# Patient Record
Sex: FEMALE | Race: WHITE | Employment: FULL TIME | URBAN - NONMETROPOLITAN AREA
[De-identification: names, ages, dates, MRNs, and addresses within clinical notes are randomized per-mention and may not be internally consistent; named-entity substitution may affect disease eponyms.]

---

## 2017-02-06 ENCOUNTER — NURSE ONLY (OUTPATIENT)
Dept: SURGERY | Age: 37
End: 2017-02-06

## 2017-02-06 ENCOUNTER — OFFICE VISIT (OUTPATIENT)
Dept: NEPHROLOGY | Age: 37
End: 2017-02-06

## 2017-02-06 VITALS
HEART RATE: 84 BPM | BODY MASS INDEX: 26.22 KG/M2 | DIASTOLIC BLOOD PRESSURE: 88 MMHG | WEIGHT: 148 LBS | HEIGHT: 63 IN | SYSTOLIC BLOOD PRESSURE: 140 MMHG

## 2017-02-06 DIAGNOSIS — Z84.1 FAMILY HISTORY OF KIDNEY STONES: ICD-10-CM

## 2017-02-06 DIAGNOSIS — J32.9 CHRONIC SINUSITIS, UNSPECIFIED LOCATION: ICD-10-CM

## 2017-02-06 DIAGNOSIS — K21.9 GASTROESOPHAGEAL REFLUX DISEASE, ESOPHAGITIS PRESENCE NOT SPECIFIED: Primary | ICD-10-CM

## 2017-02-06 DIAGNOSIS — N20.0 CALCIUM NEPHROLITHIASIS: Primary | ICD-10-CM

## 2017-02-06 DIAGNOSIS — R12 HEARTBURN: ICD-10-CM

## 2017-02-06 PROCEDURE — 99203 OFFICE O/P NEW LOW 30 MIN: CPT | Performed by: INTERNAL MEDICINE

## 2017-02-06 RX ORDER — NORETHINDRONE ACETATE AND ETHINYL ESTRADIOL 1MG-20(21)
1 KIT ORAL DAILY
COMMUNITY
End: 2017-04-21 | Stop reason: ALTCHOICE

## 2017-02-10 DIAGNOSIS — F41.9 ANXIETY: ICD-10-CM

## 2017-02-10 RX ORDER — LORAZEPAM 0.5 MG/1
0.5 TABLET ORAL EVERY 8 HOURS PRN
Qty: 20 TABLET | Refills: 0 | Status: SHIPPED | OUTPATIENT
Start: 2017-02-10 | End: 2017-10-20 | Stop reason: SDUPTHER

## 2017-02-17 ENCOUNTER — HOSPITAL ENCOUNTER (OUTPATIENT)
Age: 37
Setting detail: SPECIMEN
Discharge: HOME | End: 2017-02-17
Payer: COMMERCIAL

## 2017-02-17 LAB
CALCIUM IONIZED: 1.24 MMOL/L (ref 1.13–1.33)
PTH INTACT: 36.91 PG/ML (ref 15–65)
VITAMIN D 25-HYDROXY: 50.7 NG/ML (ref 30–100)

## 2017-02-18 ENCOUNTER — HOSPITAL ENCOUNTER (OUTPATIENT)
Age: 37
Setting detail: SPECIMEN
Discharge: HOME | End: 2017-02-18
Payer: COMMERCIAL

## 2017-02-20 LAB
CALCIUM TIMED UR: NORMAL MG/X H
CALCIUM URINE: 26.4 MG/DL
CALCIUM, URINE: 216 MG/24 H (ref 20–275)
HOURS COLLECTED: 24 H
PHOS,INORG TIMED UR: NORMAL MG/DL
PHOSPHORUS  URINE: 92.4 MG/DL
PHOSPHORUS, 24H UR: 758 MG/24 H (ref 400–1300)
URIC ACID 24 HOUR URINE: 443 MG/24 H (ref 250–750)
URIC ACID TIMED UR: NORMAL MG/X H
URIC ACID, UR: 54 MG/DL
VOLUME: 820 ML

## 2017-02-24 LAB
CITRIC ACID, U, 24HR: 588 MG/D (ref 320–1240)
CITRIC ACID, URINE: 717 MG/L
CITRIC ACID/CREATININE RATIO URINE: 569 MG/G
CREATININE URINE /24 HR: 1033 MG/D (ref 700–1600)
CREATININE URINE /24 HR: 1099 MG/D (ref 700–1600)
CREATININE URINE /VOLUME: 126 MG/DL
CREATININE URINE /VOLUME: 134 MG/DL
HOURS COLLECTED: 24
HOURS COLLECTED: 24
OXALATE 24 HOUR URINE: 19 MG/D (ref 4–31)
OXALATE URINE: 23 MG/L
URINE VOLUME: 820
URINE VOLUME: 820

## 2017-03-06 ENCOUNTER — OFFICE VISIT (OUTPATIENT)
Dept: NEPHROLOGY | Age: 37
End: 2017-03-06

## 2017-03-06 VITALS
HEART RATE: 76 BPM | RESPIRATION RATE: 16 BRPM | HEIGHT: 63 IN | SYSTOLIC BLOOD PRESSURE: 136 MMHG | BODY MASS INDEX: 26.22 KG/M2 | WEIGHT: 148 LBS | DIASTOLIC BLOOD PRESSURE: 92 MMHG

## 2017-03-06 DIAGNOSIS — Z84.1 FAMILY HISTORY OF KIDNEY STONES: ICD-10-CM

## 2017-03-06 DIAGNOSIS — N20.0 NEPHROLITHIASIS: Primary | ICD-10-CM

## 2017-03-06 DIAGNOSIS — N20.0 CALCIUM NEPHROLITHIASIS: ICD-10-CM

## 2017-03-06 PROCEDURE — 99213 OFFICE O/P EST LOW 20 MIN: CPT | Performed by: INTERNAL MEDICINE

## 2017-03-13 ENCOUNTER — HOSPITAL ENCOUNTER (OUTPATIENT)
Age: 37
Setting detail: OUTPATIENT SURGERY
Discharge: HOME | End: 2017-03-13
Attending: SURGERY | Admitting: SURGERY
Payer: COMMERCIAL

## 2017-03-13 ENCOUNTER — ANESTHESIA EVENT (OUTPATIENT)
Dept: OPERATING ROOM | Age: 37
End: 2017-03-13
Payer: COMMERCIAL

## 2017-03-13 ENCOUNTER — ANESTHESIA (OUTPATIENT)
Dept: OPERATING ROOM | Age: 37
End: 2017-03-13
Payer: COMMERCIAL

## 2017-03-13 ENCOUNTER — SURGERY (OUTPATIENT)
Age: 37
End: 2017-03-13

## 2017-03-13 VITALS
RESPIRATION RATE: 11 BRPM | DIASTOLIC BLOOD PRESSURE: 81 MMHG | SYSTOLIC BLOOD PRESSURE: 121 MMHG | OXYGEN SATURATION: 99 %

## 2017-03-13 VITALS
SYSTOLIC BLOOD PRESSURE: 135 MMHG | WEIGHT: 146 LBS | BODY MASS INDEX: 25.87 KG/M2 | HEART RATE: 68 BPM | RESPIRATION RATE: 16 BRPM | TEMPERATURE: 98.4 F | DIASTOLIC BLOOD PRESSURE: 91 MMHG | HEIGHT: 63 IN | OXYGEN SATURATION: 100 %

## 2017-03-13 LAB — HCG(URINE) PREGNANCY TEST: NEGATIVE

## 2017-03-13 PROCEDURE — 43235 EGD DIAGNOSTIC BRUSH WASH: CPT | Performed by: SURGERY

## 2017-03-13 PROCEDURE — 88342 IMHCHEM/IMCYTCHM 1ST ANTB: CPT

## 2017-03-13 PROCEDURE — 3609012400 HC EGD TRANSORAL BIOPSY SINGLE/MULTIPLE: Performed by: SURGERY

## 2017-03-13 PROCEDURE — 6360000002 HC RX W HCPCS: Performed by: NURSE ANESTHETIST, CERTIFIED REGISTERED

## 2017-03-13 PROCEDURE — 7100000010 HC PHASE II RECOVERY - FIRST 15 MIN: Performed by: SURGERY

## 2017-03-13 PROCEDURE — 99152 MOD SED SAME PHYS/QHP 5/>YRS: CPT

## 2017-03-13 PROCEDURE — 7100000011 HC PHASE II RECOVERY - ADDTL 15 MIN: Performed by: SURGERY

## 2017-03-13 PROCEDURE — 84703 CHORIONIC GONADOTROPIN ASSAY: CPT

## 2017-03-13 PROCEDURE — 88305 TISSUE EXAM BY PATHOLOGIST: CPT

## 2017-03-13 PROCEDURE — 2580000003 HC RX 258: Performed by: SURGERY

## 2017-03-13 RX ORDER — SODIUM CHLORIDE, SODIUM LACTATE, POTASSIUM CHLORIDE, CALCIUM CHLORIDE 600; 310; 30; 20 MG/100ML; MG/100ML; MG/100ML; MG/100ML
INJECTION, SOLUTION INTRAVENOUS CONTINUOUS
Status: DISCONTINUED | OUTPATIENT
Start: 2017-03-13 | End: 2017-03-13 | Stop reason: HOSPADM

## 2017-03-13 RX ORDER — MIDAZOLAM HYDROCHLORIDE 1 MG/ML
INJECTION INTRAMUSCULAR; INTRAVENOUS PRN
Status: DISCONTINUED | OUTPATIENT
Start: 2017-03-13 | End: 2017-03-13 | Stop reason: SDUPTHER

## 2017-03-13 RX ORDER — FENTANYL CITRATE 50 UG/ML
INJECTION, SOLUTION INTRAMUSCULAR; INTRAVENOUS PRN
Status: DISCONTINUED | OUTPATIENT
Start: 2017-03-13 | End: 2017-03-13 | Stop reason: SDUPTHER

## 2017-03-13 RX ORDER — PROPOFOL 10 MG/ML
INJECTION, EMULSION INTRAVENOUS PRN
Status: DISCONTINUED | OUTPATIENT
Start: 2017-03-13 | End: 2017-03-13 | Stop reason: SDUPTHER

## 2017-03-13 RX ADMIN — PROPOFOL 60 MG: 10 INJECTION, EMULSION INTRAVENOUS at 09:48

## 2017-03-13 RX ADMIN — SODIUM CHLORIDE, POTASSIUM CHLORIDE, SODIUM LACTATE AND CALCIUM CHLORIDE: 600; 310; 30; 20 INJECTION, SOLUTION INTRAVENOUS at 09:18

## 2017-03-13 RX ADMIN — PROPOFOL 40 MG: 10 INJECTION, EMULSION INTRAVENOUS at 09:47

## 2017-03-13 RX ADMIN — PROPOFOL 30 MG: 10 INJECTION, EMULSION INTRAVENOUS at 09:51

## 2017-03-13 RX ADMIN — FENTANYL CITRATE 100 MCG: 50 INJECTION INTRAMUSCULAR; INTRAVENOUS at 09:47

## 2017-03-13 RX ADMIN — MIDAZOLAM 2 MG: 1 INJECTION INTRAMUSCULAR; INTRAVENOUS at 09:47

## 2017-03-13 ASSESSMENT — PAIN SCALES - GENERAL
PAINLEVEL_OUTOF10: 0

## 2017-03-13 ASSESSMENT — PAIN - FUNCTIONAL ASSESSMENT: PAIN_FUNCTIONAL_ASSESSMENT: 0-10

## 2017-03-15 LAB — SURGICAL PATHOLOGY REPORT: NORMAL

## 2017-03-27 ENCOUNTER — TELEPHONE (OUTPATIENT)
Dept: SURGERY | Age: 37
End: 2017-03-27

## 2017-04-07 ENCOUNTER — TELEPHONE (OUTPATIENT)
Dept: PRIMARY CARE CLINIC | Age: 37
End: 2017-04-07

## 2017-04-07 ENCOUNTER — TELEPHONE (OUTPATIENT)
Dept: SURGERY | Age: 37
End: 2017-04-07

## 2017-04-07 ENCOUNTER — OFFICE VISIT (OUTPATIENT)
Dept: PRIMARY CARE CLINIC | Age: 37
End: 2017-04-07
Payer: COMMERCIAL

## 2017-04-07 VITALS
RESPIRATION RATE: 16 BRPM | SYSTOLIC BLOOD PRESSURE: 142 MMHG | WEIGHT: 147 LBS | OXYGEN SATURATION: 98 % | DIASTOLIC BLOOD PRESSURE: 92 MMHG | TEMPERATURE: 100 F | HEART RATE: 96 BPM | BODY MASS INDEX: 26.05 KG/M2 | HEIGHT: 63 IN

## 2017-04-07 DIAGNOSIS — J06.9 ACUTE URI: ICD-10-CM

## 2017-04-07 DIAGNOSIS — J01.00 ACUTE NON-RECURRENT MAXILLARY SINUSITIS: Primary | ICD-10-CM

## 2017-04-07 PROCEDURE — 99213 OFFICE O/P EST LOW 20 MIN: CPT | Performed by: FAMILY MEDICINE

## 2017-04-07 RX ORDER — AZITHROMYCIN 250 MG/1
TABLET, FILM COATED ORAL
Qty: 11 TABLET | Refills: 0 | Status: SHIPPED | OUTPATIENT
Start: 2017-04-07 | End: 2017-04-21 | Stop reason: ALTCHOICE

## 2017-04-07 RX ORDER — DOXYCYCLINE HYCLATE 100 MG/1
100 CAPSULE ORAL 2 TIMES DAILY
Qty: 20 CAPSULE | Refills: 0 | Status: SHIPPED | OUTPATIENT
Start: 2017-04-07 | End: 2017-04-07

## 2017-04-07 ASSESSMENT — ENCOUNTER SYMPTOMS
GASTROINTESTINAL NEGATIVE: 1
EYES NEGATIVE: 1
COUGH: 1
SINUS PRESSURE: 1
RHINORRHEA: 1
ALLERGIC/IMMUNOLOGIC NEGATIVE: 1
SORE THROAT: 1

## 2017-04-12 ENCOUNTER — TELEPHONE (OUTPATIENT)
Dept: FAMILY MEDICINE CLINIC | Age: 37
End: 2017-04-12

## 2017-04-12 DIAGNOSIS — K21.9 LPRD (LARYNGOPHARYNGEAL REFLUX DISEASE): ICD-10-CM

## 2017-04-12 DIAGNOSIS — J30.1 SEASONAL ALLERGIC RHINITIS DUE TO POLLEN: ICD-10-CM

## 2017-04-12 DIAGNOSIS — K21.9 GASTROESOPHAGEAL REFLUX DISEASE, ESOPHAGITIS PRESENCE NOT SPECIFIED: ICD-10-CM

## 2017-04-12 RX ORDER — RANITIDINE 300 MG/1
TABLET ORAL
Qty: 90 TABLET | Refills: 3 | Status: SHIPPED | OUTPATIENT
Start: 2017-04-12 | End: 2018-03-27 | Stop reason: SDUPTHER

## 2017-04-12 RX ORDER — FLUTICASONE PROPIONATE 50 MCG
SPRAY, SUSPENSION (ML) NASAL
Qty: 1 BOTTLE | Refills: 11 | Status: SHIPPED | OUTPATIENT
Start: 2017-04-12 | End: 2018-03-27 | Stop reason: SDUPTHER

## 2017-04-21 ENCOUNTER — OFFICE VISIT (OUTPATIENT)
Dept: FAMILY MEDICINE CLINIC | Age: 37
End: 2017-04-21
Payer: COMMERCIAL

## 2017-04-21 VITALS
HEIGHT: 63 IN | OXYGEN SATURATION: 96 % | HEART RATE: 87 BPM | SYSTOLIC BLOOD PRESSURE: 118 MMHG | DIASTOLIC BLOOD PRESSURE: 64 MMHG | WEIGHT: 145 LBS | BODY MASS INDEX: 25.69 KG/M2

## 2017-04-21 DIAGNOSIS — R79.89 LOW T4: Primary | ICD-10-CM

## 2017-04-21 DIAGNOSIS — R53.82 CHRONIC FATIGUE: ICD-10-CM

## 2017-04-21 DIAGNOSIS — E16.2 HYPOGLYCEMIA: ICD-10-CM

## 2017-04-21 DIAGNOSIS — J01.41 ACUTE RECURRENT PANSINUSITIS: ICD-10-CM

## 2017-04-21 DIAGNOSIS — L65.9 HAIR LOSS: ICD-10-CM

## 2017-04-21 PROCEDURE — 99214 OFFICE O/P EST MOD 30 MIN: CPT | Performed by: FAMILY MEDICINE

## 2017-04-21 RX ORDER — ZINC OXIDE 13 %
1 CREAM (GRAM) TOPICAL DAILY
Qty: 30 CAPSULE | Refills: 3 | Status: SHIPPED | OUTPATIENT
Start: 2017-04-21 | End: 2017-05-26

## 2017-04-21 RX ORDER — CEFUROXIME AXETIL 250 MG/1
250 TABLET ORAL 2 TIMES DAILY
Qty: 20 TABLET | Refills: 0 | Status: SHIPPED | OUTPATIENT
Start: 2017-04-21 | End: 2017-05-01

## 2017-04-24 ENCOUNTER — TELEPHONE (OUTPATIENT)
Dept: FAMILY MEDICINE CLINIC | Age: 37
End: 2017-04-24

## 2017-04-24 DIAGNOSIS — J01.41 ACUTE RECURRENT PANSINUSITIS: Primary | ICD-10-CM

## 2017-04-24 RX ORDER — LEVOFLOXACIN 500 MG/1
500 TABLET, FILM COATED ORAL DAILY
Qty: 7 TABLET | Refills: 0 | Status: SHIPPED | OUTPATIENT
Start: 2017-04-24 | End: 2017-05-01

## 2017-05-04 ASSESSMENT — ENCOUNTER SYMPTOMS
COUGH: 1
VOICE CHANGE: 1
SINUS PRESSURE: 1
CONSTIPATION: 1

## 2017-05-05 ENCOUNTER — HOSPITAL ENCOUNTER (OUTPATIENT)
Age: 37
Setting detail: SPECIMEN
Discharge: HOME | End: 2017-05-05
Payer: COMMERCIAL

## 2017-05-05 ENCOUNTER — TELEPHONE (OUTPATIENT)
Dept: FAMILY MEDICINE CLINIC | Age: 37
End: 2017-05-05

## 2017-05-05 LAB
INSULIN COMMENT: NORMAL
INSULIN REFERENCE RANGE:: NORMAL
INSULIN: 25 MU/L
T3 TOTAL: 170 NG/DL (ref 80–200)
VITAMIN D 25-HYDROXY: 53.9 NG/ML (ref 30–100)

## 2017-05-06 ENCOUNTER — OFFICE VISIT (OUTPATIENT)
Dept: PRIMARY CARE CLINIC | Age: 37
End: 2017-05-06
Payer: COMMERCIAL

## 2017-05-06 VITALS
HEIGHT: 63 IN | WEIGHT: 145.8 LBS | TEMPERATURE: 99.3 F | BODY MASS INDEX: 25.83 KG/M2 | SYSTOLIC BLOOD PRESSURE: 116 MMHG | RESPIRATION RATE: 16 BRPM | HEART RATE: 93 BPM | OXYGEN SATURATION: 98 % | DIASTOLIC BLOOD PRESSURE: 86 MMHG

## 2017-05-06 DIAGNOSIS — H69.83 EUSTACHIAN TUBE DYSFUNCTION, BILATERAL: ICD-10-CM

## 2017-05-06 DIAGNOSIS — J32.9 CHRONIC RECURRENT SINUSITIS: Primary | ICD-10-CM

## 2017-05-06 PROCEDURE — 99213 OFFICE O/P EST LOW 20 MIN: CPT | Performed by: FAMILY MEDICINE

## 2017-05-06 RX ORDER — PREDNISONE 20 MG/1
TABLET ORAL
Qty: 13 TABLET | Refills: 0 | Status: SHIPPED | OUTPATIENT
Start: 2017-05-06 | End: 2017-05-18 | Stop reason: ALTCHOICE

## 2017-05-06 RX ORDER — FEXOFENADINE HCL 180 MG/1
180 TABLET ORAL DAILY
Qty: 30 TABLET | Refills: 11 | Status: SHIPPED | OUTPATIENT
Start: 2017-05-06 | End: 2018-02-09 | Stop reason: ALTCHOICE

## 2017-05-06 ASSESSMENT — ENCOUNTER SYMPTOMS
NAUSEA: 0
VOMITING: 0
RHINORRHEA: 0
SHORTNESS OF BREATH: 0
SINUS PRESSURE: 1
WHEEZING: 0
COUGH: 1
VOICE CHANGE: 1
SINUS COMPLAINT: 1

## 2017-05-10 ENCOUNTER — OFFICE VISIT (OUTPATIENT)
Dept: OTOLARYNGOLOGY | Age: 37
End: 2017-05-10
Payer: COMMERCIAL

## 2017-05-10 VITALS
WEIGHT: 143 LBS | SYSTOLIC BLOOD PRESSURE: 124 MMHG | HEIGHT: 63 IN | TEMPERATURE: 98.2 F | DIASTOLIC BLOOD PRESSURE: 72 MMHG | HEART RATE: 72 BPM | BODY MASS INDEX: 25.34 KG/M2

## 2017-05-10 DIAGNOSIS — J32.9 SINUSITIS, UNSPECIFIED CHRONICITY, UNSPECIFIED LOCATION: Primary | ICD-10-CM

## 2017-05-10 DIAGNOSIS — H69.82 ETD (EUSTACHIAN TUBE DYSFUNCTION), LEFT: ICD-10-CM

## 2017-05-10 PROCEDURE — 31231 NASAL ENDOSCOPY DX: CPT | Performed by: OTOLARYNGOLOGY

## 2017-05-17 ENCOUNTER — TELEPHONE (OUTPATIENT)
Dept: FAMILY MEDICINE CLINIC | Age: 37
End: 2017-05-17

## 2017-05-17 DIAGNOSIS — R73.01 ELEVATED FASTING BLOOD SUGAR: Primary | ICD-10-CM

## 2017-05-18 ENCOUNTER — OFFICE VISIT (OUTPATIENT)
Dept: PRIMARY CARE CLINIC | Age: 37
End: 2017-05-18
Payer: COMMERCIAL

## 2017-05-18 VITALS
TEMPERATURE: 99.9 F | OXYGEN SATURATION: 98 % | HEIGHT: 63 IN | DIASTOLIC BLOOD PRESSURE: 74 MMHG | BODY MASS INDEX: 25.98 KG/M2 | WEIGHT: 146.6 LBS | HEART RATE: 74 BPM | SYSTOLIC BLOOD PRESSURE: 126 MMHG

## 2017-05-18 DIAGNOSIS — J01.41 ACUTE RECURRENT PANSINUSITIS: ICD-10-CM

## 2017-05-18 DIAGNOSIS — J06.9 ACUTE URI: Primary | ICD-10-CM

## 2017-05-18 PROCEDURE — 99213 OFFICE O/P EST LOW 20 MIN: CPT | Performed by: FAMILY MEDICINE

## 2017-05-18 RX ORDER — LEVOFLOXACIN 500 MG/1
500 TABLET, FILM COATED ORAL DAILY
Qty: 10 TABLET | Refills: 0 | Status: SHIPPED | OUTPATIENT
Start: 2017-05-18 | End: 2017-05-28

## 2017-05-18 RX ORDER — DOXYCYCLINE HYCLATE 100 MG/1
100 CAPSULE ORAL 2 TIMES DAILY
Qty: 20 CAPSULE | Refills: 0 | Status: SHIPPED | OUTPATIENT
Start: 2017-05-18 | End: 2017-05-18

## 2017-05-18 ASSESSMENT — ENCOUNTER SYMPTOMS
RHINORRHEA: 1
COUGH: 1
EYES NEGATIVE: 1
SORE THROAT: 1
GASTROINTESTINAL NEGATIVE: 1
SHORTNESS OF BREATH: 1
SINUS PRESSURE: 1
ALLERGIC/IMMUNOLOGIC NEGATIVE: 1

## 2017-05-19 ENCOUNTER — TELEPHONE (OUTPATIENT)
Dept: ALLERGY | Age: 37
End: 2017-05-19

## 2017-05-19 ENCOUNTER — TELEPHONE (OUTPATIENT)
Dept: FAMILY MEDICINE CLINIC | Age: 37
End: 2017-05-19

## 2017-05-19 RX ORDER — BENZONATATE 100 MG/1
100 CAPSULE ORAL 3 TIMES DAILY PRN
Qty: 30 CAPSULE | Refills: 0 | Status: SHIPPED | OUTPATIENT
Start: 2017-05-19 | End: 2017-06-30 | Stop reason: ALTCHOICE

## 2017-05-26 ENCOUNTER — TELEPHONE (OUTPATIENT)
Dept: OTOLARYNGOLOGY | Age: 37
End: 2017-05-26

## 2017-05-26 ENCOUNTER — OFFICE VISIT (OUTPATIENT)
Dept: INTERNAL MEDICINE | Age: 37
End: 2017-05-26

## 2017-05-26 VITALS — BODY MASS INDEX: 25.87 KG/M2 | HEIGHT: 63 IN | WEIGHT: 146 LBS

## 2017-05-26 DIAGNOSIS — R79.89 ABNORMAL THYROID BLOOD TEST: Primary | ICD-10-CM

## 2017-05-26 DIAGNOSIS — K21.9 GASTROESOPHAGEAL REFLUX DISEASE, ESOPHAGITIS PRESENCE NOT SPECIFIED: ICD-10-CM

## 2017-05-26 DIAGNOSIS — E78.5 HYPERLIPIDEMIA, UNSPECIFIED HYPERLIPIDEMIA TYPE: ICD-10-CM

## 2017-05-26 DIAGNOSIS — Z72.0 TOBACCO ABUSE: ICD-10-CM

## 2017-05-26 PROCEDURE — 99204 OFFICE O/P NEW MOD 45 MIN: CPT | Performed by: INTERNAL MEDICINE

## 2017-05-26 RX ORDER — VITAMIN A 3000 MCG
CAPSULE ORAL DAILY
Refills: 0 | COMMUNITY
Start: 2017-05-19 | End: 2017-05-26

## 2017-05-26 ASSESSMENT — PATIENT HEALTH QUESTIONNAIRE - PHQ9
1. LITTLE INTEREST OR PLEASURE IN DOING THINGS: 0
SUM OF ALL RESPONSES TO PHQ QUESTIONS 1-9: 0
2. FEELING DOWN, DEPRESSED OR HOPELESS: 0
SUM OF ALL RESPONSES TO PHQ9 QUESTIONS 1 & 2: 0

## 2017-06-01 ENCOUNTER — TELEPHONE (OUTPATIENT)
Dept: ALLERGY | Age: 37
End: 2017-06-01

## 2017-06-02 ENCOUNTER — TELEPHONE (OUTPATIENT)
Dept: FAMILY MEDICINE CLINIC | Age: 37
End: 2017-06-02

## 2017-06-02 DIAGNOSIS — J32.9 CHRONIC SINUSITIS, UNSPECIFIED LOCATION: Primary | ICD-10-CM

## 2017-06-02 RX ORDER — PREDNISONE 20 MG/1
TABLET ORAL
Qty: 13 TABLET | Refills: 0 | Status: SHIPPED | OUTPATIENT
Start: 2017-06-02 | End: 2017-06-30 | Stop reason: ALTCHOICE

## 2017-06-08 ENCOUNTER — OFFICE VISIT (OUTPATIENT)
Dept: OTOLARYNGOLOGY | Age: 37
End: 2017-06-08
Payer: COMMERCIAL

## 2017-06-08 VITALS
BODY MASS INDEX: 25.69 KG/M2 | TEMPERATURE: 98.9 F | HEIGHT: 63 IN | DIASTOLIC BLOOD PRESSURE: 86 MMHG | SYSTOLIC BLOOD PRESSURE: 130 MMHG | HEART RATE: 72 BPM | WEIGHT: 145 LBS

## 2017-06-08 DIAGNOSIS — J30.9 ALLERGIC RHINITIS, UNSPECIFIED ALLERGIC RHINITIS TRIGGER, UNSPECIFIED RHINITIS SEASONALITY: ICD-10-CM

## 2017-06-08 DIAGNOSIS — J32.9 SINUSITIS, UNSPECIFIED CHRONICITY, UNSPECIFIED LOCATION: Primary | ICD-10-CM

## 2017-06-08 DIAGNOSIS — H69.82 ETD (EUSTACHIAN TUBE DYSFUNCTION), LEFT: ICD-10-CM

## 2017-06-08 PROCEDURE — 31231 NASAL ENDOSCOPY DX: CPT | Performed by: OTOLARYNGOLOGY

## 2017-06-08 RX ORDER — AZELASTINE 1 MG/ML
2 SPRAY, METERED NASAL 2 TIMES DAILY
Qty: 1 BOTTLE | Refills: 5 | Status: SHIPPED | OUTPATIENT
Start: 2017-06-08 | End: 2018-02-09

## 2017-06-16 ENCOUNTER — HOSPITAL ENCOUNTER (OUTPATIENT)
Age: 37
Setting detail: SPECIMEN
Discharge: HOME | End: 2017-06-16
Payer: COMMERCIAL

## 2017-06-16 LAB — T3 TOTAL: 164 NG/DL (ref 80–200)

## 2017-06-17 ENCOUNTER — PATIENT MESSAGE (OUTPATIENT)
Dept: PRIMARY CARE CLINIC | Age: 37
End: 2017-06-17

## 2017-06-19 LAB
THYROGLOBULIN AB: <20 IU/ML (ref 0–40)
THYROGLOBULIN: 20.2 NG/ML (ref 0–63.4)
THYROID PEROXIDASE (TPO) AB: <10 IU/ML (ref 0–35)

## 2017-06-30 ENCOUNTER — OFFICE VISIT (OUTPATIENT)
Dept: FAMILY MEDICINE CLINIC | Age: 37
End: 2017-06-30
Payer: COMMERCIAL

## 2017-06-30 VITALS
SYSTOLIC BLOOD PRESSURE: 130 MMHG | HEIGHT: 63 IN | WEIGHT: 145 LBS | HEART RATE: 76 BPM | DIASTOLIC BLOOD PRESSURE: 88 MMHG | BODY MASS INDEX: 25.69 KG/M2

## 2017-06-30 DIAGNOSIS — K21.9 GASTROESOPHAGEAL REFLUX DISEASE, ESOPHAGITIS PRESENCE NOT SPECIFIED: ICD-10-CM

## 2017-06-30 DIAGNOSIS — J30.1 SEASONAL ALLERGIC RHINITIS DUE TO POLLEN: ICD-10-CM

## 2017-06-30 DIAGNOSIS — K21.9 LPRD (LARYNGOPHARYNGEAL REFLUX DISEASE): Primary | ICD-10-CM

## 2017-06-30 DIAGNOSIS — R09.89 SINUS COMPLAINT: ICD-10-CM

## 2017-06-30 PROCEDURE — 99213 OFFICE O/P EST LOW 20 MIN: CPT | Performed by: NURSE PRACTITIONER

## 2017-06-30 RX ORDER — VITAMIN A 3000 MCG
CAPSULE ORAL
Refills: 0 | COMMUNITY
Start: 2017-06-14 | End: 2017-08-23 | Stop reason: SDUPTHER

## 2017-07-28 ENCOUNTER — OFFICE VISIT (OUTPATIENT)
Dept: OTOLARYNGOLOGY | Age: 37
End: 2017-07-28
Payer: COMMERCIAL

## 2017-07-28 VITALS
SYSTOLIC BLOOD PRESSURE: 126 MMHG | HEART RATE: 74 BPM | HEIGHT: 63 IN | DIASTOLIC BLOOD PRESSURE: 84 MMHG | TEMPERATURE: 99.1 F | RESPIRATION RATE: 12 BRPM | BODY MASS INDEX: 25.69 KG/M2 | WEIGHT: 145 LBS

## 2017-07-28 DIAGNOSIS — J30.9 ALLERGIC RHINITIS, UNSPECIFIED ALLERGIC RHINITIS TRIGGER, UNSPECIFIED RHINITIS SEASONALITY: ICD-10-CM

## 2017-07-28 DIAGNOSIS — J32.9 SINUSITIS, UNSPECIFIED CHRONICITY, UNSPECIFIED LOCATION: Primary | ICD-10-CM

## 2017-07-28 PROCEDURE — 31231 NASAL ENDOSCOPY DX: CPT | Performed by: OTOLARYNGOLOGY

## 2017-08-19 DIAGNOSIS — R14.0 ABDOMINAL BLOATING: Primary | ICD-10-CM

## 2017-08-19 DIAGNOSIS — K21.9 GASTROESOPHAGEAL REFLUX DISEASE, ESOPHAGITIS PRESENCE NOT SPECIFIED: ICD-10-CM

## 2017-08-19 DIAGNOSIS — R10.9 LEFT SIDED ABDOMINAL PAIN: ICD-10-CM

## 2017-08-23 RX ORDER — VITAMIN A 3000 MCG
CAPSULE ORAL
Qty: 30 CAPSULE | Refills: 11 | Status: SHIPPED | OUTPATIENT
Start: 2017-08-23 | End: 2018-09-29

## 2017-09-08 ENCOUNTER — OFFICE VISIT (OUTPATIENT)
Dept: INTERNAL MEDICINE CLINIC | Age: 37
End: 2017-09-08
Payer: COMMERCIAL

## 2017-09-08 VITALS
WEIGHT: 147 LBS | SYSTOLIC BLOOD PRESSURE: 150 MMHG | BODY MASS INDEX: 26.05 KG/M2 | DIASTOLIC BLOOD PRESSURE: 100 MMHG | HEIGHT: 63 IN | HEART RATE: 68 BPM

## 2017-09-08 DIAGNOSIS — R59.9 LYMPH NODES ENLARGED: ICD-10-CM

## 2017-09-08 DIAGNOSIS — R03.0 ELEVATED BLOOD PRESSURE READING: ICD-10-CM

## 2017-09-08 DIAGNOSIS — R79.89 ABNORMAL TSH: Primary | ICD-10-CM

## 2017-09-08 DIAGNOSIS — Z12.31 SCREENING MAMMOGRAM, ENCOUNTER FOR: ICD-10-CM

## 2017-09-08 DIAGNOSIS — Z72.0 TOBACCO ABUSE: ICD-10-CM

## 2017-09-08 DIAGNOSIS — E78.5 HYPERLIPIDEMIA, UNSPECIFIED HYPERLIPIDEMIA TYPE: ICD-10-CM

## 2017-09-08 PROCEDURE — 99213 OFFICE O/P EST LOW 20 MIN: CPT | Performed by: INTERNAL MEDICINE

## 2017-09-15 ENCOUNTER — HOSPITAL ENCOUNTER (OUTPATIENT)
Dept: LAB | Age: 37
Discharge: HOME | End: 2017-09-15
Payer: COMMERCIAL

## 2017-09-15 DIAGNOSIS — N20.0 NEPHROLITHIASIS: ICD-10-CM

## 2017-09-15 DIAGNOSIS — R79.89 ABNORMAL TSH: ICD-10-CM

## 2017-09-15 LAB
-: ABNORMAL
ABSOLUTE EOS #: 0.2 K/UL (ref 0–0.4)
ABSOLUTE LYMPH #: 2.8 K/UL (ref 1–4.8)
ABSOLUTE MONO #: 0.5 K/UL (ref 0.1–1.2)
AMORPHOUS: ABNORMAL
ANION GAP SERPL CALCULATED.3IONS-SCNC: 15 MMOL/L (ref 9–17)
BACTERIA: ABNORMAL
BASOPHILS # BLD: 1 % (ref 0–1)
BASOPHILS ABSOLUTE: 0.1 K/UL (ref 0–0.2)
BILIRUBIN URINE: NEGATIVE
BUN BLDV-MCNC: 13 MG/DL (ref 6–20)
BUN/CREAT BLD: 25 (ref 9–20)
CALCIUM SERPL-MCNC: 9.5 MG/DL (ref 8.6–10.4)
CASTS UA: ABNORMAL /LPF (ref 0–2)
CHLORIDE BLD-SCNC: 101 MMOL/L (ref 98–107)
CO2: 25 MMOL/L (ref 20–31)
COLOR: ABNORMAL
COMMENT UA: ABNORMAL
CREAT SERPL-MCNC: 0.51 MG/DL (ref 0.5–0.9)
CREATININE URINE: 165.4 MG/DL (ref 28–217)
CRYSTALS, UA: ABNORMAL /HPF
DIFFERENTIAL TYPE: NORMAL
EOSINOPHILS RELATIVE PERCENT: 2 % (ref 1–7)
EPITHELIAL CELLS UA: ABNORMAL /HPF (ref 0–5)
GFR AFRICAN AMERICAN: >60 ML/MIN
GFR NON-AFRICAN AMERICAN: >60 ML/MIN
GFR SERPL CREATININE-BSD FRML MDRD: ABNORMAL ML/MIN/{1.73_M2}
GFR SERPL CREATININE-BSD FRML MDRD: ABNORMAL ML/MIN/{1.73_M2}
GLUCOSE BLD-MCNC: 104 MG/DL (ref 70–99)
GLUCOSE URINE: NEGATIVE
HCT VFR BLD CALC: 40.6 % (ref 36–46)
HEMOGLOBIN: 13.7 G/DL (ref 12–16)
KETONES, URINE: NEGATIVE
LEUKOCYTE ESTERASE, URINE: ABNORMAL
LYMPHOCYTES # BLD: 26 % (ref 16–46)
MCH RBC QN AUTO: 31.7 PG (ref 26–34)
MCHC RBC AUTO-ENTMCNC: 33.6 G/DL (ref 31–37)
MCV RBC AUTO: 94.4 FL (ref 80–100)
MONOCYTES # BLD: 5 % (ref 4–11)
MUCUS: ABNORMAL
NITRITE, URINE: NEGATIVE
OTHER OBSERVATIONS UA: ABNORMAL
PDW BLD-RTO: 12.2 % (ref 11–14.5)
PH UA: 6.5 (ref 5–6)
PLATELET # BLD: 213 K/UL (ref 140–450)
PLATELET ESTIMATE: NORMAL
PMV BLD AUTO: 9.8 FL (ref 6–12)
POTASSIUM SERPL-SCNC: 4.2 MMOL/L (ref 3.7–5.3)
PROTEIN UA: NEGATIVE
RBC # BLD: 4.31 M/UL (ref 4–5.2)
RBC # BLD: NORMAL 10*6/UL
RBC UA: ABNORMAL /HPF (ref 0–4)
RENAL EPITHELIAL, UA: ABNORMAL /HPF
SEG NEUTROPHILS: 66 % (ref 43–77)
SEGMENTED NEUTROPHILS ABSOLUTE COUNT: 7 K/UL (ref 1.8–7.7)
SODIUM BLD-SCNC: 141 MMOL/L (ref 135–144)
SPECIFIC GRAVITY UA: 1.02 (ref 1.01–1.02)
THYROXINE, FREE: 0.89 NG/DL (ref 0.93–1.7)
TOTAL PROTEIN, URINE: 18 MG/DL
TRICHOMONAS: ABNORMAL
TSH SERPL DL<=0.05 MIU/L-ACNC: 0.96 MIU/L (ref 0.3–5)
TURBIDITY: ABNORMAL
URINE HGB: NEGATIVE
UROBILINOGEN, URINE: NORMAL
WBC # BLD: 10.6 K/UL (ref 3.5–11)
WBC # BLD: NORMAL 10*3/UL
WBC UA: ABNORMAL /HPF (ref 0–4)
YEAST: ABNORMAL

## 2017-09-15 PROCEDURE — 36415 COLL VENOUS BLD VENIPUNCTURE: CPT

## 2017-09-15 PROCEDURE — 84439 ASSAY OF FREE THYROXINE: CPT

## 2017-09-15 PROCEDURE — 82570 ASSAY OF URINE CREATININE: CPT

## 2017-09-15 PROCEDURE — 84443 ASSAY THYROID STIM HORMONE: CPT

## 2017-09-15 PROCEDURE — 84156 ASSAY OF PROTEIN URINE: CPT

## 2017-09-15 PROCEDURE — 81001 URINALYSIS AUTO W/SCOPE: CPT

## 2017-09-15 PROCEDURE — 85025 COMPLETE CBC W/AUTO DIFF WBC: CPT

## 2017-09-15 PROCEDURE — 80048 BASIC METABOLIC PNL TOTAL CA: CPT

## 2017-09-16 ENCOUNTER — HOSPITAL ENCOUNTER (OUTPATIENT)
Age: 37
Setting detail: SPECIMEN
Discharge: HOME | End: 2017-09-16
Payer: COMMERCIAL

## 2017-09-16 PROCEDURE — 83945 ASSAY OF OXALATE: CPT

## 2017-09-16 PROCEDURE — 82507 ASSAY OF CITRATE: CPT

## 2017-09-16 PROCEDURE — 84300 ASSAY OF URINE SODIUM: CPT

## 2017-09-16 PROCEDURE — 84560 ASSAY OF URINE/URIC ACID: CPT

## 2017-09-16 PROCEDURE — 82340 ASSAY OF CALCIUM IN URINE: CPT

## 2017-09-16 PROCEDURE — 84105 ASSAY OF URINE PHOSPHORUS: CPT

## 2017-09-18 DIAGNOSIS — N20.0 NEPHROLITHIASIS: ICD-10-CM

## 2017-09-18 LAB
CALCIUM TIMED UR: ABNORMAL MG/X H
CALCIUM URINE: 30.2 MG/DL
CALCIUM, URINE: 362 MG/24 H (ref 20–275)
HOURS COLLECTED: 24 H
PHOS,INORG TIMED UR: NORMAL MG/DL
PHOSPHORUS  URINE: 70.5 MG/DL
PHOSPHORUS, 24H UR: 846 MG/24 H (ref 400–1300)
SODIUM 24 HOUR URINE: 205 MMOL/24 H (ref 40–220)
SODIUM TIMED UR: NORMAL MMOL/X H
SODIUM URINE: 171 MMOL/L
URIC ACID 24 HOUR URINE: 736 MG/24 H (ref 250–750)
URIC ACID TIMED UR: NORMAL MG/X H
URIC ACID, UR: 61.3 MG/DL
VOLUME: 1200 ML

## 2017-09-20 LAB
CREATININE URINE /24 HR: 1308 MG/D (ref 700–1600)
CREATININE URINE /VOLUME: 109 MG/DL
HOURS COLLECTED: 24
OXALATE 24 HOUR URINE: 23 MG/D (ref 13–40)
OXALATE URINE: 19 MG/L
URINE VOLUME: 1200

## 2017-09-26 LAB
CITRIC ACID, U, 24HR: 824 MG/D (ref 320–1240)
CITRIC ACID, URINE: 687 MG/L
CITRIC ACID/CREATININE RATIO URINE: 667 MG/G
CREATININE URINE /24 HR: 1236 MG/D (ref 700–1600)
CREATININE URINE /VOLUME: 103 MG/DL
HOURS COLLECTED: 24
URINE VOLUME: 1200

## 2017-09-29 ENCOUNTER — HOSPITAL ENCOUNTER (OUTPATIENT)
Dept: PHYSICAL THERAPY | Age: 37
Setting detail: THERAPIES SERIES
Discharge: HOME | End: 2017-09-29
Payer: COMMERCIAL

## 2017-09-29 ENCOUNTER — OFFICE VISIT (OUTPATIENT)
Dept: PRIMARY CARE CLINIC | Age: 37
End: 2017-09-29
Payer: COMMERCIAL

## 2017-09-29 VITALS
BODY MASS INDEX: 26.4 KG/M2 | HEIGHT: 63 IN | WEIGHT: 149 LBS | OXYGEN SATURATION: 98 % | DIASTOLIC BLOOD PRESSURE: 100 MMHG | TEMPERATURE: 98 F | SYSTOLIC BLOOD PRESSURE: 140 MMHG | HEART RATE: 72 BPM

## 2017-09-29 DIAGNOSIS — R42 VERTIGO: Primary | ICD-10-CM

## 2017-09-29 DIAGNOSIS — J30.9 ALLERGIC RHINITIS, UNSPECIFIED ALLERGIC RHINITIS TRIGGER, UNSPECIFIED RHINITIS SEASONALITY: ICD-10-CM

## 2017-09-29 PROCEDURE — 97112 NEUROMUSCULAR REEDUCATION: CPT | Performed by: PHYSICAL THERAPIST

## 2017-09-29 PROCEDURE — 99213 OFFICE O/P EST LOW 20 MIN: CPT | Performed by: PHYSICIAN ASSISTANT

## 2017-09-29 PROCEDURE — G8981 BODY POS CURRENT STATUS: HCPCS | Performed by: PHYSICAL THERAPIST

## 2017-09-29 PROCEDURE — 97161 PT EVAL LOW COMPLEX 20 MIN: CPT | Performed by: PHYSICAL THERAPIST

## 2017-09-29 PROCEDURE — G8982 BODY POS GOAL STATUS: HCPCS | Performed by: PHYSICAL THERAPIST

## 2017-09-29 RX ORDER — MECLIZINE HYDROCHLORIDE 25 MG/1
25 TABLET ORAL 3 TIMES DAILY PRN
Qty: 15 TABLET | Refills: 0 | Status: SHIPPED | OUTPATIENT
Start: 2017-09-29 | End: 2017-10-09

## 2017-09-29 RX ORDER — PREDNISONE 20 MG/1
20 TABLET ORAL 2 TIMES DAILY
Qty: 10 TABLET | Refills: 0 | Status: SHIPPED | OUTPATIENT
Start: 2017-09-29 | End: 2017-10-04

## 2017-09-29 ASSESSMENT — ENCOUNTER SYMPTOMS
SWOLLEN GLANDS: 0
COUGH: 0
ABDOMINAL PAIN: 0
SORE THROAT: 0

## 2017-10-02 ENCOUNTER — OFFICE VISIT (OUTPATIENT)
Dept: NEPHROLOGY | Age: 37
End: 2017-10-02
Payer: COMMERCIAL

## 2017-10-02 ENCOUNTER — HOSPITAL ENCOUNTER (OUTPATIENT)
Dept: PHYSICAL THERAPY | Age: 37
Setting detail: THERAPIES SERIES
Discharge: HOME | End: 2017-10-02
Payer: COMMERCIAL

## 2017-10-02 VITALS
HEART RATE: 72 BPM | HEIGHT: 63 IN | WEIGHT: 150 LBS | BODY MASS INDEX: 26.58 KG/M2 | DIASTOLIC BLOOD PRESSURE: 90 MMHG | SYSTOLIC BLOOD PRESSURE: 128 MMHG

## 2017-10-02 DIAGNOSIS — R82.994 HYPERCALCIURIA: ICD-10-CM

## 2017-10-02 DIAGNOSIS — N20.0 CALCIUM NEPHROLITHIASIS: ICD-10-CM

## 2017-10-02 DIAGNOSIS — Z84.1 FAMILY HISTORY OF KIDNEY STONES: ICD-10-CM

## 2017-10-02 DIAGNOSIS — N20.0 NEPHROLITHIASIS: Primary | ICD-10-CM

## 2017-10-02 PROCEDURE — G8983 BODY POS D/C STATUS: HCPCS | Performed by: PHYSICAL THERAPIST

## 2017-10-02 PROCEDURE — G8982 BODY POS GOAL STATUS: HCPCS | Performed by: PHYSICAL THERAPIST

## 2017-10-02 PROCEDURE — 99213 OFFICE O/P EST LOW 20 MIN: CPT | Performed by: INTERNAL MEDICINE

## 2017-10-02 PROCEDURE — 97112 NEUROMUSCULAR REEDUCATION: CPT | Performed by: PHYSICAL THERAPIST

## 2017-10-02 RX ORDER — HYDROCHLOROTHIAZIDE 25 MG/1
25 TABLET ORAL DAILY
Qty: 30 TABLET | Refills: 3 | Status: SHIPPED | OUTPATIENT
Start: 2017-10-02 | End: 2018-02-13 | Stop reason: SDUPTHER

## 2017-10-02 NOTE — FLOWSHEET NOTE
Physical Therapy Daily Treatment Note    Date:  10/2/2017    Patient Name:  Maryuri Greenberg    :  1980  MRN: [id number]  Restrictions/Precautions:     Medical/Treatment Diagnosis Information:   · Diagnosis: Vertigo   · Treatment Diagnosis: R BPPV  Insurance/Certification information:  PT Insurance Information: [employer]- Medicaid  Physician Information:  Referring Practitioner: Ishaan SHAW  Plan of care signed (Y/N):  y  Visit# / total visits:   Pain level: 0/10   Vertigo Intensity 1-2/5    G-Code (if applicable):      Date G-Code Applied:  17  PT G-Codes  Functional Assessment Tool Used: Motion Sensitivity Quotient  Score: MSQ 39.6 = severe levels  Functional Limitation: Changing and maintaining body position  Changing and Maintaining Body Position Current Status (): At least 80 percent but less than 100 percent impaired, limited or restricted  Changing and Maintaining Body Position Goal Status (): 0 percent impaired, limited or restricted    Time In:12:35   Time Out:1:03    Progress Note: [x]  Yes  []  No  Next due by: Visit #8, or 10/28/17     Subjective:  Overall is better, back to work. Still some problems with looking up/parveen. Objective:   Observation:   Test measurements:    +vertigo with head/neck extension, or laying supine.     Exercises: neuro re-ed for vertigo correction  Exercise/Equipment Resistance/Repetitions Other comments   Canolith Reposition R x2         Occulomotor accommodative ex 5'         Ball toss 2'         Palms to table 5x         Dyson-Daroff habituation ex 8'                              [] Provided verbal/tactile cueing for activities related to strengthening, flexibility, endurance, ROM. (58153)  [x] Provided verbal/tactile cueing for activities related to improving balance, coordination, kinesthetic sense, posture, motor skill, proprioception. (49253)    Therapeutic Activities:     [] Therapeutic activities, direct (one-on-one) patient contact (use of dynamic activities to improve functional performance). (72934)    Gait:   [] Provided training and instruction to the patient for ambulation re-education. (76256)    Self-Care/ADL's  [] Self-care/home management training and compensatory training, meal preparation, safety procedures, and instructions in use of assistive technology devices/adaptive equipment, direct one-on-one contact. (17135)    Home Exercise Program:     [] Reviewed/Progressed HEP activities related to strengthening, flexibility, endurance, ROM. (24624)  [] Reviewed/Progressed HEP activities related to improving balance, coordination, kinesthetic sense, posture, motor skill, proprioception.  (15372)    Manual Treatments:    [] Provided manual therapy to mobilize soft tissue/joints for the purpose of modulating pain, promoting relaxation,  increasing ROM, reducing/eliminating soft tissue swelling/inflammation/restriction, improving soft tissue extensibility.  (88318)    Service Based Modalities:      Timed Code Treatment Minutes:   25' neuro re-ed    Total Treatment Minutes:   25'    Treatment/Activity Tolerance:  [x] Patient tolerated treatment well [] Patient limited by fatique  [] Patient limited by pain  [] Patient limited by other medical complications  [] Other:     Prognosis: [x] Good [] Fair  [] Poor    Patient Requires Follow-up: [x] Yes  [] No      Goals:  Short term goals  Time Frame for Short term goals: 1 week  Short term goal 1: Perform Canolith reposition maneuver  Short term goal 2: Start HEP    Long term goals  Time Frame for Long term goals : 4 weeks  Long term goal 1: Vertigo Intensity 0-1/5  Long term goal 2: Able to complete position changes with min vertigo  Long term goal 3: RTW regular duty          Plan:   [] Continue per plan of care [] Alter current plan (see comments)  [x] Plan of care initiated [] Hold pending MD visit [] Discharge  Plan for Next Session:  [facility] HEP    Electronically signed by:  Josué Wheeler

## 2017-10-02 NOTE — MR AVS SNAPSHOT
[facility] [address] [facility] [phone]      You Were Seen for:         Comments    Nephrolithiasis   [199102]         Vital Signs     Blood Pressure Pulse Height Weight Last Menstrual Period Body Mass Index    128/90 72 5' 3\" (1.6 m) 150 lb (68 kg) 09/08/2017 26.57 kg/m2    Smoking Status                   Former Smoker           Additional Information about your Body Mass Index (BMI)           Your BMI as listed above is considered overweight (25.0-29.9). BMI is an estimate of body fat, calculated from your height and weight. The higher your BMI, the greater your risk of heart disease, high blood pressure, type 2 diabetes, stroke, gallstones, arthritis, sleep apnea, and certain cancers. BMI is not perfect. It may overestimate body fat in athletes and people who are more muscular. If your body fat is high you can improve your BMI by decreasing your calorie intake and becoming more physically active. Learn more at: ReadWave.uk             Today's Medication Changes          These changes are accurate as of: 10/2/17  1:42 PM.  If you have any questions, ask your nurse or doctor. START taking these medications           hydrochlorothiazide 25 MG tablet   Commonly known as:  HYDRODIURIL   Instructions:   Take 1 tablet by mouth daily   Quantity:  30 tablet   Refills:  3   Started by:  Davian Solano MD            Where to Get Your Medications      These medications were sent to [facility] [address] [phone]  [address] [place] [address]     Phone:  [phone]     hydrochlorothiazide 25 MG tablet               Your Current Medications Are              hydrochlorothiazide (HYDRODIURIL) 25 MG tablet Take 1 tablet by mouth daily    predniSONE (DELTASONE) 20 MG tablet Take 1 tablet by mouth 2 times daily for 5 days meclizine (ANTIVERT) 25 MG tablet Take 1 tablet by mouth 3 times daily as needed for Dizziness    Lactobacillus (RA ACIDOPHILUS) 300 MG CAPS take 1 capsule by mouth once daily    azelastine (ASTELIN) 0.1 % nasal spray 2 sprays by Nasal route 2 times daily 2 sprays each side twice/day    fexofenadine (ALLEGRA ALLERGY) 180 MG tablet Take 1 tablet by mouth daily    Norethin-Eth Estrad-Fe Biphas (LO LOESTRIN FE PO) Take 1 tablet by mouth daily    ranitidine (ZANTAC) 300 MG tablet One tablet once daily at bedtime    fluticasone (FLONASE) 50 MCG/ACT nasal spray 2 sprays to each nostril once daily. LORazepam (ATIVAN) 0.5 MG tablet Take 1 tablet by mouth every 8 hours as needed for Anxiety    esomeprazole (NEXIUM) 40 MG delayed release capsule One capsule once daily 30-60 minutes prior to a meal      Allergies              Latex Dermatitis    Amoxil [Amoxicillin] Diarrhea    Bactrim [Sulfamethoxazole-trimethoprim] Itching    Clindamycin/lincomycin     Nausea,vomiting    Flagyl [Metronidazole] Other (See Comments)    Sees bugs crawling    Biaxin [Clarithromycin] Nausea And Vomiting    Ceftin [Cefuroxime Axetil] Diarrhea, Nausea And Vomiting    Keflex [Cephalexin] Nausea And Vomiting         Additional Information        Basic Information     Date Of Birth Sex Race Ethnicity Preferred Language    1980 Female White Non-/Non  English      Problem List as of 10/2/2017  Date Reviewed: 9/29/2017                Vertigo, benign paroxysmal    Nausea & vomiting    Tobacco abuse    GERD (gastroesophageal reflux disease)    Elevated blood pressure reading    Hyperlipidemia      Your Goals as of 10/2/2017 at 1:42 PM                 Lifestyle    Stop Cigarette/Tobacco use (pt-stated)     Notes    Patient Self-Management Goal for Health Maintenance  Goal: I will chose a goal related to tobacco cessation:  I will set a quit date.   Barriers: none   Plan for overcoming my barriers: N/A  Confidence: 5/10 Anticipated Goal Completion Date: 11/2017          Preventive Care        Date Due    Tetanus Combination Vaccine (1 - Tdap) 2/6/1998    Pap Smear 6/12/2016    Yearly Flu Vaccine (1) 9/1/2017            MyChart Signup           Our records indicate that you have an active [employer] [facility] account. You can view your After Visit Summary by going to [email or web address] and logging in with your [employer] username and password. If you don't have a [employer] username and password but a parent or guardian has access to your record, the parent or guardian should login with their own [employer] username and password and access your record to view the After Visit Summary. Additional Information  If you have questions, please contact the physician practice where you receive care. Remember, [employer] is NOT to be used for urgent needs. For medical emergencies, dial 911. For questions regarding your [employer] account call [phone]. If you have a clinical question, please call your doctor's office.

## 2017-10-02 NOTE — PROGRESS NOTES
Nephrology Progress Note    [address] [facility], [address] [place] [address]      Chief Complaint   Patient presents with    Follow-up     6mo austyn reoccuring kidney stones with labs completed   10/2/2017  Patient is here for follow-up of nephrolithiasis. A few weeks ago she did have pain in the right flank area which was severe and she may have passed a kidney stone. 24-hour urine collection was done in urine volume is still significantly low at 1.2 L. Urine oxalate was 23, urine citrate was 824, urine calcium went up to 362 and urine sodium and 171, urine uric acid was 736. She tells me that recently she has been trying to multiple different house and has been eating out quite a bit and has been not watching her salt intake. She tries to increase her fluid intake and this was again emphasized to patient. Her blood pressures in the office today is 128/90, she has no prior history of hypertension although diastolic is a little bit elevated today. 3/7/2017  Patient is here for followup. She is trying to implement the things we talked about her last visit about increasing her hydration adding lemon juice/citrate to her diet, low sodium diet. She did a 24-hour urine after implementing these things  Urine volume was still low at 820 mL's over 24 hours. Urine sodium was 131  Urine oxalate was 23  Urine calcium was 216  Urine citrate was 588  Urine phosphorus was 92  Urine pH was 6.5  PTH level was 36, vitamin D was 50, calcium was 9, creatinine was 0.6. She is trying to drink liquids more than before but still not enough. She occasionally has right flank pain. No dysuria. Pt denies any hx of heavy or prolonged NSAID use and there is no history of OTC herbal medications . No history of dysuria or frequency. Pt denies any hx of recent UTI , incontinence or nocturia or recurrent nephrolithiasis. No unusual skin rashes . No tea coloured urine . No recent procedures involving IV contrast.    Patient Active Problem List   Diagnosis    GERD (gastroesophageal reflux disease)    Elevated blood pressure reading    Hyperlipidemia    Tobacco abuse    Vertigo, benign paroxysmal    Nausea & vomiting       CURRENT MEDICATIONS      Current Outpatient Prescriptions   Medication Sig Dispense Refill    hydrochlorothiazide (HYDRODIURIL) 25 MG tablet Take 1 tablet by mouth daily 30 tablet 3    predniSONE (DELTASONE) 20 MG tablet Take 1 tablet by mouth 2 times daily for 5 days 10 tablet 0    meclizine (ANTIVERT) 25 MG tablet Take 1 tablet by mouth 3 times daily as needed for Dizziness 15 tablet 0    Lactobacillus (RA ACIDOPHILUS) 300 MG CAPS take 1 capsule by mouth once daily 30 capsule 11    azelastine (ASTELIN) 0.1 % nasal spray 2 sprays by Nasal route 2 times daily 2 sprays each side twice/day 1 Bottle 5    fexofenadine (ALLEGRA ALLERGY) 180 MG tablet Take 1 tablet by mouth daily 30 tablet 11    Norethin-Eth Estrad-Fe Biphas (LO LOESTRIN FE PO) Take 1 tablet by mouth daily      ranitidine (ZANTAC) 300 MG tablet One tablet once daily at bedtime 90 tablet 3    fluticasone (FLONASE) 50 MCG/ACT nasal spray 2 sprays to each nostril once daily. 1 Bottle 11    LORazepam (ATIVAN) 0.5 MG tablet Take 1 tablet by mouth every 8 hours as needed for Anxiety 20 tablet 0    esomeprazole (NEXIUM) 40 MG delayed release capsule One capsule once daily 30-60 minutes prior to a meal 30 capsule 11     No current facility-administered medications for this visit. REVIEW OF SYSTEMS     Constitutional: No fever, no chills, no lethargy, no weakness. HEENT:  No headache, otalgia, itchy eyes, nasal discharge or sore throat. Cardiac:  No chest pain, dyspnea, orthopnea or PND. Chest:  No cough, phlegm or wheezing. Abdomen:  No abdominal pain, nausea or vomiting. Neuro:  No focal weakness, abnormal movements or seizure like activity. Skin:   No rashes, no itching.   :   No hematuria, no pyuria, no dysuria, no flank pain. Extremities:  No swelling or joint pains. ROS was otherwise negative except as mentioned in the [address] OBJECTIVE      Vitals:    10/02/17 1312   BP: (!) 128/90   Pulse: 72     Wt Readings from Last 2 Encounters:   10/02/17 150 lb (68 kg)   09/29/17 149 lb (67.6 kg)     Body mass index is 26.57 kg/(m^2). PHYSICAL EXAM      GENERAL APPEARANCE:Awake, alert, in no acute distress  SKIN: warm and dry, no rash or erythema  EYES: conjunctivae normal and sclera anicteric  ENT: no thrush no pharyngeal congestion   NECK:  JVD none. No carotid bruit . No thyromegaly   PULMONARY: clear to auscultation and no wheezing or rhonchi heard. No pleural rub. CADRDIOVASCULAR:  Normal S1 & S2, No S3 or S4 . No pericardial rubs.  Regular rhythm   ABDOMEN: soft nontender, bowel sounds, present, no organomegaly,  no ascites   EXTREMITIES: no cyanosis, clubbing or edema  LABS    CBC:   Lab Results   Component Value Date    WBC 10.6 09/15/2017    RBC 4.31 09/15/2017    HGB 13.7 09/15/2017    HCT 40.6 09/15/2017    MCV 94.4 09/15/2017    MCH 31.7 09/15/2017    MCHC 33.6 09/15/2017    RDW 12.2 09/15/2017     09/15/2017    MPV 9.8 09/15/2017      BMP:   Lab Results   Component Value Date     09/15/2017    K 4.2 09/15/2017     09/15/2017    CO2 25 09/15/2017    BUN 13 09/15/2017    CREATININE 0.51 09/15/2017    GLUCOSE 104 09/15/2017    CALCIUM 9.5 09/15/2017      PHOSPHORUS:    Lab Results   Component Value Date    PHOS 2.6 02/17/2017     MAGNESIUM:   Lab Results   Component Value Date    MG 1.9 01/11/2015     ALBUMIN: No results found for: LABALBU  IRON:  No results found for: IRON  IRON SATURATION:  No results found for: LABIRON  TIBC:  No results found for: TIBC  FERRITIN:  No results found for: FERRITIN  FELIPE: No results found for: FELIPE    SPEP: No results found for: PROT, ALBCAL, ALBCAL, ALBPCT, LABALPH, A1PCT, LABALPH, A2PCT, LABBETA, BETAPCT, GAMGLOB, GGPCT, PATH  UPEP: No results found for: TPU C3: No results found for: C3  C4: No results found for: C4  MPO ANCA: No results found for: MPO . PR3 ANCA:  No results found for: PR3  PTH: No results found for: PTH  VIT D3,25 HYDROXY: No results found for: VITD3                           URINALYSIS/URINE CHEMISTRIES    URINE SODIUM:  No results found for: DALE   URINE CREATININE:    Lab Results   Component Value Date    LABCREA 165.4 09/15/2017     URINE PROTEIN:  No results found for: TPU  URINALYSIS:  U/A:   Lab Results   Component Value Date    NITRU NEGATIVE 09/15/2017    COLORU NOT REPORTED 09/15/2017    PHUR 6.5 09/15/2017    WBCUA 10 TO 25 09/15/2017    RBCUA None 09/15/2017    MUCUS 1+ 09/15/2017    TRICHOMONAS NOT REPORTED 09/15/2017    YEAST NOT REPORTED 09/15/2017    BACTERIA 2+ 09/15/2017    SPECGRAV 1.025 09/15/2017    LEUKOCYTESUR 1+ 09/15/2017    UROBILINOGEN Normal 09/15/2017    BILIRUBINUR NEGATIVE 09/15/2017    GLUCOSEU NEGATIVE 09/15/2017    KETUA NEGATIVE 09/15/2017    AMORPHOUS 1+ 09/15/2017             RADIOLOGY      No results found for this or any previous visit. ASSESSMENT     1. Calcium nephrolithiasis-bilateral nonobstructive  Stone analysis from 2014 shows 90% calcium oxalate and 10%calcium phosphate stone  24-hour urine collection shows low urine volume ,high calcium and high sodium  2. Last stone procedure with lithotripsy in 2016  3. Normal renal function and normal calcium levels  4. GERD    PLAN     1. Review of labs shows her urine volume is significantly low(only 1.2 liters) and she needs to increase her hydration for her urine volume of more than 2 L in order to prevent further stones. This was emphasized to the patient  2. low-sodium diet, continue with high citrate in the diet as discussed. 3.  Add hydrochlorothiazide 25 mg by mouth daily May for hypercalciuria and her blood pressures may tolerated. If she feels dizzy or blood pressures are running low and then may decrease it to 12.5 daily.   4. We will repeat her 24-hour urine collection for urine volume, urine calcium, urine sodium, urine citrate, urine phosphorus, uric acid, oxalate in 6 months  Repeat CBC, BMP in 6 months  5. Follow up with me in 6 months    Patient was asked to avoid NSAIDS. Please do not hesitate to call with questions. This note is created with the assistance of a speech-recognition program. While intending to generate a document that actually reflects the content of the visit, no guarantees can be provided that every mistake has been identified and corrected by editing.     Electronically signed by Rafi Montenegro MD on 10/2/2017 at 2:27 PM

## 2017-10-06 ENCOUNTER — HOSPITAL ENCOUNTER (OUTPATIENT)
Dept: PHYSICAL THERAPY | Age: 37
Setting detail: THERAPIES SERIES
Discharge: HOME | End: 2017-10-06
Payer: COMMERCIAL

## 2017-10-20 ENCOUNTER — OFFICE VISIT (OUTPATIENT)
Dept: FAMILY MEDICINE CLINIC | Age: 37
End: 2017-10-20
Payer: COMMERCIAL

## 2017-10-20 ENCOUNTER — HOSPITAL ENCOUNTER (OUTPATIENT)
Dept: LAB | Age: 37
Setting detail: SPECIMEN
Discharge: HOME | End: 2017-10-20
Payer: COMMERCIAL

## 2017-10-20 VITALS
HEART RATE: 108 BPM | OXYGEN SATURATION: 98 % | DIASTOLIC BLOOD PRESSURE: 88 MMHG | HEIGHT: 63 IN | BODY MASS INDEX: 26.58 KG/M2 | TEMPERATURE: 99.1 F | SYSTOLIC BLOOD PRESSURE: 130 MMHG | WEIGHT: 150 LBS

## 2017-10-20 DIAGNOSIS — R53.83 FATIGUE, UNSPECIFIED TYPE: ICD-10-CM

## 2017-10-20 DIAGNOSIS — F41.9 ANXIETY: ICD-10-CM

## 2017-10-20 DIAGNOSIS — N20.0 NEPHROLITHIASIS: ICD-10-CM

## 2017-10-20 DIAGNOSIS — R10.12 LUQ PAIN: Primary | ICD-10-CM

## 2017-10-20 DIAGNOSIS — R10.12 LUQ PAIN: ICD-10-CM

## 2017-10-20 LAB
ABSOLUTE EOS #: 0.2 K/UL (ref 0–0.4)
ABSOLUTE IMMATURE GRANULOCYTE: NORMAL K/UL (ref 0–0.3)
ABSOLUTE LYMPH #: 2.4 K/UL (ref 1–4.8)
ABSOLUTE MONO #: 0.4 K/UL (ref 0.1–1.2)
AMYLASE: 47 U/L (ref 28–100)
ANION GAP SERPL CALCULATED.3IONS-SCNC: 15 MMOL/L (ref 9–17)
BASOPHILS # BLD: 1 % (ref 0–1)
BASOPHILS ABSOLUTE: 0 K/UL (ref 0–0.2)
BUN BLDV-MCNC: 12 MG/DL (ref 6–20)
BUN/CREAT BLD: 18 (ref 9–20)
CALCIUM SERPL-MCNC: 9.4 MG/DL (ref 8.6–10.4)
CHLORIDE BLD-SCNC: 103 MMOL/L (ref 98–107)
CO2: 25 MMOL/L (ref 20–31)
CREAT SERPL-MCNC: 0.67 MG/DL (ref 0.5–0.9)
DIFFERENTIAL TYPE: NORMAL
EOSINOPHILS RELATIVE PERCENT: 2 % (ref 1–7)
GFR AFRICAN AMERICAN: >60 ML/MIN
GFR NON-AFRICAN AMERICAN: >60 ML/MIN
GFR SERPL CREATININE-BSD FRML MDRD: ABNORMAL ML/MIN/{1.73_M2}
GFR SERPL CREATININE-BSD FRML MDRD: ABNORMAL ML/MIN/{1.73_M2}
GLUCOSE BLD-MCNC: 221 MG/DL (ref 70–99)
HCT VFR BLD CALC: 39 % (ref 36–46)
HEMOGLOBIN: 13.5 G/DL (ref 12–16)
IMMATURE GRANULOCYTES: NORMAL %
LIPASE: 32 U/L (ref 13–60)
LYMPHOCYTES # BLD: 26 % (ref 16–46)
MCH RBC QN AUTO: 32.6 PG (ref 26–34)
MCHC RBC AUTO-ENTMCNC: 34.6 G/DL (ref 31–37)
MCV RBC AUTO: 94.2 FL (ref 80–100)
MONOCYTES # BLD: 4 % (ref 4–11)
MONONUCLEOSIS SCREEN: NEGATIVE
PDW BLD-RTO: 12.4 % (ref 11–14.5)
PLATELET # BLD: 200 K/UL (ref 140–450)
PLATELET ESTIMATE: NORMAL
PMV BLD AUTO: 9.5 FL (ref 6–12)
POTASSIUM SERPL-SCNC: 3.8 MMOL/L (ref 3.7–5.3)
RBC # BLD: 4.14 M/UL (ref 4–5.2)
RBC # BLD: NORMAL 10*6/UL
SEG NEUTROPHILS: 67 % (ref 43–77)
SEGMENTED NEUTROPHILS ABSOLUTE COUNT: 6.3 K/UL (ref 1.8–7.7)
SODIUM BLD-SCNC: 143 MMOL/L (ref 135–144)
WBC # BLD: 9.2 K/UL (ref 3.5–11)
WBC # BLD: NORMAL 10*3/UL

## 2017-10-20 PROCEDURE — 82150 ASSAY OF AMYLASE: CPT

## 2017-10-20 PROCEDURE — 99214 OFFICE O/P EST MOD 30 MIN: CPT | Performed by: FAMILY MEDICINE

## 2017-10-20 PROCEDURE — 83690 ASSAY OF LIPASE: CPT

## 2017-10-20 PROCEDURE — 80048 BASIC METABOLIC PNL TOTAL CA: CPT

## 2017-10-20 PROCEDURE — G8417 CALC BMI ABV UP PARAM F/U: HCPCS | Performed by: FAMILY MEDICINE

## 2017-10-20 PROCEDURE — G8427 DOCREV CUR MEDS BY ELIG CLIN: HCPCS | Performed by: FAMILY MEDICINE

## 2017-10-20 PROCEDURE — 1036F TOBACCO NON-USER: CPT | Performed by: FAMILY MEDICINE

## 2017-10-20 PROCEDURE — 85025 COMPLETE CBC W/AUTO DIFF WBC: CPT

## 2017-10-20 PROCEDURE — 86308 HETEROPHILE ANTIBODY SCREEN: CPT

## 2017-10-20 PROCEDURE — G8484 FLU IMMUNIZE NO ADMIN: HCPCS | Performed by: FAMILY MEDICINE

## 2017-10-20 PROCEDURE — 36415 COLL VENOUS BLD VENIPUNCTURE: CPT

## 2017-10-20 RX ORDER — LORAZEPAM 0.5 MG/1
0.5 TABLET ORAL EVERY 8 HOURS PRN
Qty: 20 TABLET | Refills: 0 | Status: SHIPPED | OUTPATIENT
Start: 2017-10-20 | End: 2019-02-26 | Stop reason: SDUPTHER

## 2017-10-20 ASSESSMENT — PATIENT HEALTH QUESTIONNAIRE - PHQ9
2. FEELING DOWN, DEPRESSED OR HOPELESS: 0
1. LITTLE INTEREST OR PLEASURE IN DOING THINGS: 0
SUM OF ALL RESPONSES TO PHQ9 QUESTIONS 1 & 2: 0
SUM OF ALL RESPONSES TO PHQ QUESTIONS 1-9: 0

## 2017-10-20 ASSESSMENT — ENCOUNTER SYMPTOMS
NAUSEA: 0
SINUS PRESSURE: 1
CONSTIPATION: 0
VOMITING: 0
SHORTNESS OF BREATH: 0
ABDOMINAL PAIN: 1
BLOOD IN STOOL: 0
WHEEZING: 0
COUGH: 0
DIARRHEA: 0

## 2017-10-20 NOTE — PROGRESS NOTES
ureteroscopic lithotripsy    FL EGD TRANSORAL BIOPSY SINGLE/MULTIPLE N/A 3/13/2017    EGD BIOPSY performed by Madi Duron MD at [address] Bilateral     approximately 2013    TUBAL LIGATION      UPPER GASTROINTESTINAL ENDOSCOPY  2010    hiatal hernia     UPPER GASTROINTESTINAL ENDOSCOPY  03/13/2017    mild gastritis, Dr. Tonya Noonan     Family History   Problem Relation Age of Onset    High Blood Pressure Mother     Stroke Mother     Thyroid Disease Mother     Diabetes Father     High Blood Pressure Father     Cancer Father      Bladder     Social History   Substance Use Topics    Smoking status: Former Smoker     Packs/day: 0.50     Years: 4.00     Types: Cigarettes     Quit date: 2/1/2017    Smokeless tobacco: Never Used    Alcohol use Yes      Comment: social      Current Outpatient Prescriptions   Medication Sig Dispense Refill    LORazepam (ATIVAN) 0.5 MG tablet Take 1 tablet by mouth every 8 hours as needed for Anxiety 20 tablet 0    hydrochlorothiazide (HYDRODIURIL) 25 MG tablet Take 1 tablet by mouth daily 30 tablet 3    Lactobacillus (RA ACIDOPHILUS) 300 MG CAPS take 1 capsule by mouth once daily 30 capsule 11    azelastine (ASTELIN) 0.1 % nasal spray 2 sprays by Nasal route 2 times daily 2 sprays each side twice/day 1 Bottle 5    fexofenadine (ALLEGRA ALLERGY) 180 MG tablet Take 1 tablet by mouth daily 30 tablet 11    Norethin-Eth Estrad-Fe Biphas (LO LOESTRIN FE PO) Take 1 tablet by mouth daily      ranitidine (ZANTAC) 300 MG tablet One tablet once daily at bedtime 90 tablet 3    fluticasone (FLONASE) 50 MCG/ACT nasal spray 2 sprays to each nostril once daily. 1 Bottle 11    esomeprazole (NEXIUM) 40 MG delayed release capsule One capsule once daily 30-60 minutes prior to a meal 30 capsule 11     No current facility-administered medications for this visit.       Allergies   Allergen Reactions    Latex Dermatitis    Amoxil [Amoxicillin] Diarrhea    Bactrim [Sulfamethoxazole-Trimethoprim] Itching    Clindamycin/Lincomycin      Nausea,vomiting    Flagyl [Metronidazole] Other (See Comments)     Sees bugs crawling    Biaxin [Clarithromycin] Nausea And Vomiting    Ceftin [Cefuroxime Axetil] Diarrhea and Nausea And Vomiting    Keflex [Cephalexin] Nausea And Vomiting       Health Maintenance   Topic Date Due    DTaP/Tdap/Td vaccine (1 - Tdap) 02/06/1998    Cervical cancer screen  06/12/2016    Flu vaccine (1) 09/01/2017    HIV screen  Completed       Subjective:      Review of Systems   Constitutional: Positive for diaphoresis (started this week after she started HCTZ for Nephrology) and fatigue (feels like she could just fall asleep every night when she gets home for work). Negative for chills and fever. HENT: Positive for congestion (mild) and sinus pressure (recurrent, intermittent). Negative for ear pain. Respiratory: Negative for cough, shortness of breath and wheezing. Cardiovascular: Negative for chest pain and palpitations. Gastrointestinal: Positive for abdominal pain (LUQ). Negative for blood in stool, constipation, diarrhea, nausea and vomiting. Genitourinary: Negative for dysuria, frequency (with the addition of HCTZ), genital sores, hematuria and vaginal discharge. Skin: Negative for rash (Had rash on her upper body approx a week ago - went away on its own after 5-7 days) and wound. Neurological: Positive for dizziness (Had vertigo a couple weeks ago, but not completely resolved yet - improving). Hematological: Positive for adenopathy (b/l sides of her neck). Objective:     Vitals:    10/20/17 1527   BP: 130/88   Pulse: 108   Temp: 99.1 °F (37.3 °C)   TempSrc: Tympanic   SpO2: 98%   Weight: 150 lb (68 kg)   Height: 5' 3\" (1.6 m)     Physical Exam   Constitutional: She is oriented to person, place, and time. She appears well-developed and well-nourished. No distress. HENT:   Head: Normocephalic and atraumatic.    Eyes:

## 2017-10-20 NOTE — PROGRESS NOTES
Chronic Disease Visit Information    BP Readings from Last 3 Encounters:   10/20/17 130/88   10/02/17 (!) 128/90   09/29/17 (!) 140/100          Hemoglobin A1C (%)   Date Value   05/05/2017 6.0 (H)   01/10/2015 5.7     BUN (mg/dL)   Date Value   09/15/2017 13     CREATININE (mg/dL)   Date Value   09/15/2017 0.51     Glucose (mg/dL)   Date Value   09/15/2017 104 (H)            Have you changed or started any medications since your last visit including any over-the-counter medicines, vitamins, or herbal medicines? no   Are you having any side effects from any of your medications? -  no  Have you stopped taking any of your medications? Is so, why? -  no    Have you seen any other physician or provider since your last visit? Yes - Records Obtained  Have you had any other diagnostic tests since your last visit? Yes - Records Obtained  Have you been seen in the emergency room and/or had an admission to a hospital since we last saw you? No  Have you had your annual diabetic retinal (eye) exam? No  Have you had your routine dental cleaning in the past 6 months? no    Have you activated your [employer] account? If not, what are your barriers?  Yes     Patient Care Team:  Arpit Richey DO as PCP - General         Medical History Review  Past Medical, Family, and Social History reviewed and does contribute to the patient presenting condition    Health Maintenance   Topic Date Due    DTaP/Tdap/Td vaccine (1 - Tdap) 02/06/1998    Cervical cancer screen  06/12/2016    Flu vaccine (1) 09/01/2017    HIV screen  Completed

## 2017-10-20 NOTE — PROGRESS NOTES
Emily received counseling on the following healthy behaviors: nutrition  Reviewed prior labs and health maintenance  Continue current medications, diet and exercise. Discussed use, benefit, and side effects of prescribed medications. Barriers to medication compliance addressed. Patient given educational materials - see patient instructions  Was a self-tracking handout given in paper form or via [employer]? No    Requested Prescriptions     Pending Prescriptions Disp Refills    LORazepam (ATIVAN) 0.5 MG tablet 20 tablet 0     Sig: Take 1 tablet by mouth every 8 hours as needed for Anxiety       All patient questions answered. Patient voiced understanding. Quality Measures    Body mass index is 26.57 kg/m². Elevated. Weight control planned discussed Healthy diet and regular exercise. BP: 130/88 Blood pressure is normal. Treatment plan consists of No treatment change needed.     No results found for: LDLCALC, LDLCHOLESTEROL, LDLDIRECT (goal LDL reduction with dx if diabetes is 50% LDL reduction)      PHQ Scores 10/20/2017 5/26/2017   PHQ2 Score 0 0   PHQ9 Score 0 0     Interpretation of Total Score Depression Severity: 1-4 = Minimal depression, 5-9 = Mild depression, 10-14 = Moderate depression, 15-19 = Moderately severe depression, 20-27 = Severe depression

## 2017-11-03 ENCOUNTER — HOSPITAL ENCOUNTER (OUTPATIENT)
Dept: ULTRASOUND IMAGING | Age: 37
Discharge: HOME | End: 2017-11-03
Payer: COMMERCIAL

## 2017-11-03 ENCOUNTER — HOSPITAL ENCOUNTER (OUTPATIENT)
Dept: CT IMAGING | Age: 37
Discharge: HOME | End: 2017-11-03
Payer: COMMERCIAL

## 2017-11-03 ENCOUNTER — HOSPITAL ENCOUNTER (OUTPATIENT)
Dept: MAMMOGRAPHY | Age: 37
Discharge: HOME | End: 2017-11-03
Payer: COMMERCIAL

## 2017-11-03 DIAGNOSIS — R10.12 LUQ PAIN: ICD-10-CM

## 2017-11-03 DIAGNOSIS — R59.9 LYMPH NODES ENLARGED: ICD-10-CM

## 2017-11-03 DIAGNOSIS — N63.0 BREAST LUMP IN FEMALE: ICD-10-CM

## 2017-11-03 DIAGNOSIS — Z12.31 SCREENING MAMMOGRAM, ENCOUNTER FOR: ICD-10-CM

## 2017-11-03 DIAGNOSIS — R73.01 IFG (IMPAIRED FASTING GLUCOSE): Primary | ICD-10-CM

## 2017-11-03 PROCEDURE — 76705 ECHO EXAM OF ABDOMEN: CPT

## 2017-11-03 PROCEDURE — G0204 DX MAMMO INCL CAD BI: HCPCS

## 2017-11-03 PROCEDURE — 76642 ULTRASOUND BREAST LIMITED: CPT

## 2017-11-03 PROCEDURE — 6360000004 HC RX CONTRAST MEDICATION: Performed by: INTERNAL MEDICINE

## 2017-11-03 PROCEDURE — 70491 CT SOFT TISSUE NECK W/DYE: CPT

## 2017-11-03 RX ADMIN — IOHEXOL 100 ML: 350 INJECTION, SOLUTION INTRAVENOUS at 13:54

## 2017-11-07 ENCOUNTER — TELEPHONE (OUTPATIENT)
Dept: INTERNAL MEDICINE CLINIC | Age: 37
End: 2017-11-07

## 2017-11-07 NOTE — TELEPHONE ENCOUNTER
Patient called requesting results of her testing you ordered at the 9/8/17 appointment . Patient has been unable to keep her 4 week follow-up as she needs to schedule on Friday. Informed patient that you are not in the office on Thursdays or Fridays and patient stated that they are short handed at work currently but she can schedule a January appointment . Patient is scheduled for Wednesday 01/10/18 . Patient's T4, Free was 0.89 and TSH was 0.96 on 9/15/17 and CT neck and mammogram done 11/3/17 along with bilateral breast US and Spleen US ordered by the family doctor. Patient wants to know if there is anything to be concerned about on any of the results . Please advise. Sirisha Palma

## 2017-11-08 NOTE — TELEPHONE ENCOUNTER
Patient canceled her appointments as she can currently only come on Fridays due to her work schedule. Patient scheduled for 01/10/2018 as she can schedule a day off from work . Patient just asking if there is any concern with waiting until January .

## 2017-11-08 NOTE — TELEPHONE ENCOUNTER
Left message per HIPAA form that Dr. Benedicto Richardson reviewed her test results and can wait for January appointment.

## 2017-11-10 ENCOUNTER — TELEPHONE (OUTPATIENT)
Dept: FAMILY MEDICINE CLINIC | Age: 37
End: 2017-11-10

## 2017-11-17 ENCOUNTER — TELEPHONE (OUTPATIENT)
Dept: PRIMARY CARE CLINIC | Age: 37
End: 2017-11-17

## 2017-11-17 ENCOUNTER — TELEPHONE (OUTPATIENT)
Dept: FAMILY MEDICINE CLINIC | Age: 37
End: 2017-11-17

## 2017-12-15 ENCOUNTER — TELEPHONE (OUTPATIENT)
Dept: FAMILY MEDICINE CLINIC | Age: 37
End: 2017-12-15

## 2017-12-15 NOTE — TELEPHONE ENCOUNTER
[phone]  Please call to reschedule tues appt req a Friday. ..  If available tues appt for joseph cancelled

## 2018-01-06 ENCOUNTER — OFFICE VISIT (OUTPATIENT)
Dept: PRIMARY CARE CLINIC | Age: 38
End: 2018-01-06
Payer: COMMERCIAL

## 2018-01-06 VITALS
BODY MASS INDEX: 28.03 KG/M2 | WEIGHT: 158.2 LBS | DIASTOLIC BLOOD PRESSURE: 88 MMHG | HEIGHT: 63 IN | OXYGEN SATURATION: 98 % | TEMPERATURE: 99.4 F | SYSTOLIC BLOOD PRESSURE: 136 MMHG | HEART RATE: 102 BPM

## 2018-01-06 DIAGNOSIS — J01.40 ACUTE NON-RECURRENT PANSINUSITIS: Primary | ICD-10-CM

## 2018-01-06 PROCEDURE — 99213 OFFICE O/P EST LOW 20 MIN: CPT | Performed by: NURSE PRACTITIONER

## 2018-01-06 PROCEDURE — G8427 DOCREV CUR MEDS BY ELIG CLIN: HCPCS | Performed by: NURSE PRACTITIONER

## 2018-01-06 PROCEDURE — G8417 CALC BMI ABV UP PARAM F/U: HCPCS | Performed by: NURSE PRACTITIONER

## 2018-01-06 PROCEDURE — 1036F TOBACCO NON-USER: CPT | Performed by: NURSE PRACTITIONER

## 2018-01-06 PROCEDURE — G8484 FLU IMMUNIZE NO ADMIN: HCPCS | Performed by: NURSE PRACTITIONER

## 2018-01-06 RX ORDER — LEVOFLOXACIN 500 MG/1
500 TABLET, FILM COATED ORAL DAILY
Qty: 7 TABLET | Refills: 0 | Status: SHIPPED | OUTPATIENT
Start: 2018-01-06 | End: 2019-10-18 | Stop reason: SDUPTHER

## 2018-01-06 ASSESSMENT — ENCOUNTER SYMPTOMS
ABDOMINAL PAIN: 0
DIARRHEA: 0
PHOTOPHOBIA: 1
COUGH: 0
VOMITING: 0
RHINORRHEA: 1
NAUSEA: 0
SORE THROAT: 0
SINUS PRESSURE: 1
RESPIRATORY NEGATIVE: 1
BLURRED VISION: 0

## 2018-01-06 NOTE — PROGRESS NOTES
Nausea & vomiting        Current medications are:  Current Outpatient Prescriptions   Medication Sig Dispense Refill    levofloxacin (LEVAQUIN) 500 MG tablet Take 1 tablet by mouth daily for 7 days 7 tablet 0    LORazepam (ATIVAN) 0.5 MG tablet Take 1 tablet by mouth every 8 hours as needed for Anxiety 20 tablet 0    hydrochlorothiazide (HYDRODIURIL) 25 MG tablet Take 1 tablet by mouth daily 30 tablet 3    Lactobacillus (RA ACIDOPHILUS) 300 MG CAPS take 1 capsule by mouth once daily 30 capsule 11    azelastine (ASTELIN) 0.1 % nasal spray 2 sprays by Nasal route 2 times daily 2 sprays each side twice/day 1 Bottle 5    fexofenadine (ALLEGRA ALLERGY) 180 MG tablet Take 1 tablet by mouth daily 30 tablet 11    Norethin-Eth Estrad-Fe Biphas (LO LOESTRIN FE PO) Take 1 tablet by mouth daily      ranitidine (ZANTAC) 300 MG tablet One tablet once daily at bedtime 90 tablet 3    fluticasone (FLONASE) 50 MCG/ACT nasal spray 2 sprays to each nostril once daily. 1 Bottle 11    esomeprazole (NEXIUM) 40 MG delayed release capsule One capsule once daily 30-60 minutes prior to a meal 30 capsule 11     No current facility-administered medications for this visit. She is allergic to latex; amoxil [amoxicillin]; bactrim [sulfamethoxazole-trimethoprim]; clindamycin/lincomycin; flagyl [metronidazole]; biaxin [clarithromycin]; ceftin [cefuroxime axetil]; and keflex [cephalexin]. .    She  reports that she quit smoking about 11 months ago. Her smoking use included Cigarettes. She has a 2.00 pack-year smoking history. She has never used smokeless tobacco.      Objective:    Vitals:    01/06/18 1706   BP: 136/88   Pulse: 102   Temp: 99.4 °F (37.4 °C)   SpO2: 98%     Body mass index is 28.02 kg/m². Review of Systems   Constitutional: Negative for appetite change, chills and fever. HENT: Positive for ear pain, rhinorrhea and sinus pressure. Negative for ear discharge, hearing loss and sore throat.     Eyes: Positive for Rusty Govea NP on 1/6/18 at 5:26 PM

## 2018-02-09 ENCOUNTER — OFFICE VISIT (OUTPATIENT)
Dept: FAMILY MEDICINE CLINIC | Age: 38
End: 2018-02-09
Payer: COMMERCIAL

## 2018-02-09 VITALS
BODY MASS INDEX: 27.64 KG/M2 | WEIGHT: 156 LBS | HEIGHT: 63 IN | DIASTOLIC BLOOD PRESSURE: 90 MMHG | SYSTOLIC BLOOD PRESSURE: 126 MMHG | HEART RATE: 84 BPM

## 2018-02-09 DIAGNOSIS — J34.9 SINUS PROBLEM: ICD-10-CM

## 2018-02-09 DIAGNOSIS — Z87.09 HISTORY OF ALLERGIC RHINITIS: ICD-10-CM

## 2018-02-09 DIAGNOSIS — K21.9 GASTROESOPHAGEAL REFLUX DISEASE, ESOPHAGITIS PRESENCE NOT SPECIFIED: ICD-10-CM

## 2018-02-09 DIAGNOSIS — K21.9 LPRD (LARYNGOPHARYNGEAL REFLUX DISEASE): Primary | ICD-10-CM

## 2018-02-09 PROCEDURE — 1036F TOBACCO NON-USER: CPT | Performed by: NURSE PRACTITIONER

## 2018-02-09 PROCEDURE — 99213 OFFICE O/P EST LOW 20 MIN: CPT | Performed by: NURSE PRACTITIONER

## 2018-02-09 PROCEDURE — G8417 CALC BMI ABV UP PARAM F/U: HCPCS | Performed by: NURSE PRACTITIONER

## 2018-02-09 PROCEDURE — G8427 DOCREV CUR MEDS BY ELIG CLIN: HCPCS | Performed by: NURSE PRACTITIONER

## 2018-02-09 PROCEDURE — G8484 FLU IMMUNIZE NO ADMIN: HCPCS | Performed by: NURSE PRACTITIONER

## 2018-02-09 RX ORDER — LORATADINE 10 MG/1
10 TABLET ORAL DAILY
COMMUNITY
End: 2018-11-29 | Stop reason: ALTCHOICE

## 2018-02-09 NOTE — PROGRESS NOTES
Subjective:      Patient ID: Issa Brock is a 43 y.o. female. HPI Presents for follow up. She is concerned about ongoing swollen lymph nodes which were previously evaluated as benign. She also continues to have problems with intermittent sinus congestion and pressure. She is taking her reflux medications daily. She uses flonase most days. She has not done sinus rinses for months. Review of Systems   See symptom questionnaires and history as above. All other systems reviewed and are negative as pertaining to this appointment. Grade in school/occupation: chiropractic assistant  Patient lives with: davey and zofia, davey's children visit    Objective:   Physical Exam     Constitutional  Appears stated age. Head normocephalic and atraumatic. Psych  Alert and oriented. Pleasant and cooperative. Chest  Rises and falls symmetrically with no evidence of respiratory distress. Lungs  0/40 - no wheeze or other adventitious breath sounds    Nose  Pallor: 2-Healthy/Pink    Bogginess: 2-turbinate occupies less than or equal to 50% of the diameter of naris    Wetness: 2-scant secretions    Redness: 2-healthy pink    Mouth  Pharynx clear, uvula midline, dentition WDL. Ears  Ear canals WDL, TM's nisreen grey with visible light reflex. Eyes  Pupils equal and reactive. EOM's WDL. Heart  Heart rate regular. Rhythm without murmur, click, rub or gallop. Skin  Warm, dry and intact. Neck  Supple. ROM WDL for age. No lymphadenopathy or thyromegaly. Musculoskeletal  ROM WDL for stated age. Extremities without clubbing, cyanosis or edema.        Assessment:      LPR  GERD  History of allergic rhinitis  Sinus problems      Plan:      Continue present medications  Resume sinus rinses  RV 6 months

## 2018-02-09 NOTE — PROGRESS NOTES
Symptoms experienced by patient  Runny nose  No Circles under eyes Yes Post nasal drip No Difficulty breathing Yes   Stuffy nose Yes Grumpiness Yes Hoarseness No Short of breath No   Sniffling  No Fatigue Yes Face pain/pressure Yes Tight/heavy chest Yes   Sneezing No Nosebleeds No Sore throat No cough No   Blowing nose No Nasal/sinus surgery Yes Headache  Yes Cough up mucus No   Itchy/teary eyes No Nasal polyps No Upper teeth hurt No Cough of blood No   Itchy ears No Hearing loss No Night cough No Chest pain No   Itchy nose No Ear problems Yes Throat clearing No Asthma No   Itchy throat No Tubes in ears No Bad breath No Wheezing No   Itchy roof of mouth No Snoring Yes Bad taste in mouth No Pneumonia No   Nose rubbing No Mouth breathing No  Ankle swelling No    Overbite No  Difficulty breathing on exertion No    Drooling (toddler) No            Does patient experience? Heartburn and indigestion Yes  Vomiting easily No  Use of antacids (Rolaids, Tums, Maalox) Yes  Regurgitation of stomach contents No  Chronic cough worse after meals No  Chronic cough cough worse after laying down No  Chronic hoarseness or voice change No  Chest pain No  Stomach pain No  Neck pain Yes  Sore throat-frequent No  Feeling of throat closing or something stuck in throat No  Frequent burps or hiccups No  Adult onset asthma No  Asthma not relieved with usual treatment No  Anemia No  Asthma worse after meals, alcohol, lying down, bending to tie shoes, or after heartburn No  Chronic sinus disease No    Within the last month, how did the following problems affect the patient?   0=No Problem; 5=Severe Problem    Hoarseness or a problem with your voice 3  Clearing your throat 3  Excess throat mucus or postnasal drip 2  Difficulty swallowing food, liquids, pills 0  Coughing after you ate or after lying down 0  Breathing difficulties or choking episodes 0  Troublesome or annoying cough 0  Sensations of something sticking in your throat or a lump in your throat 0  Heartburn, chest pain, indigestion, or stomach acid coming up 5    Total: 13

## 2018-02-14 RX ORDER — HYDROCHLOROTHIAZIDE 25 MG/1
TABLET ORAL
Qty: 30 TABLET | Refills: 3 | Status: SHIPPED | OUTPATIENT
Start: 2018-02-14 | End: 2018-09-29

## 2018-03-27 DIAGNOSIS — K21.9 LPRD (LARYNGOPHARYNGEAL REFLUX DISEASE): ICD-10-CM

## 2018-03-27 DIAGNOSIS — J30.1 CHRONIC SEASONAL ALLERGIC RHINITIS DUE TO POLLEN: ICD-10-CM

## 2018-03-27 DIAGNOSIS — K21.9 GASTROESOPHAGEAL REFLUX DISEASE, ESOPHAGITIS PRESENCE NOT SPECIFIED: ICD-10-CM

## 2018-03-27 RX ORDER — FLUTICASONE PROPIONATE 50 MCG
SPRAY, SUSPENSION (ML) NASAL
Qty: 3 BOTTLE | Refills: 3 | Status: SHIPPED | OUTPATIENT
Start: 2018-03-27 | End: 2019-12-11 | Stop reason: SDUPTHER

## 2018-03-27 RX ORDER — RANITIDINE 300 MG/1
TABLET ORAL
Qty: 90 TABLET | Refills: 3 | Status: SHIPPED | OUTPATIENT
Start: 2018-03-27 | End: 2019-05-06 | Stop reason: SDUPTHER

## 2018-04-02 ENCOUNTER — APPOINTMENT (OUTPATIENT)
Dept: GENERAL RADIOLOGY | Age: 38
End: 2018-04-02
Payer: COMMERCIAL

## 2018-04-02 ENCOUNTER — HOSPITAL ENCOUNTER (EMERGENCY)
Age: 38
Discharge: HOME | End: 2018-04-02
Attending: EMERGENCY MEDICINE
Payer: COMMERCIAL

## 2018-04-02 VITALS
DIASTOLIC BLOOD PRESSURE: 99 MMHG | OXYGEN SATURATION: 95 % | RESPIRATION RATE: 20 BRPM | TEMPERATURE: 99.5 F | HEART RATE: 95 BPM | WEIGHT: 140 LBS | BODY MASS INDEX: 24.8 KG/M2 | SYSTOLIC BLOOD PRESSURE: 151 MMHG | HEIGHT: 63 IN

## 2018-04-02 DIAGNOSIS — J40 BRONCHITIS: Primary | ICD-10-CM

## 2018-04-02 PROCEDURE — 94640 AIRWAY INHALATION TREATMENT: CPT

## 2018-04-02 PROCEDURE — 6360000002 HC RX W HCPCS: Performed by: EMERGENCY MEDICINE

## 2018-04-02 PROCEDURE — 71046 X-RAY EXAM CHEST 2 VIEWS: CPT

## 2018-04-02 PROCEDURE — 99283 EMERGENCY DEPT VISIT LOW MDM: CPT

## 2018-04-02 RX ORDER — GUAIFENESIN AND CODEINE PHOSPHATE 100; 10 MG/5ML; MG/5ML
5 SOLUTION ORAL 3 TIMES DAILY PRN
Qty: 118 ML | Refills: 0 | Status: SHIPPED | OUTPATIENT
Start: 2018-04-02 | End: 2018-04-05

## 2018-04-02 RX ORDER — ALBUTEROL SULFATE 2.5 MG/3ML
2.5 SOLUTION RESPIRATORY (INHALATION) ONCE
Status: COMPLETED | OUTPATIENT
Start: 2018-04-02 | End: 2018-04-02

## 2018-04-02 RX ADMIN — ALBUTEROL SULFATE 2.5 MG: 2.5 SOLUTION RESPIRATORY (INHALATION) at 11:59

## 2018-04-02 ASSESSMENT — PAIN DESCRIPTION - PAIN TYPE: TYPE: ACUTE PAIN

## 2018-04-02 ASSESSMENT — PAIN SCALES - GENERAL
PAINLEVEL_OUTOF10: 8
PAINLEVEL_OUTOF10: 8

## 2018-04-02 ASSESSMENT — PAIN DESCRIPTION - DESCRIPTORS: DESCRIPTORS: PRESSURE

## 2018-04-02 ASSESSMENT — PAIN DESCRIPTION - FREQUENCY: FREQUENCY: CONTINUOUS

## 2018-04-02 ASSESSMENT — PAIN DESCRIPTION - LOCATION: LOCATION: CHEST

## 2018-04-13 ENCOUNTER — TELEPHONE (OUTPATIENT)
Dept: PRIMARY CARE CLINIC | Age: 38
End: 2018-04-13

## 2018-05-23 ENCOUNTER — TELEPHONE (OUTPATIENT)
Dept: PRIMARY CARE CLINIC | Age: 38
End: 2018-05-23

## 2018-06-08 ENCOUNTER — HOSPITAL ENCOUNTER (OUTPATIENT)
Dept: LAB | Age: 38
Setting detail: SPECIMEN
Discharge: HOME | End: 2018-06-08
Payer: COMMERCIAL

## 2018-06-08 DIAGNOSIS — R73.01 IFG (IMPAIRED FASTING GLUCOSE): ICD-10-CM

## 2018-06-08 LAB
ESTIMATED AVERAGE GLUCOSE: 214 MG/DL
GLUCOSE FASTING: 191 MG/DL (ref 70–99)
HBA1C MFR BLD: 9.1 % (ref 4.8–5.9)

## 2018-06-08 PROCEDURE — 82947 ASSAY GLUCOSE BLOOD QUANT: CPT

## 2018-06-08 PROCEDURE — 83036 HEMOGLOBIN GLYCOSYLATED A1C: CPT

## 2018-06-08 PROCEDURE — 36415 COLL VENOUS BLD VENIPUNCTURE: CPT

## 2018-06-15 ENCOUNTER — APPOINTMENT (OUTPATIENT)
Dept: CT IMAGING | Age: 38
End: 2018-06-15
Payer: COMMERCIAL

## 2018-06-15 ENCOUNTER — HOSPITAL ENCOUNTER (EMERGENCY)
Age: 38
Discharge: HOME | End: 2018-06-15
Attending: EMERGENCY MEDICINE
Payer: COMMERCIAL

## 2018-06-15 VITALS
RESPIRATION RATE: 14 BRPM | HEIGHT: 63 IN | OXYGEN SATURATION: 96 % | BODY MASS INDEX: 26.58 KG/M2 | DIASTOLIC BLOOD PRESSURE: 92 MMHG | TEMPERATURE: 98.9 F | SYSTOLIC BLOOD PRESSURE: 142 MMHG | WEIGHT: 150 LBS | HEART RATE: 70 BPM

## 2018-06-15 DIAGNOSIS — N39.0 URINARY TRACT INFECTION WITHOUT HEMATURIA, SITE UNSPECIFIED: Primary | ICD-10-CM

## 2018-06-15 LAB
-: ABNORMAL
AMORPHOUS: ABNORMAL
BACTERIA: ABNORMAL
BILIRUBIN URINE: NEGATIVE
CASTS UA: ABNORMAL /LPF (ref 0–2)
COLOR: ABNORMAL
COMMENT UA: ABNORMAL
CRYSTALS, UA: ABNORMAL /HPF
EPITHELIAL CELLS UA: ABNORMAL /HPF (ref 0–5)
GLUCOSE URINE: ABNORMAL
HCG(URINE) PREGNANCY TEST: NEGATIVE
KETONES, URINE: NEGATIVE
LEUKOCYTE ESTERASE, URINE: ABNORMAL
MUCUS: ABNORMAL
NITRITE, URINE: NEGATIVE
OTHER OBSERVATIONS UA: ABNORMAL
PH UA: 6 (ref 5–6)
PROTEIN UA: NEGATIVE
RBC UA: ABNORMAL /HPF (ref 0–4)
RENAL EPITHELIAL, UA: ABNORMAL /HPF
SPECIFIC GRAVITY UA: 1.03 (ref 1.01–1.02)
TRICHOMONAS: ABNORMAL
TURBIDITY: ABNORMAL
URINE HGB: NEGATIVE
UROBILINOGEN, URINE: NORMAL
WBC UA: ABNORMAL /HPF (ref 0–4)
YEAST: ABNORMAL

## 2018-06-15 PROCEDURE — 84703 CHORIONIC GONADOTROPIN ASSAY: CPT

## 2018-06-15 PROCEDURE — 81001 URINALYSIS AUTO W/SCOPE: CPT

## 2018-06-15 PROCEDURE — 74176 CT ABD & PELVIS W/O CONTRAST: CPT

## 2018-06-15 PROCEDURE — 99284 EMERGENCY DEPT VISIT MOD MDM: CPT

## 2018-06-15 RX ORDER — CIPROFLOXACIN 500 MG/1
500 TABLET, FILM COATED ORAL 2 TIMES DAILY
Qty: 14 TABLET | Refills: 0 | Status: SHIPPED | OUTPATIENT
Start: 2018-06-15 | End: 2018-06-22

## 2018-06-15 ASSESSMENT — ENCOUNTER SYMPTOMS
VOMITING: 0
ABDOMINAL PAIN: 0
DIARRHEA: 0

## 2018-06-15 ASSESSMENT — PAIN DESCRIPTION - PAIN TYPE
TYPE: ACUTE PAIN
TYPE: ACUTE PAIN

## 2018-06-15 ASSESSMENT — PAIN DESCRIPTION - ORIENTATION
ORIENTATION: RIGHT
ORIENTATION: RIGHT

## 2018-06-15 ASSESSMENT — PAIN SCALES - GENERAL
PAINLEVEL_OUTOF10: 7
PAINLEVEL_OUTOF10: 5

## 2018-06-15 ASSESSMENT — PAIN DESCRIPTION - PROGRESSION
CLINICAL_PROGRESSION: GRADUALLY IMPROVING
CLINICAL_PROGRESSION: GRADUALLY WORSENING

## 2018-06-15 ASSESSMENT — PAIN - FUNCTIONAL ASSESSMENT: PAIN_FUNCTIONAL_ASSESSMENT: 0-10

## 2018-06-15 ASSESSMENT — PAIN DESCRIPTION - FREQUENCY
FREQUENCY: INTERMITTENT
FREQUENCY: CONTINUOUS

## 2018-06-15 ASSESSMENT — PAIN DESCRIPTION - ONSET
ONSET: ON-GOING
ONSET: ON-GOING

## 2018-06-15 ASSESSMENT — PAIN DESCRIPTION - LOCATION
LOCATION: FLANK
LOCATION: FLANK

## 2018-06-15 ASSESSMENT — PAIN DESCRIPTION - DESCRIPTORS
DESCRIPTORS: SHARP
DESCRIPTORS: SHARP

## 2018-06-22 DIAGNOSIS — E11.9 TYPE 2 DIABETES MELLITUS WITHOUT COMPLICATION, WITHOUT LONG-TERM CURRENT USE OF INSULIN (HCC): Primary | ICD-10-CM

## 2018-06-22 RX ORDER — BLOOD-GLUCOSE METER
1 KIT MISCELLANEOUS DAILY
Qty: 1 KIT | Refills: 0 | Status: SHIPPED | OUTPATIENT
Start: 2018-06-22 | End: 2019-09-12 | Stop reason: CLARIF

## 2018-06-22 RX ORDER — LANCETS 30 GAUGE
EACH MISCELLANEOUS
Qty: 100 EACH | Refills: 1 | Status: SHIPPED | OUTPATIENT
Start: 2018-06-22 | End: 2020-01-27 | Stop reason: SDUPTHER

## 2018-06-22 RX ORDER — GLUCOSAMINE HCL/CHONDROITIN SU 500-400 MG
CAPSULE ORAL
Qty: 100 STRIP | Refills: 1 | Status: SHIPPED | OUTPATIENT
Start: 2018-06-22 | End: 2019-08-08 | Stop reason: SDUPTHER

## 2018-07-02 ENCOUNTER — PATIENT MESSAGE (OUTPATIENT)
Dept: FAMILY MEDICINE CLINIC | Age: 38
End: 2018-07-02

## 2018-07-02 DIAGNOSIS — E11.9 TYPE 2 DIABETES MELLITUS WITHOUT COMPLICATION, WITHOUT LONG-TERM CURRENT USE OF INSULIN (HCC): Primary | ICD-10-CM

## 2018-07-02 NOTE — TELEPHONE ENCOUNTER
From: Bhupinder Miller  To: Jb Beauchamp DO  Sent: 7/2/2018 11:22 AM EDT  Subject: Visit Follow-Up Question    Hi, I stopped taking my water pill as it was giving me really bad back pain and joint pain. I know that can mess with my sugar so I will try a small dose of whatever medication you wanted to try to lower my blood sugar and I'll still keep track for awhile.  Thx

## 2018-09-26 ENCOUNTER — OFFICE VISIT (OUTPATIENT)
Dept: PRIMARY CARE CLINIC | Age: 38
End: 2018-09-26
Payer: COMMERCIAL

## 2018-09-26 VITALS
HEIGHT: 63 IN | DIASTOLIC BLOOD PRESSURE: 70 MMHG | HEART RATE: 78 BPM | SYSTOLIC BLOOD PRESSURE: 132 MMHG | WEIGHT: 150 LBS | TEMPERATURE: 99 F | OXYGEN SATURATION: 98 % | BODY MASS INDEX: 26.58 KG/M2

## 2018-09-26 DIAGNOSIS — R05.9 COUGH: ICD-10-CM

## 2018-09-26 DIAGNOSIS — J20.9 ACUTE BRONCHITIS, UNSPECIFIED ORGANISM: ICD-10-CM

## 2018-09-26 DIAGNOSIS — J01.41 ACUTE RECURRENT PANSINUSITIS: Primary | ICD-10-CM

## 2018-09-26 PROCEDURE — G8417 CALC BMI ABV UP PARAM F/U: HCPCS | Performed by: PHYSICIAN ASSISTANT

## 2018-09-26 PROCEDURE — 99213 OFFICE O/P EST LOW 20 MIN: CPT | Performed by: PHYSICIAN ASSISTANT

## 2018-09-26 PROCEDURE — G8427 DOCREV CUR MEDS BY ELIG CLIN: HCPCS | Performed by: PHYSICIAN ASSISTANT

## 2018-09-26 PROCEDURE — 4004F PT TOBACCO SCREEN RCVD TLK: CPT | Performed by: PHYSICIAN ASSISTANT

## 2018-09-26 RX ORDER — BENZONATATE 200 MG/1
200 CAPSULE ORAL 3 TIMES DAILY PRN
Qty: 30 CAPSULE | Refills: 1 | Status: SHIPPED | OUTPATIENT
Start: 2018-09-26 | End: 2018-10-03

## 2018-09-26 RX ORDER — AZITHROMYCIN 250 MG/1
TABLET, FILM COATED ORAL
Qty: 1 PACKET | Refills: 1 | Status: SHIPPED | OUTPATIENT
Start: 2018-09-26 | End: 2018-09-30

## 2018-09-26 ASSESSMENT — ENCOUNTER SYMPTOMS
SINUS PRESSURE: 1
VOMITING: 0
RHINORRHEA: 1
WHEEZING: 1
DIARRHEA: 0
SHORTNESS OF BREATH: 1
NAUSEA: 1
SORE THROAT: 0
SINUS PAIN: 1
CHEST TIGHTNESS: 1
TROUBLE SWALLOWING: 0
COUGH: 1

## 2018-09-26 NOTE — PROGRESS NOTES
Subjective:      Patient ID: Lev Fermin is a 43 y.o. female. Patient is seen due to cold symptoms for the past 8 days. She was on her honeymoon in [place] and has worsened. Still has a lot of congestion and postnasal drip. Is very tired. Has a cough the cough is productive and green yellow. Feels like she has been running a fever. They did fly too. This hurt her ears. Her  is ill now. Review of Systems   Constitutional: Positive for appetite change, chills, fatigue and fever. HENT: Positive for postnasal drip, rhinorrhea, sinus pain and sinus pressure. Negative for sneezing, sore throat and trouble swallowing. Respiratory: Positive for cough, chest tightness, shortness of breath and wheezing. Has coughing spells. Cough keeps her awake. Had some tessalon and a few prednisone left over this helped but ran out of the medications. Cardiovascular: Positive for chest pain. Gastrointestinal: Positive for nausea. Negative for diarrhea and vomiting. Musculoskeletal: Positive for myalgias. Skin: Negative for rash. Neurological: Positive for dizziness, light-headedness and headaches. Psychiatric/Behavioral: Positive for sleep disturbance. Objective:   Physical Exam   Constitutional: She is oriented to person, place, and time. She appears well-developed and well-nourished. No distress. HENT:   Head: Normocephalic and atraumatic. Mouth/Throat: No oropharyngeal exudate. She had pain with palpation over all sinuses bilaterally. Postnasal drip noted. Nasal turbinates are boggy. Eyes: Conjunctivae are normal. No scleral icterus. Neck: Normal range of motion. Neck supple. Cardiovascular: Normal rate, regular rhythm and normal heart sounds. No murmur heard. Pulmonary/Chest: Effort normal and breath sounds normal. She has no wheezes. She has no rales. Chest is clear. Abdominal: Soft. She exhibits no distension and no mass. There is no tenderness.  There is

## 2018-09-28 ENCOUNTER — TELEPHONE (OUTPATIENT)
Dept: PRIMARY CARE CLINIC | Age: 38
End: 2018-09-28

## 2018-09-28 DIAGNOSIS — N76.0 ACUTE VAGINITIS: Primary | ICD-10-CM

## 2018-09-28 RX ORDER — FLUCONAZOLE 150 MG/1
TABLET ORAL
Qty: 3 TABLET | Refills: 0 | Status: SHIPPED | OUTPATIENT
Start: 2018-09-28 | End: 2018-10-24 | Stop reason: ALTCHOICE

## 2018-09-29 ENCOUNTER — OFFICE VISIT (OUTPATIENT)
Dept: PRIMARY CARE CLINIC | Age: 38
End: 2018-09-29
Payer: COMMERCIAL

## 2018-09-29 VITALS
BODY MASS INDEX: 26.9 KG/M2 | DIASTOLIC BLOOD PRESSURE: 94 MMHG | WEIGHT: 151.8 LBS | RESPIRATION RATE: 16 BRPM | TEMPERATURE: 99.5 F | OXYGEN SATURATION: 98 % | SYSTOLIC BLOOD PRESSURE: 136 MMHG | HEART RATE: 118 BPM | HEIGHT: 63 IN

## 2018-09-29 DIAGNOSIS — H53.8 BLURRED VISION: ICD-10-CM

## 2018-09-29 DIAGNOSIS — H53.2 DIPLOPIA: Primary | ICD-10-CM

## 2018-09-29 PROCEDURE — 99213 OFFICE O/P EST LOW 20 MIN: CPT | Performed by: FAMILY MEDICINE

## 2018-09-29 PROCEDURE — G8417 CALC BMI ABV UP PARAM F/U: HCPCS | Performed by: FAMILY MEDICINE

## 2018-09-29 PROCEDURE — 4004F PT TOBACCO SCREEN RCVD TLK: CPT | Performed by: FAMILY MEDICINE

## 2018-09-29 PROCEDURE — G8427 DOCREV CUR MEDS BY ELIG CLIN: HCPCS | Performed by: FAMILY MEDICINE

## 2018-09-29 ASSESSMENT — ENCOUNTER SYMPTOMS
EYE DISCHARGE: 0
SINUS PRESSURE: 1
EYE PAIN: 0
SHORTNESS OF BREATH: 1
SORE THROAT: 0
SINUS PAIN: 1

## 2018-09-29 ASSESSMENT — PATIENT HEALTH QUESTIONNAIRE - PHQ9
SUM OF ALL RESPONSES TO PHQ QUESTIONS 1-9: 0
SUM OF ALL RESPONSES TO PHQ9 QUESTIONS 1 & 2: 0
1. LITTLE INTEREST OR PLEASURE IN DOING THINGS: 0
2. FEELING DOWN, DEPRESSED OR HOPELESS: 0
SUM OF ALL RESPONSES TO PHQ QUESTIONS 1-9: 0

## 2018-09-29 NOTE — PROGRESS NOTES
Comment: social        Vitals:    09/29/18 1618 09/29/18 1620   BP: (!) 140/100 (!) 136/94   Pulse: 118    Resp: 16    Temp: 99.5 °F (37.5 °C)    SpO2: 98%    Weight: 151 lb 12.8 oz (68.9 kg)    Height: 5' 3\" (1.6 m)      Estimated body mass index is 26.89 kg/m² as calculated from the following:    Height as of this encounter: 5' 3\" (1.6 m). Weight as of this encounter: 151 lb 12.8 oz (68.9 kg). Physical Exam   Constitutional: She is oriented to person, place, and time. She appears well-developed and well-nourished. No distress. HENT:   Head: Normocephalic and atraumatic. Right Ear: External ear normal.   Left Ear: External ear normal.   Mouth/Throat: Oropharynx is clear and moist. No oropharyngeal exudate. Eyes: Conjunctivae and EOM are normal. No scleral icterus. Neck: Neck supple. No thyromegaly present. Cardiovascular: Normal rate, regular rhythm, normal heart sounds and intact distal pulses. No murmur heard. Pulmonary/Chest: Effort normal and breath sounds normal. No respiratory distress. She has no wheezes. Abdominal: Soft. Bowel sounds are normal. She exhibits no distension. There is no tenderness. There is no rebound. Musculoskeletal: Normal range of motion. She exhibits no edema or tenderness. Neurological: She is alert and oriented to person, place, and time. Skin: Skin is warm and dry. No rash noted. No erythema. Psychiatric: She has a normal mood and affect. Her behavior is normal. Judgment and thought content normal.   BP (!) 136/94   Pulse 118   Temp 99.5 °F (37.5 °C)   Resp 16   Ht 5' 3\" (1.6 m)   Wt 151 lb 12.8 oz (68.9 kg)   SpO2 98%   BMI 26.89 kg/m²       ASSESSMENT/PLAN:  Visual changes. Sense of blurry distant vision and intermittent diplopia. Source unclear. Recent sinus /bronchitis symptoms treated with zpak. Will complete the pack. Plan opthalmology consult re: acute vision changes. Work note needed.        An electronic signature was used to authenticate this note.     --Jj Alvarez MD on 9/29/2018 at 4:37 PM

## 2018-09-30 DIAGNOSIS — E11.9 TYPE 2 DIABETES MELLITUS WITHOUT COMPLICATION, WITHOUT LONG-TERM CURRENT USE OF INSULIN (HCC): ICD-10-CM

## 2018-09-30 DIAGNOSIS — E78.5 HYPERLIPIDEMIA, UNSPECIFIED HYPERLIPIDEMIA TYPE: Primary | ICD-10-CM

## 2018-10-11 ENCOUNTER — PATIENT MESSAGE (OUTPATIENT)
Dept: FAMILY MEDICINE CLINIC | Age: 38
End: 2018-10-11

## 2018-10-17 ENCOUNTER — PATIENT MESSAGE (OUTPATIENT)
Dept: FAMILY MEDICINE CLINIC | Age: 38
End: 2018-10-17

## 2018-10-17 DIAGNOSIS — R53.83 FATIGUE, UNSPECIFIED TYPE: Primary | ICD-10-CM

## 2018-10-20 ENCOUNTER — HOSPITAL ENCOUNTER (OUTPATIENT)
Dept: LAB | Age: 38
Discharge: HOME | End: 2018-10-20
Payer: COMMERCIAL

## 2018-10-20 DIAGNOSIS — E78.5 HYPERLIPIDEMIA, UNSPECIFIED HYPERLIPIDEMIA TYPE: ICD-10-CM

## 2018-10-20 DIAGNOSIS — R53.83 FATIGUE, UNSPECIFIED TYPE: ICD-10-CM

## 2018-10-20 DIAGNOSIS — E11.9 TYPE 2 DIABETES MELLITUS WITHOUT COMPLICATION, WITHOUT LONG-TERM CURRENT USE OF INSULIN (HCC): ICD-10-CM

## 2018-10-20 LAB
ABSOLUTE EOS #: 0.2 K/UL (ref 0–0.4)
ABSOLUTE IMMATURE GRANULOCYTE: NORMAL K/UL (ref 0–0.3)
ABSOLUTE LYMPH #: 2.5 K/UL (ref 1–4.8)
ABSOLUTE MONO #: 0.4 K/UL (ref 0.1–1.2)
ALBUMIN SERPL-MCNC: 4.7 G/DL (ref 3.5–5.2)
ALBUMIN/GLOBULIN RATIO: 1.5 (ref 1–2.5)
ALP BLD-CCNC: 61 U/L (ref 35–104)
ALT SERPL-CCNC: 67 U/L (ref 5–33)
ANION GAP SERPL CALCULATED.3IONS-SCNC: 12 MMOL/L (ref 9–17)
AST SERPL-CCNC: 71 U/L
BASOPHILS # BLD: 1 % (ref 0–1)
BASOPHILS ABSOLUTE: 0.1 K/UL (ref 0–0.2)
BILIRUB SERPL-MCNC: 0.4 MG/DL (ref 0.3–1.2)
BUN BLDV-MCNC: 9 MG/DL (ref 6–20)
BUN/CREAT BLD: 14 (ref 9–20)
CALCIUM SERPL-MCNC: 9.3 MG/DL (ref 8.6–10.4)
CHLORIDE BLD-SCNC: 102 MMOL/L (ref 98–107)
CHOLESTEROL/HDL RATIO: 5.6
CHOLESTEROL: 246 MG/DL
CO2: 23 MMOL/L (ref 20–31)
CREAT SERPL-MCNC: 0.63 MG/DL (ref 0.5–0.9)
DIFFERENTIAL TYPE: NORMAL
EOSINOPHILS RELATIVE PERCENT: 3 % (ref 1–7)
ESTIMATED AVERAGE GLUCOSE: 197 MG/DL
GFR AFRICAN AMERICAN: >60 ML/MIN
GFR NON-AFRICAN AMERICAN: >60 ML/MIN
GFR SERPL CREATININE-BSD FRML MDRD: ABNORMAL ML/MIN/{1.73_M2}
GFR SERPL CREATININE-BSD FRML MDRD: ABNORMAL ML/MIN/{1.73_M2}
GLUCOSE BLD-MCNC: 173 MG/DL (ref 70–99)
HBA1C MFR BLD: 8.5 % (ref 4.8–5.9)
HCT VFR BLD CALC: 44.1 % (ref 36–46)
HDLC SERPL-MCNC: 44 MG/DL
HEMOGLOBIN: 14.9 G/DL (ref 12–16)
IMMATURE GRANULOCYTES: NORMAL %
LDL CHOLESTEROL: 165 MG/DL (ref 0–130)
LYMPHOCYTES # BLD: 34 % (ref 16–46)
MCH RBC QN AUTO: 32.4 PG (ref 26–34)
MCHC RBC AUTO-ENTMCNC: 33.7 G/DL (ref 31–37)
MCV RBC AUTO: 96 FL (ref 80–100)
MONOCYTES # BLD: 6 % (ref 4–11)
NRBC AUTOMATED: NORMAL PER 100 WBC
PDW BLD-RTO: 12.4 % (ref 11–14.5)
PLATELET # BLD: 226 K/UL (ref 140–450)
PLATELET ESTIMATE: NORMAL
PMV BLD AUTO: 8.9 FL (ref 6–12)
POTASSIUM SERPL-SCNC: 4.1 MMOL/L (ref 3.7–5.3)
RBC # BLD: 4.6 M/UL (ref 4–5.2)
RBC # BLD: NORMAL 10*6/UL
SEG NEUTROPHILS: 56 % (ref 43–77)
SEGMENTED NEUTROPHILS ABSOLUTE COUNT: 4.4 K/UL (ref 1.8–7.7)
SODIUM BLD-SCNC: 137 MMOL/L (ref 135–144)
THYROXINE, FREE: 0.94 NG/DL (ref 0.93–1.7)
TOTAL PROTEIN: 7.9 G/DL (ref 6.4–8.3)
TRIGL SERPL-MCNC: 187 MG/DL
TSH SERPL DL<=0.05 MIU/L-ACNC: 1.45 MIU/L (ref 0.3–5)
VITAMIN D 25-HYDROXY: 55.9 NG/ML (ref 30–100)
VLDLC SERPL CALC-MCNC: ABNORMAL MG/DL (ref 1–30)
WBC # BLD: 7.6 K/UL (ref 3.5–11)
WBC # BLD: NORMAL 10*3/UL

## 2018-10-20 PROCEDURE — 84443 ASSAY THYROID STIM HORMONE: CPT

## 2018-10-20 PROCEDURE — 84439 ASSAY OF FREE THYROXINE: CPT

## 2018-10-20 PROCEDURE — 85025 COMPLETE CBC W/AUTO DIFF WBC: CPT

## 2018-10-20 PROCEDURE — 80061 LIPID PANEL: CPT

## 2018-10-20 PROCEDURE — 36415 COLL VENOUS BLD VENIPUNCTURE: CPT

## 2018-10-20 PROCEDURE — 80053 COMPREHEN METABOLIC PANEL: CPT

## 2018-10-20 PROCEDURE — 82306 VITAMIN D 25 HYDROXY: CPT

## 2018-10-20 PROCEDURE — 83036 HEMOGLOBIN GLYCOSYLATED A1C: CPT

## 2018-10-22 ENCOUNTER — HOSPITAL ENCOUNTER (EMERGENCY)
Age: 38
Discharge: HOME | End: 2018-10-22
Attending: EMERGENCY MEDICINE
Payer: COMMERCIAL

## 2018-10-22 VITALS
RESPIRATION RATE: 16 BRPM | OXYGEN SATURATION: 98 % | TEMPERATURE: 98.9 F | SYSTOLIC BLOOD PRESSURE: 157 MMHG | HEART RATE: 86 BPM | DIASTOLIC BLOOD PRESSURE: 100 MMHG | BODY MASS INDEX: 26.93 KG/M2 | WEIGHT: 152 LBS

## 2018-10-22 DIAGNOSIS — R51.9 ACUTE NONINTRACTABLE HEADACHE, UNSPECIFIED HEADACHE TYPE: Primary | ICD-10-CM

## 2018-10-22 PROCEDURE — 99283 EMERGENCY DEPT VISIT LOW MDM: CPT

## 2018-10-22 PROCEDURE — 96375 TX/PRO/DX INJ NEW DRUG ADDON: CPT

## 2018-10-22 PROCEDURE — 6360000002 HC RX W HCPCS: Performed by: EMERGENCY MEDICINE

## 2018-10-22 PROCEDURE — 2580000003 HC RX 258: Performed by: EMERGENCY MEDICINE

## 2018-10-22 PROCEDURE — 96374 THER/PROPH/DIAG INJ IV PUSH: CPT

## 2018-10-22 RX ORDER — KETOROLAC TROMETHAMINE 30 MG/ML
30 INJECTION, SOLUTION INTRAMUSCULAR; INTRAVENOUS ONCE
Status: COMPLETED | OUTPATIENT
Start: 2018-10-22 | End: 2018-10-22

## 2018-10-22 RX ORDER — DEXAMETHASONE SODIUM PHOSPHATE 10 MG/ML
10 INJECTION INTRAMUSCULAR; INTRAVENOUS ONCE
Status: COMPLETED | OUTPATIENT
Start: 2018-10-22 | End: 2018-10-22

## 2018-10-22 RX ORDER — 0.9 % SODIUM CHLORIDE 0.9 %
1000 INTRAVENOUS SOLUTION INTRAVENOUS ONCE
Status: COMPLETED | OUTPATIENT
Start: 2018-10-22 | End: 2018-10-22

## 2018-10-22 RX ORDER — PROMETHAZINE HYDROCHLORIDE 25 MG/ML
12.5 INJECTION, SOLUTION INTRAMUSCULAR; INTRAVENOUS ONCE
Status: COMPLETED | OUTPATIENT
Start: 2018-10-22 | End: 2018-10-22

## 2018-10-22 RX ORDER — DIPHENHYDRAMINE HYDROCHLORIDE 50 MG/ML
25 INJECTION INTRAMUSCULAR; INTRAVENOUS ONCE
Status: COMPLETED | OUTPATIENT
Start: 2018-10-22 | End: 2018-10-22

## 2018-10-22 RX ADMIN — PROMETHAZINE HYDROCHLORIDE 12.5 MG: 25 INJECTION INTRAMUSCULAR; INTRAVENOUS at 18:32

## 2018-10-22 RX ADMIN — KETOROLAC TROMETHAMINE 30 MG: 30 INJECTION, SOLUTION INTRAMUSCULAR at 18:32

## 2018-10-22 RX ADMIN — SODIUM CHLORIDE 1000 ML: 9 INJECTION, SOLUTION INTRAVENOUS at 18:31

## 2018-10-22 RX ADMIN — DIPHENHYDRAMINE HYDROCHLORIDE 25 MG: 50 INJECTION, SOLUTION INTRAMUSCULAR; INTRAVENOUS at 18:31

## 2018-10-22 RX ADMIN — DEXAMETHASONE SODIUM PHOSPHATE 10 MG: 10 INJECTION INTRAMUSCULAR; INTRAVENOUS at 18:32

## 2018-10-22 ASSESSMENT — PAIN DESCRIPTION - LOCATION: LOCATION: HEAD

## 2018-10-22 ASSESSMENT — PAIN DESCRIPTION - ORIENTATION: ORIENTATION: LOWER;POSTERIOR

## 2018-10-22 ASSESSMENT — PAIN SCALES - GENERAL
PAINLEVEL_OUTOF10: 10
PAINLEVEL_OUTOF10: 9

## 2018-10-22 NOTE — ED PROVIDER NOTES
[address] [facility] ED  [facility]  Phone: [phone]      Pt Name: Livan Barron  WOK:[id number]  Birthdate 1980  Date of evaluation: 10/22/2018      CHIEF COMPLAINT       Chief Complaint   Patient presents with    Headache       HISTORY OF PRESENT ILLNESS    39-year-old female with a self diagnosed history of migraine headache presents to the emergency department today complaining of a migraine headache. It resides primarily in her forehead as well as occipital scalp. She tells me that this feels identical to her headaches in the past however typically they go away after she falls asleep and wakes up however she tells me that this is injured throughout the entire weekend. She does report nausea without vomiting. No fevers or chills. No neck stiffness. She denies photophobia. She tells me that she has had headaches in the past like this in the past and she has some leftover steroids that seemed to help as well. She denies any other redness evaluation or management of these symptoms right arrival.     REVIEWOF SYSTEMS     Review of Systems   All other systems reviewed and are negative. PAST MEDICAL HISTORY    has a past medical history of Allergic rhinitis; GERD (gastroesophageal reflux disease); Hemorrhoids; Hyperlipidemia; Hypertension; Hypoglycemia; Kidney stones; Tobacco abuse; and UTI (urinary tract infection). SURGICAL HISTORY      has a past surgical history that includes Lithotripsy; Tubal ligation; Endometrial ablation; Carpal tunnel release (Left); Refractive surgery (Bilateral); other surgical history (Right, 09/07/2016); pr egd transoral biopsy single/multiple (N/A, 3/13/2017); Upper gastrointestinal endoscopy (2010); Upper gastrointestinal endoscopy (03/13/2017); and Nasal septum surgery.     [address]       Current Discharge Medication List      CONTINUE these medications which have NOT CHANGED    Details   metFORMIN (GLUCOPHAGE) 500 MG

## 2018-10-23 ENCOUNTER — PATIENT MESSAGE (OUTPATIENT)
Dept: FAMILY MEDICINE CLINIC | Age: 38
End: 2018-10-23

## 2018-10-24 ENCOUNTER — OFFICE VISIT (OUTPATIENT)
Dept: ALLERGY | Age: 38
End: 2018-10-24
Payer: COMMERCIAL

## 2018-10-24 ENCOUNTER — HOSPITAL ENCOUNTER (OUTPATIENT)
Age: 38
Setting detail: SPECIMEN
Discharge: HOME | End: 2018-10-24
Payer: COMMERCIAL

## 2018-10-24 VITALS
SYSTOLIC BLOOD PRESSURE: 136 MMHG | DIASTOLIC BLOOD PRESSURE: 88 MMHG | WEIGHT: 154 LBS | HEART RATE: 80 BPM | HEIGHT: 63 IN | BODY MASS INDEX: 27.29 KG/M2

## 2018-10-24 DIAGNOSIS — E11.9 TYPE 2 DIABETES MELLITUS WITHOUT COMPLICATION, WITHOUT LONG-TERM CURRENT USE OF INSULIN (HCC): ICD-10-CM

## 2018-10-24 DIAGNOSIS — K21.9 GASTROESOPHAGEAL REFLUX DISEASE, ESOPHAGITIS PRESENCE NOT SPECIFIED: ICD-10-CM

## 2018-10-24 DIAGNOSIS — K21.9 LPRD (LARYNGOPHARYNGEAL REFLUX DISEASE): ICD-10-CM

## 2018-10-24 DIAGNOSIS — J30.1 CHRONIC SEASONAL ALLERGIC RHINITIS DUE TO POLLEN: Primary | ICD-10-CM

## 2018-10-24 LAB
CREATININE URINE: 208.3 MG/DL (ref 28–217)
MICROALBUMIN/CREAT 24H UR: 31 MG/L
MICROALBUMIN/CREAT UR-RTO: 15 MCG/MG CREAT

## 2018-10-24 PROCEDURE — G8427 DOCREV CUR MEDS BY ELIG CLIN: HCPCS | Performed by: NURSE PRACTITIONER

## 2018-10-24 PROCEDURE — 99213 OFFICE O/P EST LOW 20 MIN: CPT | Performed by: NURSE PRACTITIONER

## 2018-10-24 PROCEDURE — 82570 ASSAY OF URINE CREATININE: CPT

## 2018-10-24 PROCEDURE — 82043 UR ALBUMIN QUANTITATIVE: CPT

## 2018-10-24 PROCEDURE — 4004F PT TOBACCO SCREEN RCVD TLK: CPT | Performed by: NURSE PRACTITIONER

## 2018-10-24 PROCEDURE — G8484 FLU IMMUNIZE NO ADMIN: HCPCS | Performed by: NURSE PRACTITIONER

## 2018-10-24 PROCEDURE — G8417 CALC BMI ABV UP PARAM F/U: HCPCS | Performed by: NURSE PRACTITIONER

## 2018-10-24 RX ORDER — ESOMEPRAZOLE MAGNESIUM 40 MG/1
CAPSULE, DELAYED RELEASE ORAL
Qty: 30 CAPSULE | Refills: 11 | Status: SHIPPED | OUTPATIENT
Start: 2018-10-24 | End: 2018-10-29 | Stop reason: SDUPTHER

## 2018-10-24 NOTE — PROGRESS NOTES
Subjective:      Patient ID: Lucien Levine is a 43 y.o. female. HPI Presents for follow up. She has been having headaches more frequently than usual over the past several months. She complains of pain in her forehead, under her eyes and of increased nasal congestion. She was treated for sinusitis and bronchitis with two courses of zithromax recently. She thinks she has another sinus infection. Review of medications reveals she ran out of Nexium and has been buying in over the counter for the past 4-6 months. She was previously well controlled on a 40 mg dose. She has been taking 20 mg since a couple months prior to the headaches increasing. She remains on flonase and ranitidine. She is doing sinus rinses with return of clear fluid. Review of Systems   See symptom questionnaires and history as above. All other systems reviewed and are negative as pertaining to this appointment. 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 Head normocephalic and atraumatic. Psych  Alert and oriented. Pleasant and cooperative. Chest  Rises and falls symmetrically with no evidence of respiratory distress. Lungs  0/40 - no wheeze or other adventitious breath sounds    Nose  Pallor: 2-Healthy/Pink    Bogginess: 2-turbinate occupies less than or equal to 50% of the diameter of naris    Wetness: 2-scant secretions    Redness: 2-healthy pink    Mouth  Pharynx clear, uvula midline, dentition WDL. Ears  Ear canals WDL, TM's nisreen grey with visible light reflex. Eyes  Pupils equal and reactive. EOM's WDL. Heart  Heart rate regular. Rhythm without murmur, click, rub or gallop. Skin  Warm, dry and intact. Neck  Supple. ROM WDL for age. No lymphadenopathy or thyromegaly. Musculoskeletal  ROM WDL for stated age. Extremities without clubbing, cyanosis or edema.

## 2018-10-24 NOTE — PROGRESS NOTES
Symptoms experienced by patient  Runny nose  No Circles under eyes No Post nasal drip No Difficulty breathing No   Stuffy nose Yes Grumpiness No Hoarseness Yes Short of breath No   Sniffling  No Fatigue Yes Face pain/pressure Yes Tight/heavy chest No   Sneezing No Nosebleeds Yes Sore throat No cough No   Blowing nose No Nasal/sinus surgery Yes Headache  Yes Cough up mucus Yes   Itchy/teary eyes No Nasal polyps No Upper teeth hurt No Cough of blood No   Itchy ears Yes Hearing loss No Night cough No Chest pain No   Itchy nose No Ear problems Yes Throat clearing Yes Asthma No   Itchy throat No Tubes in ears No Bad breath No Wheezing No   Itchy roof of mouth No Snoring Yes Bad taste in mouth No Pneumonia No   Nose rubbing No Mouth breathing Yes  Ankle swelling No    Overbite No  Difficulty breathing on exertion No    Drooling (toddler) No             Does patient experience? Heartburn and indigestion Yes  Vomiting easily No  Use of antacids (Rolaids, Tums, Maalox) Yes  Regurgitation of stomach contents Yes  Chronic cough worse after meals No  Chronic cough cough worse after laying down No  Chronic hoarseness or voice change Yes  Chest pain No  Stomach pain Yes  Neck pain Yes  Sore throat-frequent No  Feeling of throat closing or something stuck in throat No  Frequent burps or hiccups No  Adult onset asthma No  Asthma not relieved with usual treatment No  Anemia No  Asthma worse after meals, alcohol, lying down, bending to tie shoes, or after heartburn No  Chronic sinus disease No    Within the last month, how did the following problems affect the patient?   0=No Problem; 5=Severe Problem    Hoarseness or a problem with your voice 4  Clearing your throat 5  Excess throat mucus or postnasal drip 4  Difficulty swallowing food, liquids, pills 0  Coughing after you ate or after lying down 0  Breathing difficulties or choking episodes 0  Troublesome or annoying cough 0  Sensations of something sticking in your throat or a lump in your throat 0  Heartburn, chest pain, indigestion, or stomach acid coming up 3    Total: 16

## 2018-10-29 ENCOUNTER — TELEPHONE (OUTPATIENT)
Dept: ALLERGY | Age: 38
End: 2018-10-29

## 2018-10-29 ENCOUNTER — PATIENT MESSAGE (OUTPATIENT)
Dept: FAMILY MEDICINE CLINIC | Age: 38
End: 2018-10-29

## 2018-10-29 DIAGNOSIS — R14.0 ABDOMINAL BLOATING: Primary | ICD-10-CM

## 2018-10-29 DIAGNOSIS — K21.9 GASTROESOPHAGEAL REFLUX DISEASE, ESOPHAGITIS PRESENCE NOT SPECIFIED: ICD-10-CM

## 2018-10-29 DIAGNOSIS — K21.9 LPRD (LARYNGOPHARYNGEAL REFLUX DISEASE): ICD-10-CM

## 2018-10-29 RX ORDER — VITAMIN A 3000 MCG
CAPSULE ORAL
Qty: 30 CAPSULE | Refills: 11 | Status: SHIPPED | OUTPATIENT
Start: 2018-10-29 | End: 2019-11-04 | Stop reason: SDUPTHER

## 2018-10-29 RX ORDER — ZINC OXIDE 13 %
1 CREAM (GRAM) TOPICAL DAILY
Qty: 30 CAPSULE | Refills: 3 | Status: CANCELLED | OUTPATIENT
Start: 2018-10-29

## 2018-10-29 NOTE — TELEPHONE ENCOUNTER
Patient called stating Noe Rojas was going to prescribe Nexium. She states the nurse had a form to fax into the insurance company. She is wondering if the nurse faxed that form in.   You may leave a message at [phone]

## 2018-11-13 ENCOUNTER — OFFICE VISIT (OUTPATIENT)
Dept: FAMILY MEDICINE CLINIC | Age: 38
End: 2018-11-13
Payer: COMMERCIAL

## 2018-11-13 VITALS
SYSTOLIC BLOOD PRESSURE: 130 MMHG | HEART RATE: 90 BPM | HEIGHT: 63 IN | WEIGHT: 155 LBS | BODY MASS INDEX: 27.46 KG/M2 | OXYGEN SATURATION: 98 % | DIASTOLIC BLOOD PRESSURE: 88 MMHG

## 2018-11-13 DIAGNOSIS — E11.9 TYPE 2 DIABETES MELLITUS WITHOUT COMPLICATION, WITHOUT LONG-TERM CURRENT USE OF INSULIN (HCC): Primary | ICD-10-CM

## 2018-11-13 DIAGNOSIS — B37.31 CANDIDAL VAGINITIS: ICD-10-CM

## 2018-11-13 DIAGNOSIS — Z72.0 TOBACCO ABUSE: ICD-10-CM

## 2018-11-13 DIAGNOSIS — J32.9 CHRONIC CONGESTION OF PARANASAL SINUS: ICD-10-CM

## 2018-11-13 DIAGNOSIS — K21.9 GASTROESOPHAGEAL REFLUX DISEASE, ESOPHAGITIS PRESENCE NOT SPECIFIED: ICD-10-CM

## 2018-11-13 PROCEDURE — 4004F PT TOBACCO SCREEN RCVD TLK: CPT | Performed by: FAMILY MEDICINE

## 2018-11-13 PROCEDURE — G8427 DOCREV CUR MEDS BY ELIG CLIN: HCPCS | Performed by: FAMILY MEDICINE

## 2018-11-13 PROCEDURE — G8484 FLU IMMUNIZE NO ADMIN: HCPCS | Performed by: FAMILY MEDICINE

## 2018-11-13 PROCEDURE — 2022F DILAT RTA XM EVC RTNOPTHY: CPT | Performed by: FAMILY MEDICINE

## 2018-11-13 PROCEDURE — 3045F PR MOST RECENT HEMOGLOBIN A1C LEVEL 7.0-9.0%: CPT | Performed by: FAMILY MEDICINE

## 2018-11-13 PROCEDURE — G8417 CALC BMI ABV UP PARAM F/U: HCPCS | Performed by: FAMILY MEDICINE

## 2018-11-13 PROCEDURE — 99214 OFFICE O/P EST MOD 30 MIN: CPT | Performed by: FAMILY MEDICINE

## 2018-11-13 RX ORDER — PREDNISONE 20 MG/1
40 TABLET ORAL DAILY
Qty: 12 TABLET | Refills: 0 | Status: SHIPPED | OUTPATIENT
Start: 2018-11-13 | End: 2018-11-19

## 2018-11-13 RX ORDER — FLUCONAZOLE 150 MG/1
TABLET ORAL
Qty: 2 TABLET | Refills: 0 | Status: SHIPPED | OUTPATIENT
Start: 2018-11-13 | End: 2018-12-29 | Stop reason: SDUPTHER

## 2018-11-14 ASSESSMENT — ENCOUNTER SYMPTOMS: SINUS PRESSURE: 1

## 2018-11-24 ENCOUNTER — PATIENT MESSAGE (OUTPATIENT)
Dept: FAMILY MEDICINE CLINIC | Age: 38
End: 2018-11-24

## 2018-11-26 DIAGNOSIS — E11.9 TYPE 2 DIABETES MELLITUS WITHOUT COMPLICATION, WITHOUT LONG-TERM CURRENT USE OF INSULIN (HCC): ICD-10-CM

## 2018-11-29 ENCOUNTER — PATIENT MESSAGE (OUTPATIENT)
Dept: FAMILY MEDICINE CLINIC | Age: 38
End: 2018-11-29

## 2018-11-29 DIAGNOSIS — R42 DIZZINESS: ICD-10-CM

## 2018-11-29 DIAGNOSIS — H69.83 EUSTACHIAN TUBE DYSFUNCTION, BILATERAL: ICD-10-CM

## 2018-11-29 DIAGNOSIS — R09.89 CHRONIC SINUS COMPLAINTS: Primary | ICD-10-CM

## 2018-11-29 RX ORDER — FEXOFENADINE HCL 180 MG/1
180 TABLET ORAL DAILY
Qty: 30 TABLET | Refills: 11 | Status: SHIPPED | OUTPATIENT
Start: 2018-11-29 | End: 2019-12-11

## 2018-11-30 ENCOUNTER — TELEPHONE (OUTPATIENT)
Dept: FAMILY MEDICINE CLINIC | Age: 38
End: 2018-11-30

## 2018-11-30 DIAGNOSIS — R42 DIZZINESS: ICD-10-CM

## 2018-11-30 DIAGNOSIS — H69.83 EUSTACHIAN TUBE DYSFUNCTION, BILATERAL: ICD-10-CM

## 2018-11-30 DIAGNOSIS — R09.89 CHRONIC SINUS COMPLAINTS: Primary | ICD-10-CM

## 2018-11-30 NOTE — TELEPHONE ENCOUNTER
Left message on Emily's voicemail that Dr Lee Ann Roldan has left [facility] & we will need to refer to another ENT. Next closest one would be Melani Oquendo.

## 2018-12-27 ENCOUNTER — PATIENT MESSAGE (OUTPATIENT)
Dept: FAMILY MEDICINE CLINIC | Age: 38
End: 2018-12-27

## 2018-12-29 ENCOUNTER — PATIENT MESSAGE (OUTPATIENT)
Dept: FAMILY MEDICINE CLINIC | Age: 38
End: 2018-12-29

## 2018-12-29 DIAGNOSIS — E11.9 TYPE 2 DIABETES MELLITUS WITHOUT COMPLICATION, WITHOUT LONG-TERM CURRENT USE OF INSULIN (HCC): Primary | ICD-10-CM

## 2018-12-29 RX ORDER — FLUCONAZOLE 150 MG/1
TABLET ORAL
Qty: 2 TABLET | Refills: 1 | Status: SHIPPED | OUTPATIENT
Start: 2018-12-29 | End: 2019-02-26

## 2019-01-10 ENCOUNTER — TELEPHONE (OUTPATIENT)
Dept: FAMILY MEDICINE CLINIC | Age: 39
End: 2019-01-10

## 2019-01-10 RX ORDER — ALOGLIPTIN 25 MG/1
25 TABLET, FILM COATED ORAL DAILY
Qty: 30 TABLET | Refills: 1 | Status: SHIPPED | OUTPATIENT
Start: 2019-01-10 | End: 2019-02-26 | Stop reason: ALTCHOICE

## 2019-02-07 ENCOUNTER — OFFICE VISIT (OUTPATIENT)
Dept: OTOLARYNGOLOGY | Age: 39
End: 2019-02-07
Payer: COMMERCIAL

## 2019-02-07 ENCOUNTER — TELEPHONE (OUTPATIENT)
Dept: FAMILY MEDICINE CLINIC | Age: 39
End: 2019-02-07

## 2019-02-07 VITALS
WEIGHT: 155 LBS | SYSTOLIC BLOOD PRESSURE: 140 MMHG | HEIGHT: 63 IN | RESPIRATION RATE: 14 BRPM | DIASTOLIC BLOOD PRESSURE: 100 MMHG | HEART RATE: 82 BPM | BODY MASS INDEX: 27.46 KG/M2

## 2019-02-07 DIAGNOSIS — J32.9 SINUSITIS, UNSPECIFIED CHRONICITY, UNSPECIFIED LOCATION: ICD-10-CM

## 2019-02-07 DIAGNOSIS — H69.82 ETD (EUSTACHIAN TUBE DYSFUNCTION), LEFT: Primary | ICD-10-CM

## 2019-02-07 DIAGNOSIS — J31.0 RHINITIS, UNSPECIFIED TYPE: ICD-10-CM

## 2019-02-07 PROCEDURE — 31231 NASAL ENDOSCOPY DX: CPT | Performed by: OTOLARYNGOLOGY

## 2019-02-07 RX ORDER — AZELASTINE 1 MG/ML
1 SPRAY, METERED NASAL 2 TIMES DAILY
Qty: 1 BOTTLE | Refills: 3 | Status: SHIPPED | OUTPATIENT
Start: 2019-02-07 | End: 2019-07-29 | Stop reason: ALTCHOICE

## 2019-02-19 ENCOUNTER — OFFICE VISIT (OUTPATIENT)
Dept: FAMILY MEDICINE CLINIC | Age: 39
End: 2019-02-19
Payer: COMMERCIAL

## 2019-02-19 VITALS
WEIGHT: 155 LBS | HEIGHT: 63 IN | DIASTOLIC BLOOD PRESSURE: 84 MMHG | HEART RATE: 86 BPM | BODY MASS INDEX: 27.46 KG/M2 | SYSTOLIC BLOOD PRESSURE: 132 MMHG

## 2019-02-19 DIAGNOSIS — R09.89 CHRONIC SINUS COMPLAINTS: ICD-10-CM

## 2019-02-19 DIAGNOSIS — Z72.0 TOBACCO USE: ICD-10-CM

## 2019-02-19 DIAGNOSIS — K21.9 LPRD (LARYNGOPHARYNGEAL REFLUX DISEASE): Primary | ICD-10-CM

## 2019-02-19 DIAGNOSIS — J30.1 SEASONAL ALLERGIC RHINITIS DUE TO POLLEN: ICD-10-CM

## 2019-02-19 DIAGNOSIS — K21.9 GASTROESOPHAGEAL REFLUX DISEASE, ESOPHAGITIS PRESENCE NOT SPECIFIED: ICD-10-CM

## 2019-02-19 PROCEDURE — 99213 OFFICE O/P EST LOW 20 MIN: CPT | Performed by: NURSE PRACTITIONER

## 2019-02-19 PROCEDURE — G8417 CALC BMI ABV UP PARAM F/U: HCPCS | Performed by: NURSE PRACTITIONER

## 2019-02-19 PROCEDURE — 4004F PT TOBACCO SCREEN RCVD TLK: CPT | Performed by: NURSE PRACTITIONER

## 2019-02-19 PROCEDURE — G8427 DOCREV CUR MEDS BY ELIG CLIN: HCPCS | Performed by: NURSE PRACTITIONER

## 2019-02-19 PROCEDURE — G8484 FLU IMMUNIZE NO ADMIN: HCPCS | Performed by: NURSE PRACTITIONER

## 2019-02-19 RX ORDER — NORETHINDRONE ACETATE AND ETHINYL ESTRADIOL AND FERROUS FUMARATE 1MG-20(24)
1 KIT ORAL DAILY
COMMUNITY
End: 2019-05-29 | Stop reason: ALTCHOICE

## 2019-02-19 ASSESSMENT — PATIENT HEALTH QUESTIONNAIRE - PHQ9
SUM OF ALL RESPONSES TO PHQ QUESTIONS 1-9: 0
SUM OF ALL RESPONSES TO PHQ9 QUESTIONS 1 & 2: 0
2. FEELING DOWN, DEPRESSED OR HOPELESS: 0
1. LITTLE INTEREST OR PLEASURE IN DOING THINGS: 0
SUM OF ALL RESPONSES TO PHQ QUESTIONS 1-9: 0

## 2019-02-23 ENCOUNTER — HOSPITAL ENCOUNTER (OUTPATIENT)
Dept: LAB | Age: 39
Discharge: HOME | End: 2019-02-23
Payer: COMMERCIAL

## 2019-02-23 DIAGNOSIS — E11.9 TYPE 2 DIABETES MELLITUS WITHOUT COMPLICATION, WITHOUT LONG-TERM CURRENT USE OF INSULIN (HCC): ICD-10-CM

## 2019-02-23 LAB
ESTIMATED AVERAGE GLUCOSE: 220 MG/DL
HBA1C MFR BLD: 9.3 % (ref 4.8–5.9)

## 2019-02-23 PROCEDURE — 36415 COLL VENOUS BLD VENIPUNCTURE: CPT

## 2019-02-23 PROCEDURE — 83036 HEMOGLOBIN GLYCOSYLATED A1C: CPT

## 2019-02-26 ENCOUNTER — OFFICE VISIT (OUTPATIENT)
Dept: FAMILY MEDICINE CLINIC | Age: 39
End: 2019-02-26
Payer: COMMERCIAL

## 2019-02-26 VITALS
DIASTOLIC BLOOD PRESSURE: 88 MMHG | WEIGHT: 157.6 LBS | HEART RATE: 94 BPM | OXYGEN SATURATION: 98 % | BODY MASS INDEX: 27.92 KG/M2 | SYSTOLIC BLOOD PRESSURE: 130 MMHG

## 2019-02-26 DIAGNOSIS — E78.5 HYPERLIPIDEMIA, UNSPECIFIED HYPERLIPIDEMIA TYPE: ICD-10-CM

## 2019-02-26 DIAGNOSIS — F41.9 ANXIETY: ICD-10-CM

## 2019-02-26 DIAGNOSIS — E11.9 TYPE 2 DIABETES MELLITUS WITHOUT COMPLICATION, WITHOUT LONG-TERM CURRENT USE OF INSULIN (HCC): Primary | ICD-10-CM

## 2019-02-26 DIAGNOSIS — K21.9 GASTROESOPHAGEAL REFLUX DISEASE, ESOPHAGITIS PRESENCE NOT SPECIFIED: ICD-10-CM

## 2019-02-26 DIAGNOSIS — Z72.0 TOBACCO ABUSE: ICD-10-CM

## 2019-02-26 DIAGNOSIS — B37.31 RECURRENT CANDIDIASIS OF VAGINA: ICD-10-CM

## 2019-02-26 PROCEDURE — G8417 CALC BMI ABV UP PARAM F/U: HCPCS | Performed by: FAMILY MEDICINE

## 2019-02-26 PROCEDURE — 2022F DILAT RTA XM EVC RTNOPTHY: CPT | Performed by: FAMILY MEDICINE

## 2019-02-26 PROCEDURE — 3046F HEMOGLOBIN A1C LEVEL >9.0%: CPT | Performed by: FAMILY MEDICINE

## 2019-02-26 PROCEDURE — 99214 OFFICE O/P EST MOD 30 MIN: CPT | Performed by: FAMILY MEDICINE

## 2019-02-26 PROCEDURE — G8484 FLU IMMUNIZE NO ADMIN: HCPCS | Performed by: FAMILY MEDICINE

## 2019-02-26 PROCEDURE — G8427 DOCREV CUR MEDS BY ELIG CLIN: HCPCS | Performed by: FAMILY MEDICINE

## 2019-02-26 PROCEDURE — 4004F PT TOBACCO SCREEN RCVD TLK: CPT | Performed by: FAMILY MEDICINE

## 2019-02-26 RX ORDER — LORAZEPAM 0.5 MG/1
0.5 TABLET ORAL EVERY 8 HOURS PRN
Qty: 20 TABLET | Refills: 0 | Status: SHIPPED | OUTPATIENT
Start: 2019-02-26 | End: 2019-03-05

## 2019-02-26 RX ORDER — FLUCONAZOLE 150 MG/1
TABLET ORAL
Qty: 2 TABLET | Refills: 3 | Status: SHIPPED | OUTPATIENT
Start: 2019-02-26 | End: 2019-04-22 | Stop reason: ALTCHOICE

## 2019-02-26 RX ORDER — GLIMEPIRIDE 1 MG/1
1 TABLET ORAL EVERY MORNING
Qty: 30 TABLET | Refills: 2 | Status: SHIPPED | OUTPATIENT
Start: 2019-02-26 | End: 2019-05-24 | Stop reason: SDUPTHER

## 2019-02-26 ASSESSMENT — ENCOUNTER SYMPTOMS
BLOOD IN STOOL: 0
SHORTNESS OF BREATH: 0

## 2019-03-07 ENCOUNTER — PATIENT MESSAGE (OUTPATIENT)
Dept: FAMILY MEDICINE CLINIC | Age: 39
End: 2019-03-07

## 2019-03-07 NOTE — TELEPHONE ENCOUNTER
From: Braynt Brewer  To: Cain Hadley DO  Sent: 3/7/2019 3:21 PM EST  Subject: Prescription Question    So is it possible for my body to have gotten used to the higher sugar because the last couple days I've taken it before lunch it's been 120 and 112 and I've been crashing today especially my hands were shaking and I was sweating for over an hour and that was after I had some cheese and little turkey and ham bitsI was thinking about taking half in the morning and maybe half at lunch because when I wake up my sugar has still been about 150 to 180 what's your thoughts

## 2019-03-11 RX ORDER — ALOGLIPTIN 25 MG/1
TABLET, FILM COATED ORAL
Qty: 30 TABLET | Refills: 1 | OUTPATIENT
Start: 2019-03-11

## 2019-03-18 ENCOUNTER — OFFICE VISIT (OUTPATIENT)
Dept: PRIMARY CARE CLINIC | Age: 39
End: 2019-03-18
Payer: COMMERCIAL

## 2019-03-18 VITALS
BODY MASS INDEX: 27.81 KG/M2 | HEART RATE: 96 BPM | WEIGHT: 157 LBS | SYSTOLIC BLOOD PRESSURE: 130 MMHG | TEMPERATURE: 99.3 F | DIASTOLIC BLOOD PRESSURE: 88 MMHG | OXYGEN SATURATION: 99 %

## 2019-03-18 DIAGNOSIS — R09.82 PND (POST-NASAL DRIP): ICD-10-CM

## 2019-03-18 DIAGNOSIS — B96.89 ACUTE BACTERIAL SINUSITIS: Primary | ICD-10-CM

## 2019-03-18 DIAGNOSIS — J01.90 ACUTE BACTERIAL SINUSITIS: Primary | ICD-10-CM

## 2019-03-18 DIAGNOSIS — R06.02 SOB (SHORTNESS OF BREATH): ICD-10-CM

## 2019-03-18 PROCEDURE — G8417 CALC BMI ABV UP PARAM F/U: HCPCS | Performed by: NURSE PRACTITIONER

## 2019-03-18 PROCEDURE — 4004F PT TOBACCO SCREEN RCVD TLK: CPT | Performed by: NURSE PRACTITIONER

## 2019-03-18 PROCEDURE — G8484 FLU IMMUNIZE NO ADMIN: HCPCS | Performed by: NURSE PRACTITIONER

## 2019-03-18 PROCEDURE — G8427 DOCREV CUR MEDS BY ELIG CLIN: HCPCS | Performed by: NURSE PRACTITIONER

## 2019-03-18 PROCEDURE — 99213 OFFICE O/P EST LOW 20 MIN: CPT | Performed by: NURSE PRACTITIONER

## 2019-03-18 RX ORDER — AZITHROMYCIN 250 MG/1
TABLET, FILM COATED ORAL
Qty: 1 PACKET | Refills: 0 | Status: SHIPPED | OUTPATIENT
Start: 2019-03-18 | End: 2019-03-26 | Stop reason: ALTCHOICE

## 2019-03-18 RX ORDER — ALBUTEROL SULFATE 90 UG/1
2 AEROSOL, METERED RESPIRATORY (INHALATION) 4 TIMES DAILY PRN
Qty: 1 INHALER | Refills: 0 | Status: SHIPPED | OUTPATIENT
Start: 2019-03-18 | End: 2022-03-04

## 2019-03-18 RX ORDER — METHYLPREDNISOLONE 4 MG/1
TABLET ORAL
Qty: 1 KIT | Refills: 0 | Status: SHIPPED | OUTPATIENT
Start: 2019-03-18 | End: 2019-03-26 | Stop reason: ALTCHOICE

## 2019-03-18 ASSESSMENT — ENCOUNTER SYMPTOMS
SINUS PRESSURE: 1
COLOR CHANGE: 0
SINUS PAIN: 1
CHEST TIGHTNESS: 1
VOICE CHANGE: 1
WHEEZING: 1
VOMITING: 0
TROUBLE SWALLOWING: 0
NAUSEA: 0
COUGH: 1
SHORTNESS OF BREATH: 0
SORE THROAT: 1
SINUS COMPLAINT: 1
ABDOMINAL PAIN: 0
RHINORRHEA: 1

## 2019-03-24 ENCOUNTER — APPOINTMENT (OUTPATIENT)
Dept: CT IMAGING | Age: 39
End: 2019-03-24
Payer: COMMERCIAL

## 2019-03-24 ENCOUNTER — HOSPITAL ENCOUNTER (EMERGENCY)
Age: 39
Discharge: HOME | End: 2019-03-24
Attending: SPECIALIST
Payer: COMMERCIAL

## 2019-03-24 VITALS
WEIGHT: 145 LBS | OXYGEN SATURATION: 95 % | SYSTOLIC BLOOD PRESSURE: 166 MMHG | TEMPERATURE: 98.6 F | DIASTOLIC BLOOD PRESSURE: 106 MMHG | RESPIRATION RATE: 14 BRPM | BODY MASS INDEX: 25.69 KG/M2 | HEART RATE: 90 BPM

## 2019-03-24 DIAGNOSIS — R51.9 NONINTRACTABLE HEADACHE, UNSPECIFIED CHRONICITY PATTERN, UNSPECIFIED HEADACHE TYPE: Primary | ICD-10-CM

## 2019-03-24 PROCEDURE — 6360000002 HC RX W HCPCS: Performed by: SPECIALIST

## 2019-03-24 PROCEDURE — 2580000003 HC RX 258: Performed by: SPECIALIST

## 2019-03-24 PROCEDURE — 96374 THER/PROPH/DIAG INJ IV PUSH: CPT

## 2019-03-24 PROCEDURE — 99284 EMERGENCY DEPT VISIT MOD MDM: CPT

## 2019-03-24 PROCEDURE — 96375 TX/PRO/DX INJ NEW DRUG ADDON: CPT

## 2019-03-24 PROCEDURE — 70450 CT HEAD/BRAIN W/O DYE: CPT

## 2019-03-24 RX ORDER — DIPHENHYDRAMINE HYDROCHLORIDE 50 MG/ML
25 INJECTION INTRAMUSCULAR; INTRAVENOUS ONCE
Status: COMPLETED | OUTPATIENT
Start: 2019-03-24 | End: 2019-03-24

## 2019-03-24 RX ORDER — SODIUM CHLORIDE 9 MG/ML
INJECTION, SOLUTION INTRAVENOUS CONTINUOUS
Status: DISCONTINUED | OUTPATIENT
Start: 2019-03-24 | End: 2019-03-24 | Stop reason: HOSPADM

## 2019-03-24 RX ORDER — KETOROLAC TROMETHAMINE 30 MG/ML
30 INJECTION, SOLUTION INTRAMUSCULAR; INTRAVENOUS ONCE
Status: COMPLETED | OUTPATIENT
Start: 2019-03-24 | End: 2019-03-24

## 2019-03-24 RX ORDER — PROMETHAZINE HYDROCHLORIDE 25 MG/ML
12.5 INJECTION, SOLUTION INTRAMUSCULAR; INTRAVENOUS ONCE
Status: COMPLETED | OUTPATIENT
Start: 2019-03-24 | End: 2019-03-24

## 2019-03-24 RX ORDER — 0.9 % SODIUM CHLORIDE 0.9 %
1000 INTRAVENOUS SOLUTION INTRAVENOUS ONCE
Status: COMPLETED | OUTPATIENT
Start: 2019-03-24 | End: 2019-03-24

## 2019-03-24 RX ADMIN — DIPHENHYDRAMINE HYDROCHLORIDE 25 MG: 50 INJECTION, SOLUTION INTRAMUSCULAR; INTRAVENOUS at 15:29

## 2019-03-24 RX ADMIN — SODIUM CHLORIDE 1000 ML: 9 INJECTION, SOLUTION INTRAVENOUS at 15:29

## 2019-03-24 RX ADMIN — KETOROLAC TROMETHAMINE 30 MG: 30 INJECTION, SOLUTION INTRAMUSCULAR; INTRAVENOUS at 15:29

## 2019-03-24 RX ADMIN — PROMETHAZINE HYDROCHLORIDE 12.5 MG: 25 INJECTION INTRAMUSCULAR; INTRAVENOUS at 15:29

## 2019-03-24 ASSESSMENT — ENCOUNTER SYMPTOMS
ABDOMINAL PAIN: 0
SHORTNESS OF BREATH: 0
PHOTOPHOBIA: 1
VOMITING: 0
COUGH: 0

## 2019-03-24 ASSESSMENT — PAIN DESCRIPTION - DESCRIPTORS: DESCRIPTORS: THROBBING;SHARP

## 2019-03-24 ASSESSMENT — PAIN SCALES - GENERAL
PAINLEVEL_OUTOF10: 8
PAINLEVEL_OUTOF10: 8
PAINLEVEL_OUTOF10: 5

## 2019-03-24 ASSESSMENT — PAIN DESCRIPTION - LOCATION: LOCATION: HEAD;EAR;FACE

## 2019-03-24 ASSESSMENT — PAIN DESCRIPTION - ORIENTATION: ORIENTATION: LEFT

## 2019-03-26 ENCOUNTER — INITIAL CONSULT (OUTPATIENT)
Dept: SURGERY | Age: 39
End: 2019-03-26
Payer: COMMERCIAL

## 2019-03-26 VITALS
WEIGHT: 154 LBS | DIASTOLIC BLOOD PRESSURE: 80 MMHG | BODY MASS INDEX: 27.29 KG/M2 | HEIGHT: 63 IN | TEMPERATURE: 99.9 F | SYSTOLIC BLOOD PRESSURE: 138 MMHG | HEART RATE: 92 BPM

## 2019-03-26 DIAGNOSIS — K21.00 GASTROESOPHAGEAL REFLUX DISEASE WITH ESOPHAGITIS: Primary | ICD-10-CM

## 2019-03-26 PROCEDURE — 4004F PT TOBACCO SCREEN RCVD TLK: CPT | Performed by: SURGERY

## 2019-03-26 PROCEDURE — G8427 DOCREV CUR MEDS BY ELIG CLIN: HCPCS | Performed by: SURGERY

## 2019-03-26 PROCEDURE — G8484 FLU IMMUNIZE NO ADMIN: HCPCS | Performed by: SURGERY

## 2019-03-26 PROCEDURE — G8417 CALC BMI ABV UP PARAM F/U: HCPCS | Performed by: SURGERY

## 2019-03-26 PROCEDURE — 99213 OFFICE O/P EST LOW 20 MIN: CPT | Performed by: SURGERY

## 2019-03-26 NOTE — PATIENT INSTRUCTIONS
EGD with 48 hour BRAVO scheduled for 3/28/19. Patient states she has not taken Nexium or Ranitidine for at least 5 days. Instructed to not take Nexium or Ranitidine until after testing is done.

## 2019-03-26 NOTE — PROGRESS NOTES
General Surgery History & Physical  Awilda Stevenson MD  Pt Name: Prakash Sahu  MRN: [id number]  YOB: 1980  Date of evaluation: 3/26/2019  Primary Care Physician: Eli Zaman DO    Patient evaluated at the request of Kaushik Ashford NP  Reason for evaluation: GERD       SUBJECTIVE:  Chief Complaint:   Chief Complaint   Patient presents with    Gastroesophageal Reflux     History of Present Illness:  EGD         Nausea: yes, has dizziness  Vomiting: no  Heartburn:yes, once a week  Dysphagia:no  Hematemesis:yes, nasal and coughs up blood  Epigastric pain:yes, usually after eating  Anemia: no  Previous work up date:March132017  Current Treatment:Nexium 40 mg daily and Zantac 300 mg at hs over 3 years      Past Medical History   has a past medical history of Allergic rhinitis, GERD (gastroesophageal reflux disease), Hemorrhoids, Hyperlipidemia, Hypertension, Hypoglycemia, Kidney stones, Tobacco abuse, and UTI (urinary tract infection). Past Surgical History   has a past surgical history that includes Lithotripsy; Tubal ligation; Endometrial ablation; Carpal tunnel release (Left); Refractive surgery (Bilateral); other surgical history (Right, 09/07/2016); pr egd transoral biopsy single/multiple (N/A, 3/13/2017); Upper gastrointestinal endoscopy (2010); Upper gastrointestinal endoscopy (03/13/2017); Nasal septum surgery; and Upper gastrointestinal endoscopy (N/A, 3/28/2019). Family History  family history includes Cancer in her father; Diabetes in her father; High Blood Pressure in her father and mother; Stroke in her mother; Thyroid Disease in her mother. Social History  Tobacco use:  reports that she has been smoking cigarettes. She has a 5.00 pack-year smoking history. She has never used smokeless tobacco.  Alcohol use:  reports that she drinks alcohol. Drug use:  reports that she does not use drugs.       Medications  Current Medications:   Current Outpatient Medications   Medication Sig Dispense Clindamycin/lincomycin; Flagyl [metronidazole]; Biaxin [clarithromycin]; Ceftin [cefuroxime axetil]; and Keflex [cephalexin]      Review of Systems:  General: Denies any fever, chills. HEENT: Denies any diplopia, tinnitus or vertigo. Respiratory: Denies any shortness of breath or cough. Cardiac: Denies any chest pain, palpitations, claudication or edema. Gastrointestinal: Denies any melena, hematochezia, hematemesis or pyrosis. Genitourinary: Denies any frequency, urgency, hesitancy or incontinence. Hematologic: Denies bruising or bleeding easily. Endocrine: Denies any history of diabetes or thyroid disease. PHYSICAL EXAMINATION  Vitals:   Vitals:    03/26/19 1451   BP: 138/80   Pulse: 92   Temp: 99.9 °F (37.7 °C)     General Appearance:  awake, alert, oriented, in no acute distress, well developed, well nourished and in no acute distress  Skin:  Skin color, texture, turgor normal. No rashes or lesions. Head/face:  NCAT  Eyes:  No gross abnormalities. , PERRL, Pupils- PERRL. Ears:  canals and TMs NI and External- normal  Nose/Sinuses:  Nares normal. Septum midline. Mucosa normal. No drainage or sinus tenderness. Mouth/Throat:  Mucosa moist.  No lesions. Pharynx without erythema, edema or exudate. Lungs:  Normal expansion. Clear to auscultation. No rales, rhonchi, or wheezing., Breathing Pattern: regular, no distress, Breath sounds: normal  Heart:  Heart sounds are normal.  Regular rate and rhythm without murmur, gallop or rub. Auscultation: Normal S1 and S2.  Regular rhythm. No murmurs, gallops, or rubs. Abdomen:  Soft, non-tender, normal bowel sounds. No bruits, organomegaly or masses.   Musculoskeletal:  negative, negative findings: no erythema, induration, or nodules, ROM of all joints is normal, strength normal  Neurologic:  negative findings: proximal muscle strength normal, speech normal, mental status intact, cranial nerves 2-12 intact, muscle tone normal, muscle strength normal    LABS:  CBC   Lab Results   Component Value Date    WBC 9.2 03/31/2019    HGB 14.8 03/31/2019    HCT 43.6 03/31/2019     03/31/2019     BMP   Lab Results   Component Value Date     03/31/2019    K 3.6 03/31/2019     03/31/2019    CO2 23 03/31/2019    BUN 12 03/31/2019    CREATININE 0.84 03/31/2019    PHOS 2.6 02/17/2017    MG 1.9 01/11/2015     LFT's:   Lab Results   Component Value Date    AST 71 10/20/2018    ALT 67 10/20/2018    ALKPHOS 61 10/20/2018    BILITOT 0.40 10/20/2018    AMYLASE 47 10/20/2017    LIPASE 32 10/20/2017     COAGS: No results found for: INR, PTT  Lipids:   Lab Results   Component Value Date    CHOL 246 10/20/2018    HDL 44 10/20/2018    LDLCHOLESTEROL 165 10/20/2018    CHOLHDLRATIO 5.6 10/20/2018    TRIG 187 10/20/2018    VLDL NOT REPORTED 10/20/2018       RADIOLOGY:  All images reviewed and within normal limits unless otherwise specified: Yes    IMPRESSIONS:   Diagnosis Orders   1. Gastroesophageal reflux disease with esophagitis       Surgical Risk: low to moderate risk    PLAN:  1. EGD with 48 hour Bravo    Medical Decision Making: moderate complexity    Thank you for the interesting evaluation. Further recommendations to follow.     Kena Ulrich MD  Electronically signed 3/26/2019 at 10:00 PM

## 2019-03-28 ENCOUNTER — ANESTHESIA EVENT (OUTPATIENT)
Dept: OPERATING ROOM | Age: 39
End: 2019-03-28
Payer: COMMERCIAL

## 2019-03-28 ENCOUNTER — HOSPITAL ENCOUNTER (OUTPATIENT)
Age: 39
Setting detail: OUTPATIENT SURGERY
Discharge: HOME | End: 2019-03-28
Attending: SURGERY | Admitting: SURGERY
Payer: COMMERCIAL

## 2019-03-28 ENCOUNTER — ANESTHESIA (OUTPATIENT)
Dept: OPERATING ROOM | Age: 39
End: 2019-03-28
Payer: COMMERCIAL

## 2019-03-28 VITALS — OXYGEN SATURATION: 96 % | SYSTOLIC BLOOD PRESSURE: 148 MMHG | DIASTOLIC BLOOD PRESSURE: 68 MMHG

## 2019-03-28 VITALS
HEART RATE: 78 BPM | RESPIRATION RATE: 16 BRPM | TEMPERATURE: 97.6 F | OXYGEN SATURATION: 99 % | SYSTOLIC BLOOD PRESSURE: 139 MMHG | DIASTOLIC BLOOD PRESSURE: 99 MMHG

## 2019-03-28 LAB — GLUCOSE BLD-MCNC: 191 MG/DL (ref 65–105)

## 2019-03-28 PROCEDURE — 3700000000 HC ANESTHESIA ATTENDED CARE: Performed by: SURGERY

## 2019-03-28 PROCEDURE — 88305 TISSUE EXAM BY PATHOLOGIST: CPT

## 2019-03-28 PROCEDURE — 7100000011 HC PHASE II RECOVERY - ADDTL 15 MIN: Performed by: SURGERY

## 2019-03-28 PROCEDURE — 2709999900 HC NON-CHARGEABLE SUPPLY: Performed by: SURGERY

## 2019-03-28 PROCEDURE — 2580000003 HC RX 258: Performed by: SURGERY

## 2019-03-28 PROCEDURE — 2720000010 HC SURG SUPPLY STERILE: Performed by: SURGERY

## 2019-03-28 PROCEDURE — 3609012400 HC EGD TRANSORAL BIOPSY SINGLE/MULTIPLE: Performed by: SURGERY

## 2019-03-28 PROCEDURE — 43239 EGD BIOPSY SINGLE/MULTIPLE: CPT | Performed by: SURGERY

## 2019-03-28 PROCEDURE — 88342 IMHCHEM/IMCYTCHM 1ST ANTB: CPT

## 2019-03-28 PROCEDURE — 7100000010 HC PHASE II RECOVERY - FIRST 15 MIN: Performed by: SURGERY

## 2019-03-28 PROCEDURE — 3700000001 HC ADD 15 MINUTES (ANESTHESIA): Performed by: SURGERY

## 2019-03-28 PROCEDURE — 6360000002 HC RX W HCPCS: Performed by: NURSE ANESTHETIST, CERTIFIED REGISTERED

## 2019-03-28 PROCEDURE — 82947 ASSAY GLUCOSE BLOOD QUANT: CPT

## 2019-03-28 PROCEDURE — 00731 ANES UPR GI NDSC PX NOS: CPT | Performed by: NURSE ANESTHETIST, CERTIFIED REGISTERED

## 2019-03-28 RX ORDER — MIDAZOLAM HYDROCHLORIDE 1 MG/ML
INJECTION INTRAMUSCULAR; INTRAVENOUS PRN
Status: DISCONTINUED | OUTPATIENT
Start: 2019-03-28 | End: 2019-03-28 | Stop reason: SDUPTHER

## 2019-03-28 RX ORDER — SODIUM CHLORIDE, SODIUM LACTATE, POTASSIUM CHLORIDE, CALCIUM CHLORIDE 600; 310; 30; 20 MG/100ML; MG/100ML; MG/100ML; MG/100ML
INJECTION, SOLUTION INTRAVENOUS CONTINUOUS
Status: DISCONTINUED | OUTPATIENT
Start: 2019-03-28 | End: 2019-03-28 | Stop reason: HOSPADM

## 2019-03-28 RX ORDER — PROPOFOL 10 MG/ML
INJECTION, EMULSION INTRAVENOUS PRN
Status: DISCONTINUED | OUTPATIENT
Start: 2019-03-28 | End: 2019-03-28 | Stop reason: SDUPTHER

## 2019-03-28 RX ADMIN — MIDAZOLAM HYDROCHLORIDE 2 MG: 1 INJECTION, SOLUTION INTRAMUSCULAR; INTRAVENOUS at 12:22

## 2019-03-28 RX ADMIN — SODIUM CHLORIDE, POTASSIUM CHLORIDE, SODIUM LACTATE AND CALCIUM CHLORIDE: 600; 310; 30; 20 INJECTION, SOLUTION INTRAVENOUS at 11:30

## 2019-03-28 RX ADMIN — PROPOFOL 100 MG: 10 INJECTION, EMULSION INTRAVENOUS at 12:38

## 2019-03-28 RX ADMIN — PROPOFOL 100 MG: 10 INJECTION, EMULSION INTRAVENOUS at 12:25

## 2019-03-28 RX ADMIN — PROPOFOL 100 MG: 10 INJECTION, EMULSION INTRAVENOUS at 12:32

## 2019-03-28 ASSESSMENT — PAIN SCALES - GENERAL
PAINLEVEL_OUTOF10: 0

## 2019-03-28 NOTE — H&P
General Surgery History & Physical    Pt Name: Stiven Griffith  MRN: [id number]  YOB: 1980  Date of evaluation: 3/26/2019  Primary Care Physician: Dominique Diaz DO     Patient evaluated at the request of Aleida Wagner NP  Reason for evaluation: GERD                   SUBJECTIVE:  Chief Complaint: GERD     History of Present Illness:  EGD         Nausea: yes, has dizziness  Vomiting: no  Heartburn:yes, once a week  Dysphagia:no  Hematemesis:yes, nasal and coughs up blood  Epigastric pain:yes, usually after eating  Anemia: no  Previous work up date:March132017  Current Treatment:Nexium 40 mg daily and Zantac 300 mg at hs over 3 years        Past Medical History   has a past medical history of Allergic rhinitis, GERD (gastroesophageal reflux disease), Hemorrhoids, Hyperlipidemia, Hypertension, Hypoglycemia, Kidney stones, Tobacco abuse, and UTI (urinary tract infection). Past Surgical History   has a past surgical history that includes Lithotripsy; Tubal ligation; Endometrial ablation; Carpal tunnel release (Left); Refractive surgery (Bilateral); other surgical history (Right, 09/07/2016); pr egd transoral biopsy single/multiple (N/A, 3/13/2017); Upper gastrointestinal endoscopy (2010); Upper gastrointestinal endoscopy (03/13/2017); and Nasal septum surgery. Family History  family history includes Cancer in her father; Diabetes in her father; High Blood Pressure in her father and mother; Stroke in her mother; Thyroid Disease in her mother. Social History  Tobacco use:  reports that she has been smoking cigarettes. She has a 2.50 pack-year smoking history. She has never used smokeless tobacco.  Alcohol use:  reports that she drinks alcohol. Drug use:  reports that she does not use drugs.         Medications  Current Medications:   Current Facility-Administered Medications          Current Outpatient Medications   Medication Sig Dispense Refill    albuterol sulfate  (90 Base) MCG/ACT inhaler Inhale 2 puffs into the lungs 4 times daily as needed for Wheezing 1 Inhaler 0    glimepiride (AMARYL) 1 MG tablet Take 1 tablet by mouth every morning 30 tablet 2    Norethin Ace-Eth Estrad-FE (MINASTRIN 24 FE) 1-20 MG-MCG(24) CHEW Take 1 tablet by mouth daily        azelastine (ASTELIN) 0.1 % nasal spray 1 spray by Nasal route 2 times daily Use in each nostril as directed 1 Bottle 3    fexofenadine (ALLEGRA ALLERGY) 180 MG tablet Take 1 tablet by mouth daily 30 tablet 11    esomeprazole (NEXIUM) 20 MG delayed release capsule Take 2 capsules once daily 30-60 minutes prior to a meal 120 capsule 11    Lactobacillus (RA ACIDOPHILUS) 300 MG CAPS take 1 capsule by mouth once daily 30 capsule 11    glucose monitoring kit (FREESTYLE) monitoring kit 1 kit by Does not apply route daily 1 kit 0    Lancets MISC Use to check glucose 3x's weekly. 100 each 1    Glucose Blood (BLOOD GLUCOSE TEST STRIPS) STRP Use to check glucose 3x's weekly. 100 strip 1    ranitidine (ZANTAC) 300 MG tablet take 1 tablet by mouth at bedtime 90 tablet 3    fluticasone (FLONASE) 50 MCG/ACT nasal spray 2 sprays to each nostril once daily. 3 Bottle 3    fluconazole (DIFLUCAN) 150 MG tablet Take one daily now and repeat dose in 4 days. 2 tablet 3      No current facility-administered medications for this visit. Home Medications:   Home Medications           Prior to Admission medications    Medication Sig Start Date End Date Taking?  Authorizing Provider   albuterol sulfate  (90 Base) MCG/ACT inhaler Inhale 2 puffs into the lungs 4 times daily as needed for Wheezing 3/18/19   Yes Fany Puentes, APRN - CNP   glimepiride (AMARYL) 1 MG tablet Take 1 tablet by mouth every morning 2/26/19   Yes Jamal Eason,    Norethin Ace-Eth Estrad-FE (MINASTRIN 24 FE) 1-20 MG-MCG(24) CHEW Take 1 tablet by mouth daily     Yes Historical Provider, MD   azelastine (ASTELIN) 0.1 % nasal spray 1 spray by Nasal route 2 times daily Use in each nostril as directed 2/7/19   Yes Marcio Pace MD   fexofenadine [facility] ALLERGY) 180 MG tablet Take 1 tablet by mouth daily 11/29/18   Yes Cheryl Eason DO   esomeprazole (NEXIUM) 20 MG delayed release capsule Take 2 capsules once daily 30-60 minutes prior to a meal 10/29/18   Yes DEAN Blue CNP   Lactobacillus (RA ACIDOPHILUS) 300 MG CAPS take 1 capsule by mouth once daily 10/29/18   Yes Cheryl Eason DO   glucose monitoring kit (FREESTYLE) monitoring kit 1 kit by Does not apply route daily 6/22/18   Yes Cheryl Eason DO   Lancets MISC Use to check glucose 3x's weekly. 6/22/18   Yes Cheryl Eason DO   Glucose Blood (BLOOD GLUCOSE TEST STRIPS) STRP Use to check glucose 3x's weekly. 6/22/18   Yes Cheryl Eason DO   ranitidine (ZANTAC) 300 MG tablet take 1 tablet by mouth at bedtime 3/27/18   Yes DEAN Blue CNP   fluticasone (FLONASE) 50 MCG/ACT nasal spray 2 sprays to each nostril once daily. 3/27/18   Yes DEAN Blue CNP   fluconazole (DIFLUCAN) 150 MG tablet Take one daily now and repeat dose in 4 days. 2/26/19     Benny Davis DO            Allergies  Latex; Amoxil [amoxicillin]; Bactrim [sulfamethoxazole-trimethoprim]; Clindamycin/lincomycin; Flagyl [metronidazole]; Biaxin [clarithromycin]; Ceftin [cefuroxime axetil]; and Keflex [cephalexin]        Review of Systems:  General: Denies any fever, chills. HEENT: Denies any diplopia, tinnitus or vertigo. Respiratory: Denies any shortness of breath or cough. Cardiac: Denies any chest pain, palpitations, claudication or edema. Gastrointestinal: Denies any melena, hematochezia, hematemesis or pyrosis. Genitourinary: Denies any frequency, urgency, hesitancy or incontinence. Hematologic: Denies bruising or bleeding easily. Endocrine: Denies any history of diabetes or thyroid disease.      PHYSICAL EXAMINATION  Vitals:       Vitals:     03/26/19 1451   BP: 138/80   Pulse: 92   Temp: 99.9 °F (37.7 °C) LDLCHOLESTEROL 165 10/20/2018     CHOLHDLRATIO 5.6 10/20/2018     TRIG 187 10/20/2018     VLDL NOT REPORTED 10/20/2018         RADIOLOGY:  All images reviewed and within normal limits unless otherwise specified: Yes     IMPRESSIONS:  1. Question about extent of GERD. Could it be causing laryngeal issues  Surgical Risk: low to moderate risk     PLAN:  1.  EGD with 48 hour bravo                      Medical Decision Making: moderate

## 2019-03-28 NOTE — OP NOTE
EGD PROCEDURE NOTE AND  48 HOUR BRAVO:     Pre op diagnosis:    1. Questionable laryngeal GERD   2. Nasal congestion, post nasal drip and throat clearing    Operative Procedure:    1. EGD with cold biopsy    Surgeon:    Dr. Liz Serrano     Anesthesia:    IV sedation per CRNA    EBL:  minimal      Procedure:    Patient was taken to the endoscopy suite and placed in a left lateral decubitus position. They were given IV sedation as mentioned above. The gastric scope was passed through the mouth piece and down the esophagus, stomach and into the second portion of the duodenum. It was withdrawn slowly and cold biopsies were taken in the antrum,body of the stomach and the distal esophagus. The scope was retroflexed in the stomach and GE junction was examined. The following findings were noted. On the removal of the gastroscope during the EGD, the GE junction was measured. Then we calculated the placement of the device by subtracting 6 cm from the GE junction. We then inserted the bravo deployment catheter up to the measured point. We then reinserted the gastroscope into the upper esophagus to verify that the catheter was in the esophagus and not into the trachea. It was in the esophagus, so we proceeded. We connected the device to suction for 30 sec. We then depressed the deployment button and held down for 5 sec. Then we released the device and removed the catheter. The bravo device was successfully deployed. We recheck its placement with the gastroscope one last time and it was in the propper place. The pt was then taken back to RR . Final Diagnosis:    1.  GE junction at 37 cm  2. Mild inflammation DE, up about 2 cm  3. Gastric polyps  4.  48 hour bravo deployed successfully at 31 cm    Plan:    1. Await bx  2. Await 48 bravo results  3. Then make further recommendations    ADDENDUM:  Endo Review :  4/20/2019    Pathology:      -- Diagnosis --   1.   ANTRUM BIOPSY:  MODERATE CHRONIC INACTIVE GASTRITIS. NEGATIVE FOR   HELICOBACTER PYLORI ON IMMUNOSTAIN. 2.  BODY POLYP BIOPSY:  FUNDIC GLAND POLYP. 3.  DISTAL ESOPHAGUS BIOPSY:  GASTRIC MUCOSA WITH MILD CHRONIC   INFLAMMATION. NEGATIVE FOR INTESTINAL METAPLASIA AND DYSPLASIA. Plan:    1.   Will wait until  Results of 48 hour Bravo

## 2019-03-31 ENCOUNTER — APPOINTMENT (OUTPATIENT)
Dept: CT IMAGING | Age: 39
End: 2019-03-31
Payer: COMMERCIAL

## 2019-03-31 ENCOUNTER — TELEPHONE (OUTPATIENT)
Dept: SURGERY | Age: 39
End: 2019-03-31

## 2019-03-31 ENCOUNTER — HOSPITAL ENCOUNTER (EMERGENCY)
Age: 39
Discharge: HOME | End: 2019-03-31
Attending: EMERGENCY MEDICINE
Payer: COMMERCIAL

## 2019-03-31 ENCOUNTER — APPOINTMENT (OUTPATIENT)
Dept: GENERAL RADIOLOGY | Age: 39
End: 2019-03-31
Payer: COMMERCIAL

## 2019-03-31 VITALS
DIASTOLIC BLOOD PRESSURE: 91 MMHG | BODY MASS INDEX: 26.58 KG/M2 | OXYGEN SATURATION: 96 % | TEMPERATURE: 99 F | WEIGHT: 150 LBS | RESPIRATION RATE: 14 BRPM | HEART RATE: 89 BPM | SYSTOLIC BLOOD PRESSURE: 134 MMHG | HEIGHT: 63 IN

## 2019-03-31 DIAGNOSIS — R07.9 CHEST PAIN, UNSPECIFIED TYPE: Primary | ICD-10-CM

## 2019-03-31 LAB
ABSOLUTE EOS #: 0.3 K/UL (ref 0–0.4)
ABSOLUTE IMMATURE GRANULOCYTE: NORMAL K/UL (ref 0–0.3)
ABSOLUTE LYMPH #: 2.8 K/UL (ref 1–4.8)
ABSOLUTE MONO #: 0.5 K/UL (ref 0.1–1.2)
ANION GAP SERPL CALCULATED.3IONS-SCNC: 17 MMOL/L (ref 9–17)
BASOPHILS # BLD: 1 % (ref 0–1)
BASOPHILS ABSOLUTE: 0.1 K/UL (ref 0–0.2)
BUN BLDV-MCNC: 12 MG/DL (ref 6–20)
BUN/CREAT BLD: 14 (ref 9–20)
CALCIUM SERPL-MCNC: 9 MG/DL (ref 8.6–10.4)
CHLORIDE BLD-SCNC: 101 MMOL/L (ref 98–107)
CO2: 23 MMOL/L (ref 20–31)
CREAT SERPL-MCNC: 0.84 MG/DL (ref 0.5–0.9)
D DIMER: <100 NG/ML
DIFFERENTIAL TYPE: NORMAL
EOSINOPHILS RELATIVE PERCENT: 3 % (ref 1–7)
GFR AFRICAN AMERICAN: >60 ML/MIN
GFR NON-AFRICAN AMERICAN: >60 ML/MIN
GFR SERPL CREATININE-BSD FRML MDRD: ABNORMAL ML/MIN/{1.73_M2}
GFR SERPL CREATININE-BSD FRML MDRD: ABNORMAL ML/MIN/{1.73_M2}
GLUCOSE BLD-MCNC: 242 MG/DL (ref 70–99)
HCG QUALITATIVE: NEGATIVE
HCT VFR BLD CALC: 43.6 % (ref 36–46)
HEMOGLOBIN: 14.8 G/DL (ref 12–16)
IMMATURE GRANULOCYTES: NORMAL %
LYMPHOCYTES # BLD: 31 % (ref 16–46)
MCH RBC QN AUTO: 32.2 PG (ref 26–34)
MCHC RBC AUTO-ENTMCNC: 34 G/DL (ref 31–37)
MCV RBC AUTO: 94.7 FL (ref 80–100)
MONOCYTES # BLD: 5 % (ref 4–11)
NRBC AUTOMATED: NORMAL PER 100 WBC
PDW BLD-RTO: 13.1 % (ref 11–14.5)
PLATELET # BLD: 197 K/UL (ref 140–450)
PLATELET ESTIMATE: NORMAL
PMV BLD AUTO: 9.7 FL (ref 6–12)
POTASSIUM SERPL-SCNC: 3.6 MMOL/L (ref 3.7–5.3)
RBC # BLD: 4.6 M/UL (ref 4–5.2)
RBC # BLD: NORMAL 10*6/UL
SEG NEUTROPHILS: 60 % (ref 43–77)
SEGMENTED NEUTROPHILS ABSOLUTE COUNT: 5.5 K/UL (ref 1.8–7.7)
SODIUM BLD-SCNC: 141 MMOL/L (ref 135–144)
TROPONIN INTERP: NORMAL
TROPONIN INTERP: NORMAL
TROPONIN T: NORMAL NG/ML
TROPONIN T: NORMAL NG/ML
TROPONIN, HIGH SENSITIVITY: <6 NG/L (ref 0–14)
TROPONIN, HIGH SENSITIVITY: <6 NG/L (ref 0–14)
WBC # BLD: 9.2 K/UL (ref 3.5–11)
WBC # BLD: NORMAL 10*3/UL

## 2019-03-31 PROCEDURE — 96372 THER/PROPH/DIAG INJ SC/IM: CPT

## 2019-03-31 PROCEDURE — 99285 EMERGENCY DEPT VISIT HI MDM: CPT

## 2019-03-31 PROCEDURE — 85379 FIBRIN DEGRADATION QUANT: CPT

## 2019-03-31 PROCEDURE — 93005 ELECTROCARDIOGRAM TRACING: CPT

## 2019-03-31 PROCEDURE — 96374 THER/PROPH/DIAG INJ IV PUSH: CPT

## 2019-03-31 PROCEDURE — 96375 TX/PRO/DX INJ NEW DRUG ADDON: CPT

## 2019-03-31 PROCEDURE — 84484 ASSAY OF TROPONIN QUANT: CPT

## 2019-03-31 PROCEDURE — 2580000003 HC RX 258: Performed by: EMERGENCY MEDICINE

## 2019-03-31 PROCEDURE — 80048 BASIC METABOLIC PNL TOTAL CA: CPT

## 2019-03-31 PROCEDURE — 71045 X-RAY EXAM CHEST 1 VIEW: CPT

## 2019-03-31 PROCEDURE — 71250 CT THORAX DX C-: CPT

## 2019-03-31 PROCEDURE — 84703 CHORIONIC GONADOTROPIN ASSAY: CPT

## 2019-03-31 PROCEDURE — 6370000000 HC RX 637 (ALT 250 FOR IP): Performed by: EMERGENCY MEDICINE

## 2019-03-31 PROCEDURE — 85025 COMPLETE CBC W/AUTO DIFF WBC: CPT

## 2019-03-31 PROCEDURE — 6360000002 HC RX W HCPCS: Performed by: EMERGENCY MEDICINE

## 2019-03-31 PROCEDURE — 36415 COLL VENOUS BLD VENIPUNCTURE: CPT

## 2019-03-31 RX ORDER — DICYCLOMINE HYDROCHLORIDE 10 MG/ML
20 INJECTION INTRAMUSCULAR ONCE
Status: COMPLETED | OUTPATIENT
Start: 2019-03-31 | End: 2019-03-31

## 2019-03-31 RX ORDER — MAGNESIUM HYDROXIDE/ALUMINUM HYDROXICE/SIMETHICONE 120; 1200; 1200 MG/30ML; MG/30ML; MG/30ML
30 SUSPENSION ORAL
Status: COMPLETED | OUTPATIENT
Start: 2019-03-31 | End: 2019-03-31

## 2019-03-31 RX ORDER — LORAZEPAM 0.5 MG/1
0.5 TABLET ORAL EVERY 6 HOURS PRN
COMMUNITY
End: 2020-07-22 | Stop reason: SDUPTHER

## 2019-03-31 RX ORDER — DICYCLOMINE HYDROCHLORIDE 10 MG/1
20 CAPSULE ORAL EVERY 6 HOURS PRN
Qty: 30 CAPSULE | Refills: 0 | Status: SHIPPED | OUTPATIENT
Start: 2019-03-31 | End: 2020-06-10

## 2019-03-31 RX ORDER — LORAZEPAM 2 MG/ML
1 INJECTION INTRAMUSCULAR ONCE
Status: COMPLETED | OUTPATIENT
Start: 2019-03-31 | End: 2019-03-31

## 2019-03-31 RX ORDER — 0.9 % SODIUM CHLORIDE 0.9 %
1000 INTRAVENOUS SOLUTION INTRAVENOUS ONCE
Status: COMPLETED | OUTPATIENT
Start: 2019-03-31 | End: 2019-03-31

## 2019-03-31 RX ORDER — IBUPROFEN 800 MG/1
800 TABLET ORAL EVERY 8 HOURS PRN
Qty: 30 TABLET | Refills: 0 | Status: SHIPPED | OUTPATIENT
Start: 2019-03-31 | End: 2019-03-31

## 2019-03-31 RX ORDER — KETOROLAC TROMETHAMINE 30 MG/ML
30 INJECTION, SOLUTION INTRAMUSCULAR; INTRAVENOUS ONCE
Status: COMPLETED | OUTPATIENT
Start: 2019-03-31 | End: 2019-03-31

## 2019-03-31 RX ORDER — HYDROCODONE BITARTRATE AND ACETAMINOPHEN 5; 325 MG/1; MG/1
2 TABLET ORAL ONCE
Status: DISCONTINUED | OUTPATIENT
Start: 2019-03-31 | End: 2019-03-31

## 2019-03-31 RX ORDER — HYDROCODONE BITARTRATE AND ACETAMINOPHEN 5; 325 MG/1; MG/1
1 TABLET ORAL EVERY 6 HOURS PRN
Qty: 10 TABLET | Refills: 0 | Status: SHIPPED | OUTPATIENT
Start: 2019-03-31 | End: 2019-03-31

## 2019-03-31 RX ADMIN — LORAZEPAM 1 MG: 2 INJECTION INTRAMUSCULAR; INTRAVENOUS at 18:43

## 2019-03-31 RX ADMIN — KETOROLAC TROMETHAMINE 30 MG: 30 INJECTION, SOLUTION INTRAMUSCULAR; INTRAVENOUS at 18:44

## 2019-03-31 RX ADMIN — DICYCLOMINE HYDROCHLORIDE 20 MG: 20 INJECTION, SOLUTION INTRAMUSCULAR at 21:43

## 2019-03-31 RX ADMIN — LIDOCAINE HYDROCHLORIDE 15 ML: 20 SOLUTION ORAL; TOPICAL at 18:41

## 2019-03-31 RX ADMIN — SODIUM CHLORIDE 1000 ML: 9 INJECTION, SOLUTION INTRAVENOUS at 19:26

## 2019-03-31 RX ADMIN — ALUMINUM HYDROXIDE, MAGNESIUM HYDROXIDE, AND SIMETHICONE 30 ML: 200; 200; 20 SUSPENSION ORAL at 18:41

## 2019-03-31 ASSESSMENT — PAIN SCALES - GENERAL
PAINLEVEL_OUTOF10: 9
PAINLEVEL_OUTOF10: 4
PAINLEVEL_OUTOF10: 9
PAINLEVEL_OUTOF10: 4
PAINLEVEL_OUTOF10: 4

## 2019-03-31 ASSESSMENT — PAIN DESCRIPTION - PAIN TYPE: TYPE: ACUTE PAIN

## 2019-03-31 ASSESSMENT — PAIN DESCRIPTION - ORIENTATION: ORIENTATION: MID;ANTERIOR

## 2019-03-31 ASSESSMENT — PAIN - FUNCTIONAL ASSESSMENT: PAIN_FUNCTIONAL_ASSESSMENT: 0-10

## 2019-03-31 ASSESSMENT — PAIN DESCRIPTION - LOCATION: LOCATION: CHEST

## 2019-03-31 NOTE — ED PROVIDER NOTES
Hyperlipidemia, Hypertension, Hypoglycemia, Kidney stones, Tobacco abuse, and UTI (urinary tract infection). SURGICAL HISTORY      has a past surgical history that includes Lithotripsy; Tubal ligation; Endometrial ablation; Carpal tunnel release (Left); Refractive surgery (Bilateral); other surgical history (Right, 09/07/2016); pr egd transoral biopsy single/multiple (N/A, 3/13/2017); Upper gastrointestinal endoscopy (2010); Upper gastrointestinal endoscopy (03/13/2017); Nasal septum surgery; and Upper gastrointestinal endoscopy (N/A, 3/28/2019). CURRENT MEDICATIONS       Previous Medications    ALBUTEROL SULFATE  (90 BASE) MCG/ACT INHALER    Inhale 2 puffs into the lungs 4 times daily as needed for Wheezing    AZELASTINE (ASTELIN) 0.1 % NASAL SPRAY    1 spray by Nasal route 2 times daily Use in each nostril as directed    ESOMEPRAZOLE (NEXIUM) 20 MG DELAYED RELEASE CAPSULE    Take 2 capsules once daily 30-60 minutes prior to a meal    FEXOFENADINE (ALLEGRA ALLERGY) 180 MG TABLET    Take 1 tablet by mouth daily    FLUCONAZOLE (DIFLUCAN) 150 MG TABLET    Take one daily now and repeat dose in 4 days. FLUTICASONE (FLONASE) 50 MCG/ACT NASAL SPRAY    2 sprays to each nostril once daily. GLIMEPIRIDE (AMARYL) 1 MG TABLET    Take 1 tablet by mouth every morning    GLUCOSE BLOOD (BLOOD GLUCOSE TEST STRIPS) STRP    Use to check glucose 3x's weekly. GLUCOSE MONITORING KIT (FREESTYLE) MONITORING KIT    1 kit by Does not apply route daily    LACTOBACILLUS (RA ACIDOPHILUS) 300 MG CAPS    take 1 capsule by mouth once daily    LANCETS MISC    Use to check glucose 3x's weekly. LORAZEPAM (ATIVAN) 0.5 MG TABLET    Take 0.5 mg by mouth every 6 hours as needed for Anxiety.     Camille Board ESTRAD-FE (MINASTRIN 24 FE) 1-20 MG-MCG(24) CHEW    Take 1 tablet by mouth daily    RANITIDINE (ZANTAC) 300 MG TABLET    take 1 tablet by mouth at bedtime       ALLERGIES     is allergic to latex; amoxil [amoxicillin]; bactrim [sulfamethoxazole-trimethoprim]; clindamycin/lincomycin; flagyl [metronidazole]; biaxin [clarithromycin]; ceftin [cefuroxime axetil]; and keflex [cephalexin]. FAMILY HISTORY     indicated that her mother is alive. She indicated that her father is alive. family history includes Cancer in her father; Diabetes in her father; High Blood Pressure in her father and mother; Stroke in her mother; Thyroid Disease in her mother. SOCIAL HISTORY      reports that she has been smoking cigarettes. She has a 5.00 pack-year smoking history. She has never used smokeless tobacco. She reports that she drinks alcohol. She reports that she does not use drugs. PHYSICAL EXAM     INITIAL VITALS:  height is 5' 3\" (1.6 m) and weight is 150 lb (68 kg). Constitutional: The patient is alert and well-developed, vital signs as noted. Psychiatric: Oriented to time, place and person, affect is appropriate for age. Eyes: Pupils equal and reactive to light. EOMI. Ears, nose, and throat: Oropharynx clear  Neck: No masses, trachea midline, and no thyromegaly. Respiratory: Clear to auscultation, full aeration throughout all fields. Cardiovascular: No murmurs, heart sounds normal, no thrills. Gastrointestinal: No masses, no hepatosplenomegaly, bowel sounds positive. No tenderness. Skin: No rashes or lesions on exposed surfaces, good skin turgor. Well-hydrated and nontoxic  Extremities: Good range of motion, no edema. DIFFERENTIAL DIAGNOSIS/ MEDICAL DECISION MAKING:     EKG shows a normal sinus rhythm of 98 with normal intervals and normal axis. Poor airway progression but no evidence of acute MI or ischemia. I interpreted. GI cocktail given and Toradol is given and IV fluids are administered    .   PLAN/ TASKS OUTSTANDING     Care of this patient has been endorsed to Dr. Yomi Walsh at 7:30 PM.  We have discussed in detail the patient's history of present illness, physical exam, completed studies as well as

## 2019-04-01 ENCOUNTER — TELEPHONE (OUTPATIENT)
Dept: SURGERY | Age: 39
End: 2019-04-01

## 2019-04-01 LAB
EKG ATRIAL RATE: 86 BPM
EKG ATRIAL RATE: 98 BPM
EKG P AXIS: 46 DEGREES
EKG P AXIS: 48 DEGREES
EKG P-R INTERVAL: 134 MS
EKG P-R INTERVAL: 140 MS
EKG Q-T INTERVAL: 354 MS
EKG Q-T INTERVAL: 368 MS
EKG QRS DURATION: 82 MS
EKG QRS DURATION: 86 MS
EKG QTC CALCULATION (BAZETT): 440 MS
EKG QTC CALCULATION (BAZETT): 451 MS
EKG R AXIS: 22 DEGREES
EKG R AXIS: 22 DEGREES
EKG T AXIS: 37 DEGREES
EKG T AXIS: 5 DEGREES
EKG VENTRICULAR RATE: 86 BPM
EKG VENTRICULAR RATE: 98 BPM
SURGICAL PATHOLOGY REPORT: NORMAL

## 2019-04-01 NOTE — ED PROVIDER NOTES
ATTENDING  ADDENDUM     Care of this patient was assumed from Dr. Cristian Bender. The patient was seen for Chest Pain (Bravo test done thursday and since then patient with increased chest pain)  . The patient's initial evaluation and plan have been discussed with the prior provider who initially evaluated the patient. Nursing Notes, Past Medical Hx, Past Surgical Hx, Social Hx, Allergies, and Family Hx were all reviewed. ED COURSE      The patient was given the following medications:  Orders Placed This Encounter   Medications    ketorolac (TORADOL) injection 30 mg    LORazepam (ATIVAN) injection 1 mg    aluminum & magnesium hydroxide-simethicone (MAALOX) 200-200-20 MG/5ML suspension 30 mL    lidocaine viscous (XYLOCAINE) 2 % solution 15 mL    DISCONTD: ibuprofen (IBU) 800 MG tablet     Sig: Take 1 tablet by mouth every 8 hours as needed for Pain     Dispense:  30 tablet     Refill:  0    DISCONTD: HYDROcodone-acetaminophen (NORCO) 5-325 MG per tablet     Sig: Take 1 tablet by mouth every 6 hours as needed for Pain for up to 3 days. Dispense:  10 tablet     Refill:  0    DISCONTD: HYDROcodone-acetaminophen (NORCO) 5-325 MG per tablet 2 tablet    0.9 % sodium chloride bolus    dicyclomine (BENTYL) 10 MG capsule     Sig: Take 2 capsules by mouth every 6 hours as needed (cramps)     Dispense:  30 capsule     Refill:  0    dicyclomine (BENTYL) injection 20 mg       RECENT VITALS:   Temp: 99 °F (37.2 °C), Pulse: 89, Resp: 14, BP: (!) 134/91    MEDICAL DECISION MAKING       Ct Head Wo Contrast    Result Date: 3/24/2019  EXAMINATION: CT OF THE HEAD WITHOUT CONTRAST  3/24/2019 3:26 pm TECHNIQUE: CT of the head was performed without the administration of intravenous contrast. Dose modulation, iterative reconstruction, and/or weight based adjustment of the mA/kV was utilized to reduce the radiation dose to as low as reasonably achievable. COMPARISON: None.  HISTORY: ORDERING SYSTEM PROVIDED HISTORY: severe headache TECHNOLOGIST PROVIDED HISTORY: Ordering Physician Provided Reason for Exam: Left sided headache, was treated for sinus infection last week it helped for a few days but then the headache increased on Friday FINDINGS: BRAIN/VENTRICLES: No acute intracranial hemorrhage, mass effect or midline shift. No abnormal extra-axial fluid collection. The gray-white differentiation is maintained without evidence of an acute infarct. No evidence of hydrocephalus. ORBITS: The visualized portion of the orbits demonstrate no acute abnormality. SINUSES: The visualized paranasal sinuses and mastoid air cells demonstrate no acute abnormality. SOFT TISSUES/SKULL:  No acute abnormality of the visualized skull or soft tissues. Unremarkable noncontrast CT examination of the brain. Ct Chest Wo Contrast    Result Date: 3/31/2019  EXAMINATION: CT OF THE CHEST WITHOUT CONTRAST 3/31/2019 7:57 pm TECHNIQUE: CT of the chest was performed without the administration of intravenous contrast. Multiplanar reformatted images are provided for review. Dose modulation, iterative reconstruction, and/or weight based adjustment of the mA/kV was utilized to reduce the radiation dose to as low as reasonably achievable. COMPARISON: Chest radiograph earlier today. HISTORY: ORDERING SYSTEM PROVIDED HISTORY: Chest foreign body rule out intrathoracic location TECHNOLOGIST PROVIDED HISTORY: Ordering Physician Provided Reason for Exam: Metal object seen on CXR, ct done to determine the exact location of the object. Pt had a Bravo placed on thursday Acuity: Acute Type of Exam: Subsequent/Follow-up FINDINGS: Mediastinum: The heart is normal in size. There is no pericardial effusion. The thoracic aorta is normal in caliber. No abnormal mediastinal or hilar lymph node is seen. There is a 3 cm long metallic object in the midthoracic esophagus. There is no surrounding esophageal wall thickening or pneumomediastinum. Lungs/pleura: The lungs are clear. No pneumothorax or pleural effusion is seen. Airways are unremarkable. Upper Abdomen: No acute abnormality in the visualized upper abdomen. Diverticulosis noted at the colonic hepatic flexure. Punctate nonobstructing bilateral upper pole renal calculus. Soft Tissues/Bones: No acute osseous or soft tissue abnormality. 1. No acute abnormality in the chest. 2. Metallic object in the midthoracic esophagus consistent with recently implanted pH monitoring device. No surrounding esophageal wall thickening or pneumomediastinum. 3. Punctate nonobstructing bilateral nephrolithiasis. Xr Chest Portable    Result Date: 3/31/2019  EXAMINATION: SINGLE XRAY VIEW OF THE CHEST 3/31/2019 6:37 pm COMPARISON: 04/02/2018 HISTORY: ORDERING SYSTEM PROVIDED HISTORY: Chest pain TECHNOLOGIST PROVIDED HISTORY: Chest pain Ordering Physician Provided Reason for Exam: Patient with increase chest pain to mid anterior chest since Thursday since Bravo procedure FINDINGS: Heart size and pulmonary vascularity are within normal limits the lungs are clear. A 3.5 cm long metallic density projects over the mediastinum and descending thoracic aorta. No acute cardiopulmonary disease. Metallic density projects over the mediastinum. The location of this object is not certain and intrathoracic location cannot be excluded. Clinical correlation recommended.      Results for orders placed or performed during the hospital encounter of 55/51/83   Basic Metabolic Panel   Result Value Ref Range    Glucose 242 (H) 70 - 99 mg/dL    BUN 12 6 - 20 mg/dL    CREATININE 0.84 0.50 - 0.90 mg/dL    Bun/Cre Ratio 14 9 - 20    Calcium 9.0 8.6 - 10.4 mg/dL    Sodium 141 135 - 144 mmol/L    Potassium 3.6 (L) 3.7 - 5.3 mmol/L    Chloride 101 98 - 107 mmol/L    CO2 23 20 - 31 mmol/L    Anion Gap 17 9 - 17 mmol/L    GFR Non-African American >60 >60 mL/min    GFR African American >60 >60 mL/min    GFR Comment          GFR Staging NOT REPORTED    Troponin   Result Value Ref Range    Troponin, High Sensitivity <6 0 - 14 ng/L    Troponin T NOT REPORTED <0.03 ng/mL    Troponin Interp NOT REPORTED    CBC Auto Differential   Result Value Ref Range    WBC 9.2 3.5 - 11.0 k/uL    RBC 4.60 4.0 - 5.2 m/uL    Hemoglobin 14.8 12.0 - 16.0 g/dL    Hematocrit 43.6 36 - 46 %    MCV 94.7 80 - 100 fL    MCH 32.2 26 - 34 pg    MCHC 34.0 31 - 37 g/dL    RDW 13.1 11.0 - 14.5 %    Platelets 743 131 - 397 k/uL    MPV 9.7 6.0 - 12.0 fL    NRBC Automated NOT REPORTED per 100 WBC    Differential Type NOT REPORTED     Immature Granulocytes NOT REPORTED 0 %    Absolute Immature Granulocyte NOT REPORTED 0.00 - 0.30 k/uL    WBC Morphology NOT REPORTED     RBC Morphology NOT REPORTED     Platelet Estimate NOT REPORTED     Seg Neutrophils 60 43 - 77 %    Lymphocytes 31 16 - 46 %    Monocytes 5 4 - 11 %    Eosinophils % 3 1 - 7 %    Basophils 1 0 - 1 %    Segs Absolute 5.50 1.8 - 7.7 k/uL    Absolute Lymph # 2.80 1.0 - 4.8 k/uL    Absolute Mono # 0.50 0.1 - 1.2 k/uL    Absolute Eos # 0.30 0.0 - 0.4 k/uL    Basophils # 0.10 0.0 - 0.2 k/uL   D-Dimer   Result Value Ref Range    D-Dimer, Quant <100 <400 ng/mL   HCG Qualitative, Serum   Result Value Ref Range    hCG Qual NEGATIVE NEGATIVE   Troponin   Result Value Ref Range    Troponin, High Sensitivity <6 0 - 14 ng/L    Troponin T NOT REPORTED <0.03 ng/mL    Troponin Interp NOT REPORTED    EKG 12 Lead   Result Value Ref Range    Ventricular Rate 98 BPM    Atrial Rate 98 BPM    P-R Interval 134 ms    QRS Duration 82 ms    Q-T Interval 354 ms    QTc Calculation (Bazett) 451 ms    P Axis 48 degrees    R Axis 22 degrees    T Axis 37 degrees   EKG 12 Lead   Result Value Ref Range    Ventricular Rate 86 BPM    Atrial Rate 86 BPM    P-R Interval 140 ms    QRS Duration 86 ms    Q-T Interval 368 ms    QTc Calculation (Bazett) 440 ms    P Axis 46 degrees    R Axis 22 degrees    T Axis 5 degrees       Patient has been reassured. GI cocktail did improve her symptoms.   I will place patient on Bentyl to see if that helps her with her symptoms. She's been told to continue to take her Nexium. I directed her back to her surgeon. Impression:  1.  Chest pain, unspecified type        Condition: Fair    Prescription: Bentyl      Haim Sal MD, Amarjit Singh  Emergency Medicine Attending        Haim Sal MD  03/31/19 8217

## 2019-04-01 NOTE — TELEPHONE ENCOUNTER
There was nothing serious on full work up in the ER. It is probably her GERD, it was also probably exacerbated by going of her meds for the testing. I would make sure she is taking nexium bid and zantac 300 mg nightly. Then we can also give her a GI cocktail for a week. ( you have to call our pharmacy or [facility] and see if they can make it up for the pt). If still persists in next few days may try some calcium channel blocker. I significant may need to come back to ER and get admitted for observation.

## 2019-04-01 NOTE — TELEPHONE ENCOUNTER
Patient is feeling better, but would like GI cocktail ordered. Spoke with [facility] pharmacist. Order given for Maalox and lidocaine viscous 3:1 ratio. Take 30ml Q6 hours as needed. 400cc with one refill. Verbal order of Dr. Jose Guadalupe Delatorre. Patient notified that she may  at the pharmacy.

## 2019-04-01 NOTE — TELEPHONE ENCOUNTER
Patient had EGD with BRAVO placement done on 3/28/19. She c/o of upper chest pain and went to ER on 3/31/19. She states they did a work up to see if it was heart related and gave her GI cocktail and Bentyl. She is having spasms in her esophagus when she eats or drinks anything. She is still having upper chest pain today. She was advised to follow up with surgeon today. Please advise. Vandana Abarca

## 2019-04-10 ENCOUNTER — TELEPHONE (OUTPATIENT)
Dept: SURGERY | Age: 39
End: 2019-04-10

## 2019-04-10 ENCOUNTER — PATIENT MESSAGE (OUTPATIENT)
Dept: FAMILY MEDICINE CLINIC | Age: 39
End: 2019-04-10

## 2019-04-15 ENCOUNTER — TELEPHONE (OUTPATIENT)
Dept: SURGERY | Age: 39
End: 2019-04-15

## 2019-04-15 NOTE — TELEPHONE ENCOUNTER
Patient had Rasheed Flores done on 3/28/19 and wondering the results from this. Please call her back at [phone].

## 2019-04-17 ENCOUNTER — PATIENT MESSAGE (OUTPATIENT)
Dept: FAMILY MEDICINE CLINIC | Age: 39
End: 2019-04-17

## 2019-04-17 NOTE — TELEPHONE ENCOUNTER
From: Olivia Lindo  To: Catalina Dash, APRN - CNP  Sent: 4/17/2019 4:25 PM EDT  Subject: Visit Follow-Up Question    I'm going to see Dr Angie Resendez Monday to discuss options on my results from the scope and bravo test. I'd like to reschedule Donita Pierre and i's appointment for later maybe when school is out and then that also gives me time to figure out things with Dr Angie Resendez and see if I'm doing better by then on the sinus stuff. Her last day is May 29. So any day off week lunchish time for us both.  Thanks  Olivia Lindo

## 2019-04-22 ENCOUNTER — OFFICE VISIT (OUTPATIENT)
Dept: SURGERY | Age: 39
End: 2019-04-22
Payer: COMMERCIAL

## 2019-04-22 ENCOUNTER — TELEPHONE (OUTPATIENT)
Dept: SURGERY | Age: 39
End: 2019-04-22

## 2019-04-22 VITALS
WEIGHT: 154 LBS | HEIGHT: 63 IN | HEART RATE: 100 BPM | SYSTOLIC BLOOD PRESSURE: 138 MMHG | BODY MASS INDEX: 27.29 KG/M2 | DIASTOLIC BLOOD PRESSURE: 90 MMHG | TEMPERATURE: 99.9 F

## 2019-04-22 DIAGNOSIS — K21.00 GASTROESOPHAGEAL REFLUX DISEASE WITH ESOPHAGITIS: Primary | ICD-10-CM

## 2019-04-22 PROCEDURE — G8417 CALC BMI ABV UP PARAM F/U: HCPCS | Performed by: SURGERY

## 2019-04-22 PROCEDURE — 4004F PT TOBACCO SCREEN RCVD TLK: CPT | Performed by: SURGERY

## 2019-04-22 PROCEDURE — 99213 OFFICE O/P EST LOW 20 MIN: CPT | Performed by: SURGERY

## 2019-04-22 PROCEDURE — G8427 DOCREV CUR MEDS BY ELIG CLIN: HCPCS | Performed by: SURGERY

## 2019-04-22 RX ORDER — ESOMEPRAZOLE MAGNESIUM 40 MG/1
40 CAPSULE, DELAYED RELEASE ORAL 2 TIMES DAILY
Qty: 60 CAPSULE | Refills: 3 | Status: SHIPPED | OUTPATIENT
Start: 2019-04-22 | End: 2019-10-08 | Stop reason: SDUPTHER

## 2019-04-22 NOTE — PATIENT INSTRUCTIONS
Take Nexium in the morning and before evening meal. Take Zantac at bedtime. Gastric emptying study will be done 5/08/19 arrive at 8 am at [facility]. Nothing to eat or drink for 6 hours before. This test may take up to 4 hours. Esophagram is scheduled for 5/14/19 at [facility] with arrival time of 8:45. Nothing to eat or drink for 8 hours prior. This test usually takes an hour.

## 2019-04-22 NOTE — PROGRESS NOTES
Pt is here to discuss her 48 hour bravo. Patient is a 25-year-old female who is had a long history of reflux. She has been on Nexium once a day and Zantac for many years. This does help her heartburn. She doesn't have much heartburn anymore. Ever. She has had a lot of sinus trouble. She's had multiple sinus infections and a lot of postnasal drip. She is seen the allergist as well as an ENT and they feel it is due to GERD. She underwent an EGD  With 48-hour bravo testing. There were no significant abnormalities noted. There was no H. Pylori were no Alvarez's esophagus. There was some minimal inflammation but not significant. There was a very small hiatal hernia. However, she had a significant amount reflux. She had overall DeMeester score of 43. .    Today we had a long discussion about the options. I think they include continued observation and continued treatment that she is on. She doesn't have any significant GERD or heartburn type symptoms and there is no Alvarez's. He concerned about. Therefore, if she is content we can to stay as we are. However, if indeed it's contributing to her laryngeal issues, we may not make that better and less. We do something. I think we could upper treatment to 40 mg of Nexium twice a day and Zantac 10 mg at night  For 3-6 months. That is maximum medical therapy. We also could discuss possible antireflux surgery which I think she is a candidate for. Thorough long discussion, she does not want to schedule surgery. Medially, but she is leaning that way. She's been on medicine long time and says is not helping. So she would like to undergo the testing and then come back and talk about it in 3 months. We will therefore schedule her for the following: A gastric emptying study, an esophagram, and manometry.   We'll then see her back in discuss the results and options

## 2019-05-06 DIAGNOSIS — K21.9 LPRD (LARYNGOPHARYNGEAL REFLUX DISEASE): ICD-10-CM

## 2019-05-06 DIAGNOSIS — K21.9 GASTROESOPHAGEAL REFLUX DISEASE, ESOPHAGITIS PRESENCE NOT SPECIFIED: ICD-10-CM

## 2019-05-06 RX ORDER — RANITIDINE 300 MG/1
TABLET ORAL
Qty: 90 TABLET | Refills: 3 | Status: SHIPPED | OUTPATIENT
Start: 2019-05-06 | End: 2019-09-17 | Stop reason: SDUPTHER

## 2019-05-06 NOTE — TELEPHONE ENCOUNTER
Last Appt:  2/26/2019  Next Appt:   6/3/2019  Med verified in Epic-Fax received from [facility]

## 2019-05-14 ENCOUNTER — HOSPITAL ENCOUNTER (OUTPATIENT)
Dept: GENERAL RADIOLOGY | Age: 39
Discharge: HOME | End: 2019-05-16
Payer: COMMERCIAL

## 2019-05-14 ENCOUNTER — HOSPITAL ENCOUNTER (OUTPATIENT)
Dept: NUCLEAR MEDICINE | Age: 39
Discharge: HOME | End: 2019-05-16
Payer: COMMERCIAL

## 2019-05-14 DIAGNOSIS — K21.00 GASTROESOPHAGEAL REFLUX DISEASE WITH ESOPHAGITIS: ICD-10-CM

## 2019-05-14 PROCEDURE — 74220 X-RAY XM ESOPHAGUS 1CNTRST: CPT

## 2019-05-14 PROCEDURE — A9541 TC99M SULFUR COLLOID: HCPCS | Performed by: SURGERY

## 2019-05-14 PROCEDURE — 78264 GASTRIC EMPTYING IMG STUDY: CPT

## 2019-05-14 PROCEDURE — 2500000003 HC RX 250 WO HCPCS: Performed by: SURGERY

## 2019-05-14 PROCEDURE — 3430000000 HC RX DIAGNOSTIC RADIOPHARMACEUTICAL: Performed by: SURGERY

## 2019-05-14 RX ADMIN — BARIUM SULFATE 140 ML: 980 POWDER, FOR SUSPENSION ORAL at 15:00

## 2019-05-14 RX ADMIN — BARIUM SULFATE 120 ML: 960 POWDER, FOR SUSPENSION ORAL at 15:09

## 2019-05-14 RX ADMIN — Medication 500 MICRO CURIE: at 14:00

## 2019-05-15 ENCOUNTER — HOSPITAL ENCOUNTER (OUTPATIENT)
Dept: PREOP | Age: 39
Discharge: HOME | End: 2019-05-15
Payer: COMMERCIAL

## 2019-05-15 VITALS
TEMPERATURE: 98.5 F | HEART RATE: 89 BPM | OXYGEN SATURATION: 97 % | RESPIRATION RATE: 16 BRPM | DIASTOLIC BLOOD PRESSURE: 108 MMHG | SYSTOLIC BLOOD PRESSURE: 158 MMHG

## 2019-05-15 PROCEDURE — 6370000000 HC RX 637 (ALT 250 FOR IP): Performed by: SURGERY

## 2019-05-15 PROCEDURE — 3609015500 HC GASTRIC/DUODENAL MOTILITY &/OR MANOMETRY STUDY

## 2019-05-15 RX ORDER — LIDOCAINE HYDROCHLORIDE 20 MG/ML
15 SOLUTION OROPHARYNGEAL
Status: DISCONTINUED | OUTPATIENT
Start: 2019-05-15 | End: 2019-05-16 | Stop reason: HOSPADM

## 2019-05-15 RX ADMIN — LIDOCAINE HYDROCHLORIDE 15 ML: 20 SOLUTION ORAL; TOPICAL at 08:03

## 2019-05-15 NOTE — PROGRESS NOTES
Right nare prepped with 1 mL of viscous lidocaine. Nare intubated to 51 cm with manometry probe. Manometry study completed and probe removed. Pt tolerated well. Discharged home with instructions to wait 30 minutes to eat or drink. Pt verbalized understanding.

## 2019-05-16 ENCOUNTER — CARE COORDINATION (OUTPATIENT)
Dept: CARE COORDINATION | Age: 39
End: 2019-05-16

## 2019-05-16 NOTE — LETTER
5/16/2019    Emily León  [address]  [address] [facility] [address]      Dear Екатерина Arciniega,    My name is Greg Tay and I am a registered nurse who partners with Nahomy Galo DO to improve patients' health. Nahomy Galo DO believes you would benefit from working with me. As a member of your health care team, I would work with other providers involved in your care, offer education for your specific health conditions, and connect you with additional resources as needed. I will collaborate with Nahomy Galo DO to support you in following your treatment plan. The additional support I provide is no additional cost to you. My primary focus is to help you achieve specific goals and improve your health. We are committed to walk with you on this journey and look forward to working with you. Please call me to further discuss your healthcare needs. I am available by phone or for appointments at the office. You can reach me at [phone].     In good health,     Greg Tay RN

## 2019-05-16 NOTE — CARE COORDINATION
Emily was referred for [facility]. She has Type II Diabetes not on insulin- uncontrolled. She has been having testing done under surgeon for her GERD. Plan of Care : Enroll in [facility]    Unable to reach by phone- LM requesting return call. Letter mailed.      Lab Results   Component Value Date    LABA1C 9.3 (H) 02/23/2019    LABA1C 8.5 (H) 10/20/2018    LABA1C 9.1 (H) 06/08/2018     Lab Results   Component Value Date     02/23/2019     10/20/2018     06/08/2018       Future Appointments   Date Time Provider Bella Perez   6/3/2019  1:20 PM DEAN Garrett - CNP [facility]   7/9/2019  1:30 PM Jocelyne Cuevas MD [address] [place] [address]   7/29/2019  1:20 PM Jennifer Garcia DO [facility]

## 2019-05-22 ENCOUNTER — CARE COORDINATION (OUTPATIENT)
Dept: CARE COORDINATION | Age: 39
End: 2019-05-22

## 2019-05-24 DIAGNOSIS — E11.9 TYPE 2 DIABETES MELLITUS WITHOUT COMPLICATION, WITHOUT LONG-TERM CURRENT USE OF INSULIN (HCC): ICD-10-CM

## 2019-05-24 RX ORDER — GLIMEPIRIDE 1 MG/1
1 TABLET ORAL EVERY MORNING
Qty: 30 TABLET | Refills: 1 | Status: SHIPPED | OUTPATIENT
Start: 2019-05-24 | End: 2019-07-24 | Stop reason: SDUPTHER

## 2019-05-29 ENCOUNTER — CARE COORDINATION (OUTPATIENT)
Dept: CARE COORDINATION | Age: 39
End: 2019-05-29

## 2019-05-29 NOTE — CARE COORDINATION
Ambulatory Care Coordination Note  5/29/2019  CM Risk Score: 6  Anthony Mortality Risk Score:      ACC: Cj Solorzano RN    Summary Note: Emily was referred for [facility]. She has Type II Diabetes not on insulin- uncontrolled. She has been having testing done under surgeon for her GERD.      Plan of Care : F/U on literature mailed- Plate Method, BS log sheet, DM Zone Tool, Hgb A1C literature with complications of Diabetes    F/U on BS readings    F/U on medication- Amaryl 1 mg she is to take with lunch    F/U on exercise- she reported that she used to run                          Continue assessments, education, and support.      Spoke with Emily. Assessment completed. Reviewed and mailed literature on hypoglycemia (she voiced correct treatment through teach back), Plate Method, DM Zone Tool, Hgb C9B and DM complications, and BS target ranges. She stated she would test BS daily at varying times. Currently she has been testing prn and only when \"feels it may be high or low\". She admitted to needing to get back in habit of taking Amaryl with lunch. She stated she will resume. She stated she had testing done on her GERD and just got out of the habit of taking. She voiced interest in weekly injection. She stated PCP previously discussed. Her questions were answered. Medications reviewed and MAR updated. Care Team updated. Lab Results   Component Value Date    LABA1C 9.3 (H) 02/23/2019    LABA1C 8.5 (H) 10/20/2018    LABA1C 9.1 (H) 06/08/2018     Lab Results   Component Value Date     02/23/2019     10/20/2018     06/08/2018     General Assessment    Do you have any symptoms that are causing concern?:  No       Diabetes Assessment    Medic Alert ID:  No  Meal Planning:  Avoidance of concentrated sweets   How often do you test your blood sugar?:  Daily   Do you have barriers with adherence to non-pharmacologic self-management interventions?  (Nutrition/Exercise/Self-Monitoring):  Yes   Have you ever had to go to the ED for symptoms of low blood sugar?:  No       No patient-reported symptoms   Do you have hyperglycemia symptoms?:  No   Do you have hypoglycemia symptoms?:  No   Last Blood Sugar Value:  180   Blood Sugar Monitoring Regimen:  Not Testing          Ambulatory Care Coordination Assessment    Care Coordination Protocol  Program Enrollment:  Complex Care  Referral from Primary Care Provider:  No  Week 1 - Initial Assessment     Do you have all of your prescriptions and are they filled?:  Yes  Barriers to medication adherence:  None  Are you able to afford your medications?:  Yes  How often do you have trouble taking your medications the way you have been told to take them?:  Sometimes I take them as prescribed. Do you have Home O2 Therapy?:  No      Ability to seek help/take action for Emergent Urgent situations i.e. fire, crime, inclement weather or health crisis. :  Independent  Ability to ambulate to restroom:  Independent  Ability handle personal hygeine needs (bathing/dressing/grooming): Independent  Ability to manage Medications: Independent  Ability to prepare Food Preparation:  Independent  Ability to maintain home (clean home, laundry): Independent  Ability to drive and/or has transportation:  Independent  Ability to do shopping:  Independent  Ability to manage finances:   Independent  Is patient able to live independently?:  Yes     Current Housing:  Private Residence        Per the Fall Risk Screening, did the patient have 2 or more falls or 1 fall with injury in the past year?:  No     Frequent urination at night?:  No  Do you use rails/bars?:  No  Do you have a non-slip tub mat?:  Yes     Are you experiencing loss of meaning?:  No  Are you experiencing loss of hope and peace?:  No     Thinking about your patient's physical health needs, are there any symptoms or problems (risk indicators) you are unsure about that require further investigation?:  No identified areas of uncertainly or problems already being investigated   Are the patients physical health problems impacting on their mental well-being?:  No identified areas of concern   Are there any problems with your patients lifestyle behaviors (alcohol, drugs, diet, exercise) that are impacting on physical or mental well-being?:  No identified areas of concern   Do you have any other concerns about your patients mental well-being? How would you rate their severity and impact on the patient?:  No identified areas of concern   How would you rate their home environment in terms of safety and stability (including domestic violence, insecure housing, neighbor harassment)?:  Consistently safe, supportive, stable, no identified problems   How do daily activities impact on the patient's well-being? (include current or anticipated unemployment, work, caregiving, access to transportation or other):  No identified problems or perceived positive benefits   How would you rate their social network (family, work, friends)?:  Good participation with social networks   How would you rate their financial resources (including ability to afford all required medical care)?:  Financially secure, resources adequate, no identified problems   How wells does the patient now understand their health and well-being (symptoms, signs or risk factors) and what they need to do to manage their health?:  Reasonable to good understanding and already engages in managing health or is willing to undertake better management   How well do you think your patient can engage in healthcare discussions? (Barriers include language, deafness, aphasia, alcohol or drug problems, learning difficulties, concentration):  Clear and open communication, no identified barriers   Do other services need to be involved to help this patient?:  Other care/services not required at this time   Are current services involved with this patient well-coordinated?  (Include coordination with other services you are now recommendation): All required care/services in place and well-coordinated   Suggested Interventions and Community Resources  Diabetes Education: In Process    Zone Management Tools: In Process         Set up/Review Goals, Set up/Review an Education Plan              Prior to Admission medications    Medication Sig Start Date End Date Taking? Authorizing Provider   Norgestim-Eth Estrad Triphasic (NORGESTIMATE-ETHINYL ESTRADIOL PO) Take 1 tablet by mouth daily   Yes Historical Provider, MD   glimepiride (AMARYL) 1 MG tablet take 1 tablet by mouth every morning 5/24/19  Yes VALERIA Weller   ranitidine (ZANTAC) 300 MG tablet take 1 tablet by mouth at bedtime 5/6/19  Yes DEAN Fairbanks CNP   LORazepam (ATIVAN) 0.5 MG tablet Take 0.5 mg by mouth every 6 hours as needed for Anxiety. Yes Historical Provider, MD   dicyclomine (BENTYL) 10 MG capsule Take 2 capsules by mouth every 6 hours as needed (cramps) 3/31/19  Yes Patricio Arango MD   albuterol sulfate  (90 Base) MCG/ACT inhaler Inhale 2 puffs into the lungs 4 times daily as needed for Wheezing 3/18/19  Yes DEAN Boyd CNP   fexofenadine (ALLEGRA ALLERGY) 180 MG tablet Take 1 tablet by mouth daily 11/29/18  Yes Ollie Eason,    esomeprazole (NEXIUM) 20 MG delayed release capsule Take 2 capsules once daily 30-60 minutes prior to a meal  Patient taking differently: Take 20 mg by mouth every morning (before breakfast) Take 2 capsules once daily 30-60 minutes prior to a meal 10/29/18  Yes DEAN Fairbanks CNP   Lactobacillus (RA ACIDOPHILUS) 300 MG CAPS take 1 capsule by mouth once daily 10/29/18  Yes Ollie Eason DO   fluticasone (FLONASE) 50 MCG/ACT nasal spray 2 sprays to each nostril once daily.  3/27/18  Yes DEAN Fairbanks CNP   esomeprazole (NEXIUM) 40 MG delayed release capsule Take 1 capsule by mouth 2 times daily 4/22/19   Josh Cabrera MD   azelastine (ASTELIN) 0.1 % nasal spray 1 spray by Nasal route 2 times daily Use in each nostril as directed 2/7/19   Alexander Castorena MD   glucose monitoring kit (FREESTYLE) monitoring kit 1 kit by Does not apply route daily 6/22/18   Gray Shelley DO   Lancets MISC Use to check glucose 3x's weekly. 6/22/18   Fred Eason DO   Glucose Blood (BLOOD GLUCOSE TEST STRIPS) STRP Use to check glucose 3x's weekly.  6/22/18   Gray Shelley DO       Future Appointments   Date Time Provider Bella Mccartneyisti   6/11/2019  1:20 PM DEAN Fuller - CNP [facility]   7/9/2019  1:30 PM Krish Monae MD [address] [place] [address]   7/29/2019  1:20 PM Gray Shelley DO [facility]

## 2019-06-04 ENCOUNTER — CARE COORDINATION (OUTPATIENT)
Dept: CARE COORDINATION | Age: 39
End: 2019-06-04

## 2019-06-04 NOTE — CARE COORDINATION
Ambulatory Care Coordination Note  6/4/2019  CM Risk Score: 6  Anthony Mortality Risk Score:      ACC: Shadi [facility] RN    Summary Note: Emily was referred for [facility]. She has Type II Diabetes not on insulin- uncontrolled. She has been having testing done under surgeon for her GERD.      Plan of Care : F/U on literature mailed- Plate Method, BS log sheet, DM Zone Tool, Hgb A1C literature with complications of Diabetes                          F/U on BS readings                          F/U on medication- Amaryl 1 mg she is to take with lunch                          F/U on exercise- she reported that she used to run                          Continue assessments, education, and support    5:24 PM- Unable to reach by phone- LM requesting return call. 6/10/2019- 5:32 pm LM requesting return call. Care Coordination Interventions    Program Enrollment:  Complex Care  Referral from Primary Care Provider:  No  Suggested Interventions and Community Resources  Diabetes Education: In Process  Zone Management Tools:  Completed (Comment: DM)           Prior to Admission medications    Medication Sig Start Date End Date Taking? Authorizing Provider   Norgestim-Eth Estrad Triphasic (NORGESTIMATE-ETHINYL ESTRADIOL PO) Take 1 tablet by mouth daily    Historical Provider, MD   glimepiride (AMARYL) 1 MG tablet take 1 tablet by mouth every morning 5/24/19   VALERIA Moody   ranitidine (ZANTAC) 300 MG tablet take 1 tablet by mouth at bedtime 5/6/19   DEAN Fuller - CNP   esomeprazole (NEXIUM) 40 MG delayed release capsule Take 1 capsule by mouth 2 times daily 4/22/19   Krish Monae MD   LORazepam (ATIVAN) 0.5 MG tablet Take 0.5 mg by mouth every 6 hours as needed for Anxiety.     Historical Provider, MD   dicyclomine (BENTYL) 10 MG capsule Take 2 capsules by mouth every 6 hours as needed (cramps) 3/31/19   Silva Vick MD   albuterol sulfate  (90 Base) MCG/ACT inhaler Inhale 2 puffs into the lungs 4 times daily as needed for Wheezing 3/18/19   DEAN Boyd CNP   azelastine (ASTELIN) 0.1 % nasal spray 1 spray by Nasal route 2 times daily Use in each nostril as directed 2/7/19   Gi Acosta MD   fexofenadine [facility] ALLERGY) 180 MG tablet Take 1 tablet by mouth daily 11/29/18   Brad Eason DO   esomeprazole (NEXIUM) 20 MG delayed release capsule Take 2 capsules once daily 30-60 minutes prior to a meal  Patient taking differently: Take 20 mg by mouth every morning (before breakfast) Take 2 capsules once daily 30-60 minutes prior to a meal 10/29/18   DEAN Devine CNP   Lactobacillus (RA ACIDOPHILUS) 300 MG CAPS take 1 capsule by mouth once daily 10/29/18   Brad Eason,    glucose monitoring kit (FREESTYLE) monitoring kit 1 kit by Does not apply route daily 6/22/18   Brad Eason DO   Lancets MISC Use to check glucose 3x's weekly. 6/22/18   Brad Eason, DO   Glucose Blood (BLOOD GLUCOSE TEST STRIPS) STRP Use to check glucose 3x's weekly. 6/22/18   Brad Eason, DO   fluticasone (FLONASE) 50 MCG/ACT nasal spray 2 sprays to each nostril once daily.  3/27/18   DEAN Devine CNP       Future Appointments   Date Time Provider Bella Perez   6/11/2019  1:20 PM DEAN Devine CNP [facility]   7/9/2019  1:30 PM Anibal Persaud MD [facility]   7/29/2019  1:20 PM Brianne Pablo DO [facility]

## 2019-06-11 ENCOUNTER — OFFICE VISIT (OUTPATIENT)
Dept: FAMILY MEDICINE CLINIC | Age: 39
End: 2019-06-11
Payer: COMMERCIAL

## 2019-06-11 ENCOUNTER — CARE COORDINATION (OUTPATIENT)
Dept: CARE COORDINATION | Age: 39
End: 2019-06-11

## 2019-06-11 VITALS
DIASTOLIC BLOOD PRESSURE: 72 MMHG | BODY MASS INDEX: 26.58 KG/M2 | HEART RATE: 96 BPM | HEIGHT: 63 IN | WEIGHT: 150 LBS | SYSTOLIC BLOOD PRESSURE: 116 MMHG

## 2019-06-11 DIAGNOSIS — K21.9 GASTROESOPHAGEAL REFLUX DISEASE, ESOPHAGITIS PRESENCE NOT SPECIFIED: ICD-10-CM

## 2019-06-11 DIAGNOSIS — Z72.0 TOBACCO USE: ICD-10-CM

## 2019-06-11 DIAGNOSIS — J34.9 SINUS PROBLEM: ICD-10-CM

## 2019-06-11 DIAGNOSIS — J30.1 SEASONAL ALLERGIC RHINITIS DUE TO POLLEN: ICD-10-CM

## 2019-06-11 DIAGNOSIS — E11.9 TYPE 2 DIABETES MELLITUS WITHOUT COMPLICATION, WITHOUT LONG-TERM CURRENT USE OF INSULIN (HCC): Primary | ICD-10-CM

## 2019-06-11 DIAGNOSIS — K21.9 LPRD (LARYNGOPHARYNGEAL REFLUX DISEASE): ICD-10-CM

## 2019-06-11 PROCEDURE — 99213 OFFICE O/P EST LOW 20 MIN: CPT | Performed by: NURSE PRACTITIONER

## 2019-06-11 NOTE — PROGRESS NOTES
Subjective:      Patient ID: Prakash Sahu is a 35 y.o. female. HPI Presents for follow up of reflux, sinus problems and allergic rhinitis. Reflux  Somewhat controlled with Nexium and ranitidine. She is in the process of completing more testing to determine the best course of treatment. Sinus problems  Doing sinus rinses as needed. Using flonase daily. No concerns as present. Allergic rhinitis  Controlled with flonase, astelin and fexofenadine. She has been on allergy shots in the past but is unable to commit to doing them again. She is happy with her current medications. Also discussed diabetes per request of care coordinator. Emily reports she is checking her blood sugar but not on a regular schedule. She tried glimepiride 1 mg but felt she was having low blood sugar. She tried taking 1/2 tablet which has helped somewhat but she still is not taking it regularly. Suggested seeing a diabetes educator which she is willing to try. Review of Systems   See symptom questionnaires and history as above. All other systems reviewed and are negative as pertaining to this appointment. Grade in school/occupation: [employer]  Patient lives with:  and children    Objective:   Physical Exam     Constitutional  Appears stated age. Head normocephalic and atraumatic. Psych  Alert and oriented. Pleasant and cooperative. Chest  Rises and falls symmetrically with no evidence of respiratory distress. Lungs  0/40 - no wheeze or other adventitious breath sounds    Nose  Pallor: 2-Healthy/Pink    Bogginess: 2-turbinate occupies less than or equal to 50% of the diameter of naris    Wetness: 2-scant secretions    Redness: 2-healthy pink    Mouth  Pharynx clear, uvula midline, dentition WDL. Ears  Ear canals WDL, TM's nisreen grey with visible light reflex. Eyes  Pupils equal and reactive. EOM's WDL. Heart  Heart rate regular. Rhythm without murmur, click, rub or gallop.
in your throat 0  Heartburn, chest pain, indigestion, or stomach acid coming up 3    Total: 18

## 2019-06-11 NOTE — CARE COORDINATION
Ambulatory Care Coordination Note  6/11/2019  CM Risk Score: 6  Anthony Mortality Risk Score:      ACC: Tatyana Hicsk RN    Summary Note:  Emily was referred for [facility]. She has Type II Diabetes not on insulin- uncontrolled. She has been having testing done under surgeon for her GERD.      Plan of Care : F/U on literature mailed- Plate Method, BS log sheet, DM Zone Tool, Hgb A1C literature with complications of Diabetes                          F/U on BS readings- F/U on ref to Asif Smith CNP.                         F/U on medication- Amaryl 1 mg she is to take with lunch                          F/U on exercise- she reported that she used to run  [employer] assessments, education, and support    Spoke with Carmen Andersen CNP prior to Emily's appt. Met with Emily while here to see [facility]. She stated that she received my messages but she did not have any BS to report. She has not tested BS since our last converstaion. This writer's business card given. [facility] referred to Asif Smith CNP for Emily's diabetic management. Lab Results   Component Value Date    LABA1C 9.3 (H) 02/23/2019    LABA1C 8.5 (H) 10/20/2018    LABA1C 9.1 (H) 06/08/2018     Lab Results   Component Value Date     02/23/2019     10/20/2018     06/08/2018        General Assessment    Do you have any symptoms that are causing concern?:  No       Diabetes Assessment    Medic Alert ID:  No  Meal Planning:  Avoidance of concentrated sweets   How often do you test your blood sugar?:  No Testing   Do you have barriers with adherence to non-pharmacologic self-management interventions?  (Nutrition/Exercise/Self-Monitoring):  Yes   Have you ever had to go to the ED for symptoms of low blood sugar?:  No       No patient-reported symptoms   Do you have hyperglycemia symptoms?:  No   Do you have hypoglycemia symptoms?:  No   Blood Sugar Monitoring Regimen:  Not Testing          Care Coordination Interventions    Program Enrollment:  Complex Care  Referral from Primary Care Provider:  No  Suggested Interventions and Community Resources  Diabetes Education: In Process  Zone Management Tools:  Completed (Comment: DM)         Goals Addressed                 This Visit's Progress       Patient Stated     Self Monitoring (pt-stated)   Worsening     Self-Monitored Blood Glucose - I will check my blood sugar Other: once a day at varying times and prn  I will notify my provider of any trends of increasing or decreasing blood sugars over a 1 month period. I will notify my provider if I have any blood sugar readings less than 70 more than 2 times a month. None Recently Recorded    Barriers: lack of support and lack of education  Plan for overcoming my barriers: Care Coordination Intervention  Confidence: 8/10  Anticipated Goal Completion Date: 8/29/2019 6/11/2019 Emily has not been testing. She was referred to Shikha Centeno CNP for her diabetes management. Prior to Admission medications    Medication Sig Start Date End Date Taking? Authorizing Provider   Norgestim-Eth Estrad Triphasic (NORGESTIMATE-ETHINYL ESTRADIOL PO) Take 1 tablet by mouth daily    Historical Provider, MD   glimepiride (AMARYL) 1 MG tablet take 1 tablet by mouth every morning 5/24/19   VALERIA Mabry   ranitidine (ZANTAC) 300 MG tablet take 1 tablet by mouth at bedtime 5/6/19   DEAN Zhang CNP   esomeprazole (NEXIUM) 40 MG delayed release capsule Take 1 capsule by mouth 2 times daily 4/22/19   Addi Hester MD   LORazepam (ATIVAN) 0.5 MG tablet Take 0.5 mg by mouth every 6 hours as needed for Anxiety.     Historical Provider, MD   dicyclomine (BENTYL) 10 MG capsule Take 2 capsules by mouth every 6 hours as needed (cramps) 3/31/19   Emmett Escobar MD   albuterol sulfate  (90 Base) MCG/ACT inhaler Inhale 2 puffs into the lungs 4 times daily as needed for Wheezing 3/18/19   DEAN Boyd CNP   azelastine (ASTELIN) 0.1 % nasal spray 1 spray by Nasal route 2 times daily Use in each nostril as directed 2/7/19   Ratna Feliciano MD   fexofenadine [facility] ALLERGY) 180 MG tablet Take 1 tablet by mouth daily 11/29/18   Ricardo Eason DO   Lactobacillus (RA ACIDOPHILUS) 300 MG CAPS take 1 capsule by mouth once daily 10/29/18   Ricardo Eason DO   glucose monitoring kit (FREESTYLE) monitoring kit 1 kit by Does not apply route daily 6/22/18   Jaida Sena DO   Lancets MISC Use to check glucose 3x's weekly. 6/22/18   Ricardo Eason DO   Glucose Blood (BLOOD GLUCOSE TEST STRIPS) STRP Use to check glucose 3x's weekly. 6/22/18   Ricardo Eason DO   fluticasone (FLONASE) 50 MCG/ACT nasal spray 2 sprays to each nostril once daily.  3/27/18   DEAN Galvan CNP       Future Appointments   Date Time Provider [facility]   7/9/2019  1:30 PM Justina Segal MD [address] [place] [address]   7/10/2019  1:00 PM DEAN Sauceda CNP [facility]   7/29/2019  1:20 PM Jaida Sena DO [facility]   12/11/2019  1:20 PM DEAN Galvan CNP [facility]

## 2019-06-19 PROBLEM — J34.9 SINUS PROBLEM: Status: ACTIVE | Noted: 2019-06-19

## 2019-06-19 PROBLEM — K21.9 LPRD (LARYNGOPHARYNGEAL REFLUX DISEASE): Status: ACTIVE | Noted: 2019-06-19

## 2019-06-19 PROBLEM — J30.1 SEASONAL ALLERGIC RHINITIS DUE TO POLLEN: Status: ACTIVE | Noted: 2019-06-19

## 2019-06-20 ENCOUNTER — CARE COORDINATION (OUTPATIENT)
Dept: CARE COORDINATION | Age: 39
End: 2019-06-20

## 2019-07-01 DIAGNOSIS — M85.649 BONE CYST OF HAND: Primary | ICD-10-CM

## 2019-07-03 ENCOUNTER — CARE COORDINATION (OUTPATIENT)
Dept: CARE COORDINATION | Age: 39
End: 2019-07-03

## 2019-07-03 NOTE — CARE COORDINATION
Ambulatory Care Coordination Note  7/3/2019  CM Risk Score: 6  Charlson 10 Year Mortality Risk Score: 4%     ACC: Sathya Pappas, RN    Summary Note: Emily was referred for [facility]. She has Type II Diabetes not on insulin- uncontrolled. She is following with surgeon and ortho (bone cyst hand). She has been having testing done under surgeon for her GERD.      Plan of Care : F/U on BS readings- F/U on ref to Vianey Medina CNP. Appt 7/22/2019 (was 7/10 and Patient rescheduled).                         F/U on medication- Amaryl 1 mg she is to take with lunch                          F/U on exercise- she reported that she used to run  [employer] assessments, education, and support    Spoke with Emily. She stated she has not been monitoring BS nor taking Amaryl. \"I have been really bad\" \"I am thinking I would like to try the once a week injection\" \"I thought I would discuss with Venus or Dr. Shelli Darby". Discussed need to have BS readings for providers to review. She voiced understanding and in agreement to monitor once a day at varying times. BS log sheet mailed. She is aware to bring to appt with Venus.     General Assessment    Do you have any symptoms that are causing concern?:  No       Diabetes Assessment    Medic Alert ID:  No  Meal Planning:  Avoidance of concentrated sweets   How often do you test your blood sugar?:  No Testing   Do you have barriers with adherence to non-pharmacologic self-management interventions?  (Nutrition/Exercise/Self-Monitoring):  Yes   Have you ever had to go to the ED for symptoms of low blood sugar?:  No       No patient-reported symptoms   Do you have hyperglycemia symptoms?:  No   Do you have hypoglycemia symptoms?:  No   Blood Sugar Monitoring Regimen:  Not Testing        Care Coordination Interventions    Program Enrollment:  Complex Care  Referral from Primary Care Provider:  No  Suggested Interventions and Community Resources  Diabetes Education:  Completed CNP   azelastine (ASTELIN) 0.1 % nasal spray 1 spray by Nasal route 2 times daily Use in each nostril as directed 2/7/19   Jane Joaquin MD   fexofenadine TY [facility] ALLERGY) 180 MG tablet Take 1 tablet by mouth daily 11/29/18   Lorrie Eason DO   Lactobacillus (RA ACIDOPHILUS) 300 MG CAPS take 1 capsule by mouth once daily 10/29/18   Lorrie Eason, DO   glucose monitoring kit (FREESTYLE) monitoring kit 1 kit by Does not apply route daily 6/22/18   Kelvin Stephenson,    Lancets MISC Use to check glucose 3x's weekly. 6/22/18   Lorrie Eason DO   Glucose Blood (BLOOD GLUCOSE TEST STRIPS) STRP Use to check glucose 3x's weekly. 6/22/18   Lorrie Eason DO   fluticasone (FLONASE) 50 MCG/ACT nasal spray 2 sprays to each nostril once daily.  3/27/18   DEAN Bojorquez CNP       Future Appointments   Date Time Provider Bella Gustafsoni   7/9/2019  9:30 AM Kayla Melo MD [facility]   7/9/2019  1:30 PM Yoly Chambers MD DS[facility]G [facility]   7/18/2019  1:15 PM AYANA RUSS [facility]   7/22/2019  1:00 PM DEAN Hairston CNP DDIAED [facility]   7/29/2019  1:20 PM [facility]   12/11/2019  1:20 PM DEAN Bojorquez CNP [facility]

## 2019-07-09 ENCOUNTER — OFFICE VISIT (OUTPATIENT)
Dept: ORTHOPEDIC SURGERY | Age: 39
End: 2019-07-09
Payer: COMMERCIAL

## 2019-07-09 ENCOUNTER — HOSPITAL ENCOUNTER (OUTPATIENT)
Dept: GENERAL RADIOLOGY | Age: 39
Discharge: HOME | End: 2019-07-11
Payer: COMMERCIAL

## 2019-07-09 ENCOUNTER — OFFICE VISIT (OUTPATIENT)
Dept: SURGERY | Age: 39
End: 2019-07-09
Payer: COMMERCIAL

## 2019-07-09 VITALS
SYSTOLIC BLOOD PRESSURE: 116 MMHG | OXYGEN SATURATION: 98 % | HEIGHT: 63 IN | BODY MASS INDEX: 26.58 KG/M2 | WEIGHT: 150 LBS | HEART RATE: 84 BPM | DIASTOLIC BLOOD PRESSURE: 76 MMHG

## 2019-07-09 VITALS
HEART RATE: 72 BPM | DIASTOLIC BLOOD PRESSURE: 110 MMHG | BODY MASS INDEX: 26.4 KG/M2 | HEIGHT: 63 IN | WEIGHT: 149 LBS | SYSTOLIC BLOOD PRESSURE: 138 MMHG

## 2019-07-09 DIAGNOSIS — D36.12 NEUROMA OF HAND: Primary | ICD-10-CM

## 2019-07-09 DIAGNOSIS — M85.649 BONE CYST OF HAND: ICD-10-CM

## 2019-07-09 DIAGNOSIS — K21.00 GASTROESOPHAGEAL REFLUX DISEASE WITH ESOPHAGITIS: Primary | ICD-10-CM

## 2019-07-09 PROCEDURE — 73130 X-RAY EXAM OF HAND: CPT

## 2019-07-09 PROCEDURE — 99213 OFFICE O/P EST LOW 20 MIN: CPT | Performed by: SURGERY

## 2019-07-09 PROCEDURE — 99202 OFFICE O/P NEW SF 15 MIN: CPT | Performed by: NURSE PRACTITIONER

## 2019-07-09 RX ORDER — METHYLPREDNISOLONE 4 MG/1
TABLET ORAL
Qty: 1 KIT | Refills: 0 | Status: SHIPPED | OUTPATIENT
Start: 2019-07-09 | End: 2019-07-15

## 2019-07-09 RX ORDER — DICYCLOMINE HCL 20 MG
20 TABLET ORAL EVERY 6 HOURS PRN
Qty: 60 TABLET | Refills: 2 | Status: SHIPPED | OUTPATIENT
Start: 2019-07-09 | End: 2020-01-09 | Stop reason: SDUPTHER

## 2019-07-09 NOTE — PATIENT INSTRUCTIONS
Continue Nexium twice a day and zantac at night. Follow up with  in 6 months. Check what ENT doc is in network for your insurance. Call [place] at [phone].

## 2019-07-09 NOTE — PROGRESS NOTES
Patient is here to follow-up her GERD. She has had GERD and been treated with Zantac over a long period of time. She has been having a lot of trouble with sinusitis and a lot of sinus drainage over the last year or 2. She has been seen by allergist and by ENT. The allergist thought it was definitely due to GERD. The ENT did not really say according to the patient. She was sent to us to rule out GERD. We increased her medicine to maximum medical therapy to to Nexium as a day and Zantac at night to see if that would have any benefit. She had a manometry and 48-hour Bravo testing. The Bravo testing showed a DeMeester score of 48. It was consistent with significant reflux. Her gastric emptying study showed some rapid emptying but no delay her esophagram was reasonable with some tertiary contractions but no significantly large hiatal hernia    Therefore it does look like she is probably a candidate for antireflux surgery. However the issue is that it is not typical GERD symptoms. She has atypical GERD symptoms such as sinusitis. I think this is more difficult to give her a estimate whether this will definitely help or not help. At this point I would like to keep her on maximum medical therapy for another 6 months. I think sometimes the injury from the long-term GERD takes more than just 3 months to undo. It may take a while for the sinuses to calm down. Also there are other contents in the gastric juice that may be irritating her and acid medicine may not help. Therefore she may have the same symptoms even in 6 months. I would feel more comfortable getting an another ENT opinion since she was not happy with the last one. If they feel that this is GERD related then I think it is more compelling to consider surgery. She is leaning toward surgery but not at this point. She agrees she wants to take it slow and see how she feels in 6 months. Plan we will see her back in 6 months.   We will

## 2019-07-22 ENCOUNTER — CARE COORDINATION (OUTPATIENT)
Dept: CARE COORDINATION | Age: 39
End: 2019-07-22

## 2019-07-24 DIAGNOSIS — E11.9 TYPE 2 DIABETES MELLITUS WITHOUT COMPLICATION, WITHOUT LONG-TERM CURRENT USE OF INSULIN (HCC): ICD-10-CM

## 2019-07-24 RX ORDER — GLIMEPIRIDE 1 MG/1
1 TABLET ORAL EVERY MORNING
Qty: 30 TABLET | Refills: 0 | Status: SHIPPED | OUTPATIENT
Start: 2019-07-24 | End: 2019-07-29

## 2019-07-24 NOTE — TELEPHONE ENCOUNTER
Elder Ros is requesting a refill on the following medication(s):  Requested Prescriptions     Pending Prescriptions Disp Refills    glimepiride (AMARYL) 1 MG tablet [Pharmacy Med Name: GLIMEPIRIDE 1 MG TABLET] 30 tablet 1     Sig: take 1 tablet by mouth every morning       Last Visit Date (If Applicable):  0/29/1580    Next Visit Date:    7/29/2019

## 2019-07-29 ENCOUNTER — OFFICE VISIT (OUTPATIENT)
Dept: FAMILY MEDICINE CLINIC | Age: 39
End: 2019-07-29
Payer: COMMERCIAL

## 2019-07-29 ENCOUNTER — HOSPITAL ENCOUNTER (OUTPATIENT)
Dept: LAB | Age: 39
Discharge: HOME | End: 2019-07-29
Payer: COMMERCIAL

## 2019-07-29 ENCOUNTER — CARE COORDINATION (OUTPATIENT)
Dept: CARE COORDINATION | Age: 39
End: 2019-07-29

## 2019-07-29 VITALS
HEART RATE: 63 BPM | WEIGHT: 146 LBS | HEIGHT: 63 IN | OXYGEN SATURATION: 97 % | BODY MASS INDEX: 25.87 KG/M2 | DIASTOLIC BLOOD PRESSURE: 72 MMHG | SYSTOLIC BLOOD PRESSURE: 124 MMHG

## 2019-07-29 DIAGNOSIS — R42 DIZZINESS: ICD-10-CM

## 2019-07-29 DIAGNOSIS — B37.31 RECURRENT CANDIDIASIS OF VAGINA: ICD-10-CM

## 2019-07-29 DIAGNOSIS — H69.83 EUSTACHIAN TUBE DYSFUNCTION, BILATERAL: ICD-10-CM

## 2019-07-29 DIAGNOSIS — E78.5 HYPERLIPIDEMIA, UNSPECIFIED HYPERLIPIDEMIA TYPE: ICD-10-CM

## 2019-07-29 DIAGNOSIS — E11.9 TYPE 2 DIABETES MELLITUS WITHOUT COMPLICATION, WITHOUT LONG-TERM CURRENT USE OF INSULIN (HCC): Primary | ICD-10-CM

## 2019-07-29 DIAGNOSIS — E11.9 TYPE 2 DIABETES MELLITUS WITHOUT COMPLICATION, WITHOUT LONG-TERM CURRENT USE OF INSULIN (HCC): ICD-10-CM

## 2019-07-29 DIAGNOSIS — R09.89 CHRONIC SINUS COMPLAINTS: ICD-10-CM

## 2019-07-29 LAB
ALBUMIN SERPL-MCNC: 4.8 G/DL (ref 3.5–5.2)
ALBUMIN/GLOBULIN RATIO: 1.5 (ref 1–2.5)
ALP BLD-CCNC: 59 U/L (ref 35–104)
ALT SERPL-CCNC: 56 U/L (ref 5–33)
AST SERPL-CCNC: 35 U/L
BILIRUB SERPL-MCNC: 0.23 MG/DL (ref 0.3–1.2)
BILIRUBIN DIRECT: 0.09 MG/DL
BILIRUBIN, INDIRECT: 0.14 MG/DL (ref 0–1)
ESTIMATED AVERAGE GLUCOSE: 232 MG/DL
GLOBULIN: 3.2 G/DL (ref 1.5–3.8)
HBA1C MFR BLD: 9.7 % (ref 4.8–5.9)
TOTAL PROTEIN: 8 G/DL (ref 6.4–8.3)

## 2019-07-29 PROCEDURE — 99214 OFFICE O/P EST MOD 30 MIN: CPT | Performed by: FAMILY MEDICINE

## 2019-07-29 PROCEDURE — 80076 HEPATIC FUNCTION PANEL: CPT

## 2019-07-29 PROCEDURE — 36415 COLL VENOUS BLD VENIPUNCTURE: CPT

## 2019-07-29 PROCEDURE — 83036 HEMOGLOBIN GLYCOSYLATED A1C: CPT

## 2019-07-29 RX ORDER — FLUCONAZOLE 150 MG/1
TABLET ORAL
Qty: 2 TABLET | Refills: 5 | Status: SHIPPED | OUTPATIENT
Start: 2019-07-29 | End: 2020-01-27 | Stop reason: SDUPTHER

## 2019-07-29 RX ORDER — METHYLPREDNISOLONE 4 MG/1
4 TABLET ORAL SEE ADMIN INSTRUCTIONS
COMMUNITY
End: 2019-12-11

## 2019-07-29 NOTE — PROGRESS NOTES
ALLERGY) 180 MG tablet Take 1 tablet by mouth daily 30 tablet 11    Lactobacillus (RA ACIDOPHILUS) 300 MG CAPS take 1 capsule by mouth once daily 30 capsule 11    glucose monitoring kit (FREESTYLE) monitoring kit 1 kit by Does not apply route daily 1 kit 0    Lancets MISC Use to check glucose 3x's weekly. 100 each 1    fluticasone (FLONASE) 50 MCG/ACT nasal spray 2 sprays to each nostril once daily. 3 Bottle 3    blood glucose test strips (FREESTYLE LITE) strip Test 1 time daily 100 strip 3     No current facility-administered medications for this visit. Allergies   Allergen Reactions    Latex Dermatitis    Amoxil [Amoxicillin] Diarrhea    Bactrim [Sulfamethoxazole-Trimethoprim] Itching    Clindamycin/Lincomycin      Nausea,vomiting    Flagyl [Metronidazole] Other (See Comments)     Sees bugs crawling    Biaxin [Clarithromycin] Nausea And Vomiting    Ceftin [Cefuroxime Axetil] Diarrhea and Nausea And Vomiting    Keflex [Cephalexin] Nausea And Vomiting       Health Maintenance   Topic Date Due    Diabetic retinal exam  02/06/1989    Flu vaccine (1) 02/26/2020 (Originally 9/1/2019)    Hepatitis B Vaccine (1 of 3 - Risk 3-dose series) 07/29/2020 (Originally 2/6/1998)    DTaP/Tdap/Td vaccine (1 - Tdap) 07/29/2020 (Originally 2/6/1998)    Pneumococcal 0-64 years Vaccine (1 of 1 - PPSV23) 07/29/2020 (Originally 2/6/1985)    Lipid screen  10/20/2019    Diabetic microalbuminuria test  10/24/2019    A1C test (Diabetic or Prediabetic)  10/29/2019    Diabetic foot exam  07/29/2020    Cervical cancer screen  05/01/2021    HIV screen  Completed       Subjective:      Review of Systems   Constitutional: Negative for unexpected weight change. Genitourinary: Positive for vaginal discharge. Psychiatric/Behavioral: The patient is nervous/anxious (stable).         Objective:     Vitals:    07/29/19 1330   BP: 124/72   Pulse: 63   SpO2: 97%   Weight: 146 lb (66.2 kg)   Height: 5' 3\" (1.6 m)     Physical Exam   Constitutional: She is oriented to person, place, and time. She appears well-developed and well-nourished. No distress. HENT:   Head: Normocephalic and atraumatic. Eyes: Conjunctivae are normal.   Neck: Neck supple. Cardiovascular: Normal rate, regular rhythm and normal heart sounds. Pulmonary/Chest: Effort normal and breath sounds normal. No respiratory distress. Abdominal: Soft. Bowel sounds are normal. She exhibits no distension. There is no tenderness. Musculoskeletal: She exhibits no edema. Neurological: She is alert and oriented to person, place, and time. Skin: Skin is warm and dry. Psychiatric: She has a normal mood and affect. Assessment:       Diagnosis Orders   1. Type 2 diabetes mellitus without complication, without long-term current use of insulin ([facility])   DIABETES FOOT EXAM    Dulaglutide (TRULICITY) 8.82 ON/1.9WZ SOPN   2. Recurrent candidiasis of vagina  fluconazole (DIFLUCAN) 150 MG tablet   3. Chronic sinus complaints  External Referral To ENT   4. Dizziness  External Referral To ENT   5. Eustachian tube dysfunction, bilateral  External Referral To ENT         Plan:      Return in about 3 months (around 11/5/2019) for Follow-up DM at 1:20 pm.    Orders Placed This Encounter   Procedures    External Referral To ENT     Referral Priority:   Routine     Referral Type:   Eval and Treat     Referral Reason:   Specialty Services Required     Requested Specialty:   Otolaryngology     Number of Visits Requested:   1     DIABETES FOOT EXAM     Orders Placed This Encounter   Medications    Dulaglutide (TRULICITY) 5.80 YN/0.7OD SOPN     Sig: Inject 0.75 mg into the skin once a week     Dispense:  2 mL     Refill:  2    fluconazole (DIFLUCAN) 150 MG tablet     Sig: Take one daily now and repeat dose in 4 days. Dispense:  2 tablet     Refill:  5       Patient given educational materials - see patient instructions.   Discussed use, benefit, and side effects of prescribed

## 2019-07-29 NOTE — CARE COORDINATION
Ambulatory Care Coordination Note  7/29/2019  CM Risk Score: 6  Charlson 10 Year Mortality Risk Score: 4%     ACC: John Webb RN    Summary Note: Alisia Betancourt was referred for [facility]. She has Type II Diabetes not on insulin- uncontrolled.      She is following with surgeon and ortho (bone cyst hand).   She has been having testing done under surgeon for her GERD.      Plan of Care : F/U on BS readings- F/U on ref to Oliver Lindquist CNP. Appt 4/71632 (PBJ 7/10- Pt r/s to 7/22 and then r/s to 8/7/2019)                           F/U on medication                          Assist with updating HM     F/U on Tobacco Abuse                          F/U on exercise- she reported that she used to run                          Continue assessments, education, and support    Met with Emily while here to see PCP. She stated that she has had a death in the family and now father has had \"some strokes and is in the hospital\". She admits to not monitoring BS nor taking her diabetic medication. She was advised to resume glucose testing and bring readings to appointmnet with Venus. She stated she is still interested in trying the once a week injection for DM. She will have Hgb A1C draw today. PCP ordered Trulicity today. She is to keep appt with Venus.        Lab Results   Component Value Date    LABA1C 9.7 (H) 07/29/2019    LABA1C 9.3 (H) 02/23/2019    LABA1C 8.5 (H) 10/20/2018     Lab Results   Component Value Date     07/29/2019     02/23/2019     10/20/2018     General Assessment    Do you have any symptoms that are causing concern?:  Yes  Progression since Onset:  Intermittent - Waxing/Waning  Reported Symptoms:  Other (Comment: \"yeast infection\")       Diabetes Assessment    Medic Alert ID:  No  Meal Planning:  Avoidance of concentrated sweets   How often do you test your blood sugar?:  No Testing   Do you have barriers with adherence to non-pharmacologic self-management interventions?

## 2019-08-01 ENCOUNTER — HOSPITAL ENCOUNTER (OUTPATIENT)
Dept: MAMMOGRAPHY | Age: 39
Discharge: HOME | End: 2019-08-03
Payer: COMMERCIAL

## 2019-08-01 DIAGNOSIS — Z12.39 BREAST CANCER SCREENING: ICD-10-CM

## 2019-08-01 PROCEDURE — 77063 BREAST TOMOSYNTHESIS BI: CPT

## 2019-08-06 ENCOUNTER — TELEPHONE (OUTPATIENT)
Dept: FAMILY MEDICINE CLINIC | Age: 39
End: 2019-08-06

## 2019-08-06 DIAGNOSIS — H69.83 EUSTACHIAN TUBE DYSFUNCTION, BILATERAL: Primary | ICD-10-CM

## 2019-08-06 NOTE — TELEPHONE ENCOUNTER
Left message on Emily's voicemail that we do not do hearing tests here at [facility] & that the closest place would be the [facility] down the street. Referral entered & faxed to [facility]. Left message for pt to call if she needs further assistance.

## 2019-08-08 DIAGNOSIS — E11.9 TYPE 2 DIABETES MELLITUS WITHOUT COMPLICATION, WITHOUT LONG-TERM CURRENT USE OF INSULIN (HCC): ICD-10-CM

## 2019-08-12 ENCOUNTER — HOSPITAL ENCOUNTER (OUTPATIENT)
Dept: MAMMOGRAPHY | Age: 39
Discharge: HOME | End: 2019-08-14
Payer: COMMERCIAL

## 2019-08-12 ENCOUNTER — HOSPITAL ENCOUNTER (OUTPATIENT)
Dept: ULTRASOUND IMAGING | Age: 39
Discharge: HOME | End: 2019-08-14
Payer: COMMERCIAL

## 2019-08-12 DIAGNOSIS — R92.8 ABNORMAL MAMMOGRAM: ICD-10-CM

## 2019-08-12 PROCEDURE — G0279 TOMOSYNTHESIS, MAMMO: HCPCS

## 2019-08-12 PROCEDURE — 76642 ULTRASOUND BREAST LIMITED: CPT

## 2019-08-20 ENCOUNTER — PATIENT MESSAGE (OUTPATIENT)
Dept: FAMILY MEDICINE CLINIC | Age: 39
End: 2019-08-20

## 2019-08-20 DIAGNOSIS — E11.9 TYPE 2 DIABETES MELLITUS WITHOUT COMPLICATION, WITHOUT LONG-TERM CURRENT USE OF INSULIN (HCC): Primary | ICD-10-CM

## 2019-08-22 RX ORDER — FLASH GLUCOSE SCANNING READER
EACH MISCELLANEOUS
Qty: 1 DEVICE | Refills: 0 | Status: SHIPPED | OUTPATIENT
Start: 2019-08-22 | End: 2020-01-27

## 2019-08-22 RX ORDER — FLASH GLUCOSE SENSOR
KIT MISCELLANEOUS
Qty: 1 EACH | Refills: 2 | Status: SHIPPED | OUTPATIENT
Start: 2019-08-22 | End: 2020-01-27

## 2019-08-23 ENCOUNTER — CARE COORDINATION (OUTPATIENT)
Dept: CARE COORDINATION | Age: 39
End: 2019-08-23

## 2019-09-04 ENCOUNTER — PATIENT MESSAGE (OUTPATIENT)
Dept: FAMILY MEDICINE CLINIC | Age: 39
End: 2019-09-04

## 2019-09-04 DIAGNOSIS — E11.9 TYPE 2 DIABETES MELLITUS WITHOUT COMPLICATION, WITHOUT LONG-TERM CURRENT USE OF INSULIN (HCC): Primary | ICD-10-CM

## 2019-09-04 NOTE — TELEPHONE ENCOUNTER
From: Shayan Vann  To: Raffaele Reinoso DO  Sent: 9/4/2019 3:36 PM EDT  Subject: Prescription Question    Hello. I went to get my test strips and they were more money than my shots for the month. I called my insurance and I guess they only cover accucheck products. Was wondering if you could get me a new prescription for a new machine and test strips so they wouldn't be so expensive to refill. I use [facility]. I'm working on trying to get the one for my arm covered. They carrie that has my insurance said they will cover it but he had to go through some extra steps. So I guess until then. ..     Let me know thanks  Johan Quiros

## 2019-09-06 ENCOUNTER — CARE COORDINATION (OUTPATIENT)
Dept: CARE COORDINATION | Age: 39
End: 2019-09-06

## 2019-09-06 NOTE — CARE COORDINATION
Ambulatory Care Coordination Note  9/6/2019  CM Risk Score: 6  Charlson 10 Year Mortality Risk Score: 4%     ACC: Abdirashid Rodriguez, RN    Summary Note: Felisa Adams was referred for [facility]. She has Type II Diabetes not on insulin- uncontrolled.      She is following with surgeon and ortho (bone cyst hand).   She has been having testing done under surgeon for her GERD.        Plan of Care : F/U on BS readings- F/U on ref to Evalene Cooks CNP. Appt 8/22563 (was 7/10- Pt r/s to 7/22 and then r/s to 8/7/2019 then r/s to 9/12/2019)     F/U on BS readings- mailed BS log sheet 9/6/2019.                           F/U on medication                          Assist with updating HM                           F/U on Tobacco Abuse                          F/U on exercise- she reported that she used to run    Complete SDOH                          Continue assessments, education, and support     8/23/2019- 5 pm- LM requesting return call. 9/6/2019- 2:50 pm-  Spoke with Emily. She had to get a new prescription for glucometer and test strips. She was at work and could not talk for long. Reviewed target ranges for BS and mailed literature- BS log sheet to Patient. Hgb A1C literature mailed along with complications of Diabetes. She is afraid she may need to r/s with Crystal since work is so busy. Lab Results   Component Value Date    LABA1C 9.7 (H) 07/29/2019    LABA1C 9.3 (H) 02/23/2019    LABA1C 8.5 (H) 10/20/2018     Lab Results   Component Value Date     07/29/2019     02/23/2019     10/20/2018           Diabetes Assessment    Medic Alert ID:  No  Meal Planning:  Avoidance of concentrated sweets   How often do you test your blood sugar?:  No Testing   Do you have barriers with adherence to non-pharmacologic self-management interventions?  (Nutrition/Exercise/Self-Monitoring):  Yes   Have you ever had to go to the ED for symptoms of low blood sugar?:  No       No patient-reported symptoms            Care Coordination

## 2019-09-12 RX ORDER — GLUCOSAMINE HCL/CHONDROITIN SU 500-400 MG
CAPSULE ORAL
Qty: 100 STRIP | Refills: 3 | Status: SHIPPED | OUTPATIENT
Start: 2019-09-12 | End: 2020-01-27 | Stop reason: SDUPTHER

## 2019-09-13 DIAGNOSIS — K21.9 GASTROESOPHAGEAL REFLUX DISEASE, ESOPHAGITIS PRESENCE NOT SPECIFIED: ICD-10-CM

## 2019-09-13 DIAGNOSIS — K21.9 LPRD (LARYNGOPHARYNGEAL REFLUX DISEASE): ICD-10-CM

## 2019-09-17 RX ORDER — RANITIDINE 300 MG/1
TABLET ORAL
Qty: 90 TABLET | Refills: 1 | Status: SHIPPED | OUTPATIENT
Start: 2019-09-17 | End: 2019-12-03 | Stop reason: ALTCHOICE

## 2019-09-20 ENCOUNTER — CARE COORDINATION (OUTPATIENT)
Dept: FAMILY MEDICINE CLINIC | Age: 39
End: 2019-09-20

## 2019-09-20 NOTE — CARE COORDINATION
Ambulatory Care Coordination Note  9/20/2019  CM Risk Score: 6  Charlson 10 Year Mortality Risk Score: 4%     ACC: Shawanda Butt, RN    Summary Note:  Maddi Ocampo was referred for [facility]. She has Type II Diabetes not on insulin- uncontrolled.      She is following with surgeon and ortho (bone cyst hand).   She has been having testing done under surgeon for her GERD.          Plan of Care : F/U on BS readings- F/U on ref to Nayely Winslow CNP. Appt 8/80188 (was 7/10- Pt r/s to 7/22 and then r/s to 8/7/2019 then r/s to 9/12/2019) She has cancelled as of 9/20/2019. F/U on BS readings- mailed BS log sheet 9/6/2019.                           F/U on medication                          Assist with updating [address]                          F/U on Tobacco Abuse                          F/U on exercise- she reported that she used to run                          Complete SDOH                          Continue assessments, education, and support    9/20/2019- 2:21 pm- Unable to reach by phone- LM requesting return call. Care Coordination Interventions    Program Enrollment:  Complex Care  Referral from Primary Care Provider:  No  Suggested Interventions and Community Resources  Diabetes Education:  Completed (Comment: referred to Nayely Winslow CNP )  Zone Management Tools:  Completed (Comment: DM)         Prior to Admission medications    Medication Sig Start Date End Date Taking? Authorizing Provider   ranitidine (ZANTAC) 300 MG tablet take 1 tablet by mouth at bedtime 9/17/19   Stef Eason, DO   blood glucose monitor kit and supplies Test one time a day & as needed for symptoms of irregular blood glucose. 9/12/19   Stef Eason, DO   blood glucose monitor strips Test 1 time a day & as needed for symptoms of irregular blood glucose. 9/12/19   Óscar Beckman, DO   Continuous Blood Gluc  (FREESTYLE STANTON 14 DAY READER) GHISLAINE Use to monitor blood sugar.  8/22/19   Stef Eason, DO   Continuous Blood Gluc Sensor

## 2019-09-25 ENCOUNTER — CARE COORDINATION (OUTPATIENT)
Dept: CARE COORDINATION | Age: 39
End: 2019-09-25

## 2019-09-27 ENCOUNTER — HOSPITAL ENCOUNTER (OUTPATIENT)
Age: 39
Setting detail: SPECIMEN
Discharge: HOME | End: 2019-09-27
Payer: COMMERCIAL

## 2019-09-27 ENCOUNTER — OFFICE VISIT (OUTPATIENT)
Dept: PRIMARY CARE CLINIC | Age: 39
End: 2019-09-27
Payer: COMMERCIAL

## 2019-09-27 VITALS
HEART RATE: 87 BPM | OXYGEN SATURATION: 98 % | SYSTOLIC BLOOD PRESSURE: 128 MMHG | DIASTOLIC BLOOD PRESSURE: 78 MMHG | HEIGHT: 63 IN | RESPIRATION RATE: 16 BRPM | TEMPERATURE: 99.1 F | BODY MASS INDEX: 26.4 KG/M2 | WEIGHT: 149 LBS

## 2019-09-27 DIAGNOSIS — R30.0 DYSURIA: Primary | ICD-10-CM

## 2019-09-27 DIAGNOSIS — N30.00 ACUTE CYSTITIS WITHOUT HEMATURIA: ICD-10-CM

## 2019-09-27 DIAGNOSIS — R30.0 DYSURIA: ICD-10-CM

## 2019-09-27 LAB
-: ABNORMAL
AMORPHOUS: ABNORMAL
BACTERIA: ABNORMAL
BILIRUBIN URINE: NEGATIVE
CASTS UA: ABNORMAL /LPF (ref 0–2)
COLOR: ABNORMAL
COMMENT UA: ABNORMAL
CRYSTALS, UA: ABNORMAL /HPF
EPITHELIAL CELLS UA: ABNORMAL /HPF (ref 0–5)
GLUCOSE URINE: ABNORMAL
KETONES, URINE: ABNORMAL
LEUKOCYTE ESTERASE, URINE: ABNORMAL
MUCUS: ABNORMAL
NITRITE, URINE: NEGATIVE
OTHER OBSERVATIONS UA: ABNORMAL
PH UA: 6.5 (ref 5–6)
PROTEIN UA: ABNORMAL
RBC UA: ABNORMAL /HPF (ref 0–4)
RENAL EPITHELIAL, UA: ABNORMAL /HPF
SPECIFIC GRAVITY UA: 1.02 (ref 1.01–1.02)
TRICHOMONAS: ABNORMAL
TURBIDITY: ABNORMAL
URINE HGB: ABNORMAL
UROBILINOGEN, URINE: NORMAL
WBC UA: ABNORMAL /HPF (ref 0–4)
YEAST: ABNORMAL

## 2019-09-27 PROCEDURE — 81001 URINALYSIS AUTO W/SCOPE: CPT

## 2019-09-27 PROCEDURE — 87086 URINE CULTURE/COLONY COUNT: CPT

## 2019-09-27 PROCEDURE — 99213 OFFICE O/P EST LOW 20 MIN: CPT | Performed by: PHYSICIAN ASSISTANT

## 2019-09-27 RX ORDER — CIPROFLOXACIN 500 MG/1
500 TABLET, FILM COATED ORAL 2 TIMES DAILY
Qty: 14 TABLET | Refills: 0 | Status: SHIPPED | OUTPATIENT
Start: 2019-09-27 | End: 2019-10-04

## 2019-09-27 RX ORDER — PHENAZOPYRIDINE HYDROCHLORIDE 200 MG/1
200 TABLET, FILM COATED ORAL 3 TIMES DAILY PRN
Qty: 9 TABLET | Refills: 1 | Status: SHIPPED | OUTPATIENT
Start: 2019-09-27 | End: 2019-09-30

## 2019-09-27 ASSESSMENT — ENCOUNTER SYMPTOMS
NAUSEA: 0
CONSTIPATION: 1
BACK PAIN: 1
RESPIRATORY NEGATIVE: 1
VOMITING: 0

## 2019-09-29 LAB
CULTURE: NO GROWTH
Lab: NORMAL
SPECIMEN DESCRIPTION: NORMAL

## 2019-10-01 ENCOUNTER — TELEPHONE (OUTPATIENT)
Dept: PRIMARY CARE CLINIC | Age: 39
End: 2019-10-01

## 2019-10-03 ENCOUNTER — CARE COORDINATION (OUTPATIENT)
Dept: CARE COORDINATION | Age: 39
End: 2019-10-03

## 2019-10-07 ENCOUNTER — PATIENT MESSAGE (OUTPATIENT)
Dept: FAMILY MEDICINE CLINIC | Age: 39
End: 2019-10-07

## 2019-10-07 DIAGNOSIS — E11.9 TYPE 2 DIABETES MELLITUS WITHOUT COMPLICATION, WITHOUT LONG-TERM CURRENT USE OF INSULIN (HCC): ICD-10-CM

## 2019-10-07 DIAGNOSIS — K21.00 GASTROESOPHAGEAL REFLUX DISEASE WITH ESOPHAGITIS: ICD-10-CM

## 2019-10-09 ENCOUNTER — CARE COORDINATION (OUTPATIENT)
Dept: CARE COORDINATION | Age: 39
End: 2019-10-09

## 2019-10-09 RX ORDER — ESOMEPRAZOLE MAGNESIUM 40 MG/1
40 CAPSULE, DELAYED RELEASE ORAL 2 TIMES DAILY
Qty: 180 CAPSULE | Refills: 1 | Status: SHIPPED | OUTPATIENT
Start: 2019-10-09 | End: 2021-01-07 | Stop reason: DRUGHIGH

## 2019-10-14 ENCOUNTER — HOSPITAL ENCOUNTER (OUTPATIENT)
Dept: LAB | Age: 39
Discharge: HOME | End: 2019-10-14
Payer: COMMERCIAL

## 2019-10-14 DIAGNOSIS — R35.0 FREQUENT URINATION: ICD-10-CM

## 2019-10-14 DIAGNOSIS — R35.0 FREQUENT URINATION: Primary | ICD-10-CM

## 2019-10-14 LAB
-: ABNORMAL
AMORPHOUS: ABNORMAL
BACTERIA: ABNORMAL
BILIRUBIN URINE: NEGATIVE
CASTS UA: ABNORMAL /LPF (ref 0–2)
COLOR: ABNORMAL
COMMENT UA: ABNORMAL
CRYSTALS, UA: ABNORMAL /HPF
EPITHELIAL CELLS UA: ABNORMAL /HPF (ref 0–5)
GLUCOSE URINE: ABNORMAL
KETONES, URINE: ABNORMAL
LEUKOCYTE ESTERASE, URINE: NEGATIVE
MUCUS: ABNORMAL
NITRITE, URINE: NEGATIVE
OTHER OBSERVATIONS UA: ABNORMAL
PH UA: 6 (ref 5–6)
PROTEIN UA: ABNORMAL
RBC UA: ABNORMAL /HPF (ref 0–4)
RENAL EPITHELIAL, UA: ABNORMAL /HPF
SPECIFIC GRAVITY UA: 1.03 (ref 1.01–1.02)
TRICHOMONAS: ABNORMAL
TURBIDITY: ABNORMAL
URINE HGB: NEGATIVE
UROBILINOGEN, URINE: NORMAL
WBC UA: ABNORMAL /HPF (ref 0–4)
YEAST: ABNORMAL

## 2019-10-14 PROCEDURE — 81001 URINALYSIS AUTO W/SCOPE: CPT

## 2019-10-15 ENCOUNTER — TELEPHONE (OUTPATIENT)
Dept: FAMILY MEDICINE CLINIC | Age: 39
End: 2019-10-15

## 2019-10-15 DIAGNOSIS — R35.0 URINARY FREQUENCY: Primary | ICD-10-CM

## 2019-10-18 ENCOUNTER — OFFICE VISIT (OUTPATIENT)
Dept: PRIMARY CARE CLINIC | Age: 39
End: 2019-10-18
Payer: COMMERCIAL

## 2019-10-18 ENCOUNTER — HOSPITAL ENCOUNTER (OUTPATIENT)
Age: 39
Setting detail: SPECIMEN
Discharge: HOME | End: 2019-10-18
Payer: COMMERCIAL

## 2019-10-18 VITALS
HEART RATE: 74 BPM | SYSTOLIC BLOOD PRESSURE: 112 MMHG | TEMPERATURE: 98 F | BODY MASS INDEX: 26.57 KG/M2 | OXYGEN SATURATION: 98 % | WEIGHT: 150 LBS | DIASTOLIC BLOOD PRESSURE: 74 MMHG

## 2019-10-18 DIAGNOSIS — R30.0 DYSURIA: ICD-10-CM

## 2019-10-18 DIAGNOSIS — N89.8 VAGINAL DISCHARGE: ICD-10-CM

## 2019-10-18 DIAGNOSIS — J01.41 ACUTE RECURRENT PANSINUSITIS: ICD-10-CM

## 2019-10-18 DIAGNOSIS — N89.8 VAGINAL DISCHARGE: Primary | ICD-10-CM

## 2019-10-18 DIAGNOSIS — R35.0 URINARY FREQUENCY: ICD-10-CM

## 2019-10-18 LAB
-: ABNORMAL
AMORPHOUS: ABNORMAL
BACTERIA: ABNORMAL
BILIRUBIN URINE: NEGATIVE
CASTS UA: ABNORMAL /LPF (ref 0–2)
COLOR: ABNORMAL
COMMENT UA: ABNORMAL
CRYSTALS, UA: ABNORMAL /HPF
DIRECT EXAM: NORMAL
EPITHELIAL CELLS UA: ABNORMAL /HPF (ref 0–5)
GLUCOSE URINE: ABNORMAL
KETONES, URINE: NEGATIVE
LEUKOCYTE ESTERASE, URINE: NEGATIVE
Lab: NORMAL
MUCUS: ABNORMAL
NITRITE, URINE: NEGATIVE
OTHER OBSERVATIONS UA: ABNORMAL
PH UA: 5.5 (ref 5–6)
PROTEIN UA: ABNORMAL
RBC UA: ABNORMAL /HPF (ref 0–4)
RENAL EPITHELIAL, UA: ABNORMAL /HPF
SPECIFIC GRAVITY UA: 1.03 (ref 1.01–1.02)
SPECIMEN DESCRIPTION: NORMAL
TRICHOMONAS: ABNORMAL
TURBIDITY: ABNORMAL
URINE HGB: NEGATIVE
UROBILINOGEN, URINE: NORMAL
WBC UA: ABNORMAL /HPF (ref 0–4)
YEAST: ABNORMAL

## 2019-10-18 PROCEDURE — 87660 TRICHOMONAS VAGIN DIR PROBE: CPT

## 2019-10-18 PROCEDURE — 81001 URINALYSIS AUTO W/SCOPE: CPT

## 2019-10-18 PROCEDURE — 87510 GARDNER VAG DNA DIR PROBE: CPT

## 2019-10-18 PROCEDURE — 87480 CANDIDA DNA DIR PROBE: CPT

## 2019-10-18 PROCEDURE — 99214 OFFICE O/P EST MOD 30 MIN: CPT | Performed by: FAMILY MEDICINE

## 2019-10-18 RX ORDER — LEVOFLOXACIN 500 MG/1
500 TABLET, FILM COATED ORAL DAILY
Qty: 7 TABLET | Refills: 0 | Status: SHIPPED | OUTPATIENT
Start: 2019-10-18 | End: 2019-10-25

## 2019-10-18 ASSESSMENT — ENCOUNTER SYMPTOMS
SORE THROAT: 0
SINUS PRESSURE: 1
VOICE CHANGE: 1
SINUS COMPLAINT: 1
COUGH: 1
VOMITING: 1

## 2019-11-04 DIAGNOSIS — R14.0 ABDOMINAL BLOATING: ICD-10-CM

## 2019-11-06 RX ORDER — ACETAMINOPHEN, DEXTROMETHORPHAN HYDROBROMIDE, DOXYLAMINE SUCCINATE, PHENYLEPHRINE HYDROCHLORIDE 650; 20; 12.5; 1 MG/30ML; MG/30ML; MG/30ML; MG/30ML
SOLUTION ORAL
Qty: 30 CAPSULE | Refills: 11 | Status: SHIPPED | OUTPATIENT
Start: 2019-11-06 | End: 2020-06-09

## 2019-12-02 DIAGNOSIS — K21.9 GASTROESOPHAGEAL REFLUX DISEASE, ESOPHAGITIS PRESENCE NOT SPECIFIED: ICD-10-CM

## 2019-12-02 DIAGNOSIS — K21.9 LPRD (LARYNGOPHARYNGEAL REFLUX DISEASE): ICD-10-CM

## 2019-12-02 RX ORDER — RANITIDINE 300 MG/1
TABLET ORAL
Qty: 90 TABLET | Refills: 1 | Status: CANCELLED | OUTPATIENT
Start: 2019-12-02

## 2019-12-03 RX ORDER — FAMOTIDINE 20 MG/1
20 TABLET, FILM COATED ORAL 2 TIMES DAILY
Qty: 180 TABLET | Refills: 1 | Status: SHIPPED | OUTPATIENT
Start: 2019-12-03 | End: 2019-12-11 | Stop reason: SDUPTHER

## 2019-12-11 ENCOUNTER — OFFICE VISIT (OUTPATIENT)
Dept: FAMILY MEDICINE CLINIC | Age: 39
End: 2019-12-11
Payer: COMMERCIAL

## 2019-12-11 VITALS
SYSTOLIC BLOOD PRESSURE: 122 MMHG | BODY MASS INDEX: 26.22 KG/M2 | HEIGHT: 63 IN | WEIGHT: 148 LBS | DIASTOLIC BLOOD PRESSURE: 76 MMHG

## 2019-12-11 DIAGNOSIS — K21.9 LPRD (LARYNGOPHARYNGEAL REFLUX DISEASE): ICD-10-CM

## 2019-12-11 DIAGNOSIS — J30.1 CHRONIC SEASONAL ALLERGIC RHINITIS DUE TO POLLEN: ICD-10-CM

## 2019-12-11 DIAGNOSIS — K21.9 GASTROESOPHAGEAL REFLUX DISEASE, ESOPHAGITIS PRESENCE NOT SPECIFIED: ICD-10-CM

## 2019-12-11 PROCEDURE — 99214 OFFICE O/P EST MOD 30 MIN: CPT | Performed by: NURSE PRACTITIONER

## 2019-12-11 RX ORDER — FLUTICASONE PROPIONATE 50 MCG
SPRAY, SUSPENSION (ML) NASAL
Qty: 3 BOTTLE | Refills: 3 | Status: SHIPPED | OUTPATIENT
Start: 2019-12-11 | End: 2022-10-27 | Stop reason: SDUPTHER

## 2019-12-11 RX ORDER — FAMOTIDINE 40 MG/1
40 TABLET, FILM COATED ORAL NIGHTLY
Qty: 90 TABLET | Refills: 3 | Status: SHIPPED | OUTPATIENT
Start: 2019-12-11 | End: 2020-06-09 | Stop reason: SINTOL

## 2019-12-11 RX ORDER — LORATADINE 10 MG/1
10 TABLET ORAL DAILY
COMMUNITY
End: 2020-06-10

## 2019-12-11 ASSESSMENT — ENCOUNTER SYMPTOMS
SORE THROAT: 0
STRIDOR: 0
GLOBUS SENSATION: 0
BELCHING: 0
HOARSE VOICE: 0
WATER BRASH: 0
ABDOMINAL PAIN: 0
COUGH: 0
HEARTBURN: 1
CHOKING: 0
NAUSEA: 0
WHEEZING: 0

## 2020-01-09 ENCOUNTER — OFFICE VISIT (OUTPATIENT)
Dept: SURGERY | Age: 40
End: 2020-01-09
Payer: COMMERCIAL

## 2020-01-09 VITALS
HEIGHT: 63 IN | HEART RATE: 92 BPM | BODY MASS INDEX: 26.4 KG/M2 | SYSTOLIC BLOOD PRESSURE: 136 MMHG | TEMPERATURE: 99.4 F | WEIGHT: 149 LBS | DIASTOLIC BLOOD PRESSURE: 90 MMHG

## 2020-01-09 PROCEDURE — 99214 OFFICE O/P EST MOD 30 MIN: CPT | Performed by: SURGERY

## 2020-01-09 NOTE — PROGRESS NOTES
10 mg by mouth daily      famotidine (PEPCID) 40 MG tablet Take 1 tablet by mouth nightly 90 tablet 3    fluticasone (FLONASE) 50 MCG/ACT nasal spray 2 sprays to each nostril once daily. 3 Bottle 3    Probiotic Product (RA PROBIOTIC COMPLEX) CAPS take 1 capsule by mouth once daily 30 capsule 11    Dulaglutide (TRULICITY) 3.32 WL/8.5CI SOPN Inject 0.75 mg into the skin once a week 12 pen 1    esomeprazole (NEXIUM) 40 MG delayed release capsule Take 1 capsule by mouth 2 times daily 180 capsule 1    blood glucose monitor kit and supplies Test one time a day & as needed for symptoms of irregular blood glucose. 1 kit 0    blood glucose monitor strips Test 1 time a day & as needed for symptoms of irregular blood glucose. 100 strip 3    Continuous Blood Gluc  (FREESTYLE STANTON 14 DAY READER) GHISLAINE Use to monitor blood sugar. 1 Device 0    Continuous Blood Gluc Sensor (FREESTYLE STANTON 14 DAY SENSOR) MISC Use to monitor blood sugar. 1 each 2    Norgestim-Eth Estrad Triphasic (NORGESTIMATE-ETHINYL ESTRADIOL PO) Take 1 tablet by mouth daily      LORazepam (ATIVAN) 0.5 MG tablet Take 0.5 mg by mouth every 6 hours as needed for Anxiety.  dicyclomine (BENTYL) 10 MG capsule Take 2 capsules by mouth every 6 hours as needed (cramps) 30 capsule 0    albuterol sulfate  (90 Base) MCG/ACT inhaler Inhale 2 puffs into the lungs 4 times daily as needed for Wheezing 1 Inhaler 0    Lancets MISC Use to check glucose 3x's weekly. 100 each 1    fluconazole (DIFLUCAN) 150 MG tablet Take one daily now and repeat dose in 4 days. (Patient not taking: Reported on 1/9/2020) 2 tablet 5     No current facility-administered medications for this visit. Home Medications:   Prior to Admission medications    Medication Sig Start Date End Date Taking?  Authorizing Provider   loratadine (CLARITIN) 10 MG tablet Take 10 mg by mouth daily   Yes Historical Provider, MD   famotidine (PEPCID) 40 MG tablet Take 1 tablet by mouth normal    LABS:  CBC   Lab Results   Component Value Date    WBC 9.2 03/31/2019    HGB 14.8 03/31/2019    HCT 43.6 03/31/2019     03/31/2019     BMP   Lab Results   Component Value Date     01/18/2020    K 4.2 01/18/2020    CL 98 01/18/2020    CO2 24 01/18/2020    BUN 8 01/18/2020    CREATININE 0.61 01/18/2020    PHOS 2.6 02/17/2017    MG 1.9 01/11/2015     LFT's:   Lab Results   Component Value Date    AST 41 01/18/2020    ALT 44 01/18/2020    ALKPHOS 48 01/18/2020    BILITOT 0.41 01/18/2020    BILIDIR 0.09 07/29/2019    AMYLASE 47 10/20/2017    LIPASE 32 10/20/2017     COAGS: No results found for: INR, PTT  Lipids:   Lab Results   Component Value Date    CHOL 275 01/18/2020    HDL 30 01/18/2020    LDLCHOLESTEROL 214 01/18/2020    CHOLHDLRATIO 9.2 01/18/2020    TRIG 154 01/18/2020    VLDL NOT REPORTED 01/18/2020       RADIOLOGY:  All images reviewed and within normal limits unless otherwise specified: Yes    IMPRESSIONS:  1. GERD  2.  heartburn    Surgical Risk: moderate to high risk    PLAN:  1. EGD    Medical Decision Making: moderate complexity    Thank you for the interesting evaluation. Further recommendations to follow.     Ligia Henry MD  Electronically signed 1/9/2020 at 8:13 PM

## 2020-01-16 ENCOUNTER — TELEPHONE (OUTPATIENT)
Dept: FAMILY MEDICINE CLINIC | Age: 40
End: 2020-01-16

## 2020-01-16 NOTE — TELEPHONE ENCOUNTER
Pt calling stating she has an appt 1-27 and was told to do labs prior but there are no orders, please advise pt is labs are ordered so she can do them prior.

## 2020-01-18 ENCOUNTER — HOSPITAL ENCOUNTER (OUTPATIENT)
Dept: LAB | Age: 40
Discharge: HOME | End: 2020-01-18
Payer: COMMERCIAL

## 2020-01-18 LAB
ALBUMIN SERPL-MCNC: 4.7 G/DL (ref 3.5–5.2)
ALBUMIN/GLOBULIN RATIO: 1.6 (ref 1–2.5)
ALP BLD-CCNC: 48 U/L (ref 35–104)
ALT SERPL-CCNC: 44 U/L (ref 5–33)
ANION GAP SERPL CALCULATED.3IONS-SCNC: 16 MMOL/L (ref 9–17)
AST SERPL-CCNC: 41 U/L
BILIRUB SERPL-MCNC: 0.41 MG/DL (ref 0.3–1.2)
BUN BLDV-MCNC: 8 MG/DL (ref 6–20)
BUN/CREAT BLD: 13 (ref 9–20)
CALCIUM SERPL-MCNC: 9.8 MG/DL (ref 8.6–10.4)
CHLORIDE BLD-SCNC: 98 MMOL/L (ref 98–107)
CHOLESTEROL/HDL RATIO: 9.2
CHOLESTEROL: 275 MG/DL
CO2: 24 MMOL/L (ref 20–31)
CREAT SERPL-MCNC: 0.61 MG/DL (ref 0.5–0.9)
CREATININE URINE: 363.2 MG/DL (ref 28–217)
ESTIMATED AVERAGE GLUCOSE: 212 MG/DL
GFR AFRICAN AMERICAN: >60 ML/MIN
GFR NON-AFRICAN AMERICAN: >60 ML/MIN
GFR SERPL CREATININE-BSD FRML MDRD: ABNORMAL ML/MIN/{1.73_M2}
GFR SERPL CREATININE-BSD FRML MDRD: ABNORMAL ML/MIN/{1.73_M2}
GLUCOSE BLD-MCNC: 177 MG/DL (ref 70–99)
HBA1C MFR BLD: 9 % (ref 4.8–5.9)
HDLC SERPL-MCNC: 30 MG/DL
LDL CHOLESTEROL: 214 MG/DL (ref 0–130)
MICROALBUMIN/CREAT 24H UR: 91 MG/L
MICROALBUMIN/CREAT UR-RTO: 25 MCG/MG CREAT
POTASSIUM SERPL-SCNC: 4.2 MMOL/L (ref 3.7–5.3)
SODIUM BLD-SCNC: 138 MMOL/L (ref 135–144)
TOTAL PROTEIN: 7.7 G/DL (ref 6.4–8.3)
TRIGL SERPL-MCNC: 154 MG/DL
VLDLC SERPL CALC-MCNC: ABNORMAL MG/DL (ref 1–30)

## 2020-01-18 PROCEDURE — 36415 COLL VENOUS BLD VENIPUNCTURE: CPT

## 2020-01-18 PROCEDURE — 82570 ASSAY OF URINE CREATININE: CPT

## 2020-01-18 PROCEDURE — 83036 HEMOGLOBIN GLYCOSYLATED A1C: CPT

## 2020-01-18 PROCEDURE — 82043 UR ALBUMIN QUANTITATIVE: CPT

## 2020-01-18 PROCEDURE — 80053 COMPREHEN METABOLIC PANEL: CPT

## 2020-01-18 PROCEDURE — 80061 LIPID PANEL: CPT

## 2020-01-27 ENCOUNTER — OFFICE VISIT (OUTPATIENT)
Dept: FAMILY MEDICINE CLINIC | Age: 40
End: 2020-01-27
Payer: COMMERCIAL

## 2020-01-27 ENCOUNTER — HOSPITAL ENCOUNTER (OUTPATIENT)
Age: 40
Setting detail: SPECIMEN
Discharge: HOME | End: 2020-01-27
Payer: COMMERCIAL

## 2020-01-27 VITALS
SYSTOLIC BLOOD PRESSURE: 144 MMHG | HEART RATE: 92 BPM | OXYGEN SATURATION: 99 % | DIASTOLIC BLOOD PRESSURE: 92 MMHG | WEIGHT: 149 LBS | BODY MASS INDEX: 26.4 KG/M2 | HEIGHT: 63 IN

## 2020-01-27 LAB
-: ABNORMAL
AMORPHOUS: ABNORMAL
BACTERIA: ABNORMAL
BILIRUBIN URINE: NEGATIVE
CASTS UA: ABNORMAL /LPF (ref 0–2)
COLOR: ABNORMAL
COMMENT UA: ABNORMAL
CRYSTALS, UA: ABNORMAL /HPF
EPITHELIAL CELLS UA: ABNORMAL /HPF (ref 0–5)
GLUCOSE URINE: ABNORMAL
KETONES, URINE: NEGATIVE
LEUKOCYTE ESTERASE, URINE: NEGATIVE
MUCUS: ABNORMAL
NITRITE, URINE: NEGATIVE
OTHER OBSERVATIONS UA: ABNORMAL
PH UA: 6 (ref 5–6)
PROTEIN UA: ABNORMAL
RBC UA: ABNORMAL /HPF (ref 0–4)
RENAL EPITHELIAL, UA: ABNORMAL /HPF
SPECIFIC GRAVITY UA: 1.03 (ref 1.01–1.02)
TRICHOMONAS: ABNORMAL
TURBIDITY: ABNORMAL
URINE HGB: ABNORMAL
UROBILINOGEN, URINE: NORMAL
WBC UA: ABNORMAL /HPF (ref 0–4)
YEAST: ABNORMAL

## 2020-01-27 PROCEDURE — 81001 URINALYSIS AUTO W/SCOPE: CPT

## 2020-01-27 PROCEDURE — 99214 OFFICE O/P EST MOD 30 MIN: CPT | Performed by: FAMILY MEDICINE

## 2020-01-27 PROCEDURE — 87086 URINE CULTURE/COLONY COUNT: CPT

## 2020-01-27 RX ORDER — LANCETS 30 GAUGE
EACH MISCELLANEOUS
Qty: 100 EACH | Refills: 3 | Status: SHIPPED | OUTPATIENT
Start: 2020-01-27 | End: 2020-02-06 | Stop reason: SDUPTHER

## 2020-01-27 RX ORDER — GLUCOSAMINE HCL/CHONDROITIN SU 500-400 MG
CAPSULE ORAL
Qty: 100 STRIP | Refills: 3 | Status: SHIPPED | OUTPATIENT
Start: 2020-01-27 | End: 2020-02-03 | Stop reason: SDUPTHER

## 2020-01-27 RX ORDER — FLUCONAZOLE 150 MG/1
TABLET ORAL
Qty: 6 TABLET | Refills: 3 | Status: SHIPPED | OUTPATIENT
Start: 2020-01-27 | End: 2020-06-10

## 2020-01-27 ASSESSMENT — PATIENT HEALTH QUESTIONNAIRE - PHQ9
1. LITTLE INTEREST OR PLEASURE IN DOING THINGS: 0
SUM OF ALL RESPONSES TO PHQ QUESTIONS 1-9: 0
SUM OF ALL RESPONSES TO PHQ9 QUESTIONS 1 & 2: 0
SUM OF ALL RESPONSES TO PHQ QUESTIONS 1-9: 0
2. FEELING DOWN, DEPRESSED OR HOPELESS: 0

## 2020-01-27 ASSESSMENT — ENCOUNTER SYMPTOMS: CONSTIPATION: 1

## 2020-01-27 NOTE — PROGRESS NOTES
SHAYNA Simons 98  [address], [address] [place] Dylan Rodriguez  [phone]      Emily Chung is a 35 y.o. female who presents today for her medical conditions/complaints as noted below. Jaquan Side is c/o of Diabetes (labs done Jan 18)      HPI:     Pt here today for follow-up of DM. Reviewed lab results from 1/18 - see results. Taking Trulicity 6.45 mg once weekly on Wednesday's - remembers this weekly. Had nausea after starting this, but this has slowly improved. Now, only affects her for 1 day after her dose, so has timed it so she can sleep though some of it. Has noticed some constipation since starting this as well. Pt had several appt's to establish with Harshad Horta, but has not been able to see her yet. Just started Thrive again 10 days ago - last time she did this, it seemed to help with appetite and weight. Took a Diflucan last night, as she started to have symptoms of yeast infection again 2 days ago. Typically getting symptoms of these approx 5-6 weeks, which is improved from before. Needs refill of Diflucan today. Pt still only using Ativan intermittently as needed for anxiety - tries not to use it if she doesn't have to. Taking it maybe every 2 weeks or so, as it makes her very sleepy afterward. Will be getting in with Dr. Marium Lynne in 1 week for evaluation of chronic sinusitis.         Past Medical History:   Diagnosis Date    Allergic rhinitis     GERD (gastroesophageal reflux disease)     Hemorrhoids     Hyperlipidemia     Hypertension     treated in past but lost weight currently on no medication    Hypoglycemia     Kidney stones     Tobacco abuse     UTI (urinary tract infection)     frequency      Past Surgical History:   Procedure Laterality Date    CARPAL TUNNEL RELEASE Left     ENDOMETRIAL ABLATION      LITHOTRIPSY      NASAL SEPTUM SURGERY      OTHER SURGICAL HISTORY Right 09/07/2016    laser ureteroscopic lithotripsy capsule by mouth once daily 30 capsule 11    esomeprazole (NEXIUM) 40 MG delayed release capsule Take 1 capsule by mouth 2 times daily 180 capsule 1    Norgestim-Eth Estrad Triphasic (NORGESTIMATE-ETHINYL ESTRADIOL PO) Take 1 tablet by mouth daily      LORazepam (ATIVAN) 0.5 MG tablet Take 0.5 mg by mouth every 6 hours as needed for Anxiety.  dicyclomine (BENTYL) 10 MG capsule Take 2 capsules by mouth every 6 hours as needed (cramps) 30 capsule 0    albuterol sulfate  (90 Base) MCG/ACT inhaler Inhale 2 puffs into the lungs 4 times daily as needed for Wheezing 1 Inhaler 0    cimetidine (TAGAMET) 800 MG tablet Take 1 tablet by mouth nightly 30 tablet 3    sucralfate (CARAFATE) 1 GM tablet Take 1 tablet by mouth three times daily 90 tablet 3     No current facility-administered medications for this visit. Allergies   Allergen Reactions    Latex Dermatitis    Amoxil [Amoxicillin] Diarrhea    Bactrim [Sulfamethoxazole-Trimethoprim] Itching    Clindamycin/Lincomycin      Nausea,vomiting    Flagyl [Metronidazole] Other (See Comments)     Sees bugs crawling    Biaxin [Clarithromycin] Nausea And Vomiting    Ceftin [Cefuroxime Axetil] Diarrhea and Nausea And Vomiting    Keflex [Cephalexin] Nausea And Vomiting       Health Maintenance   Topic Date Due    Hepatitis B vaccine (1 of 3 - Risk 3-dose series) 07/29/2020 (Originally 2/6/1998)    DTaP/Tdap/Td vaccine (1 - Tdap) 07/29/2020 (Originally 2/6/1990)    Pneumococcal 0-64 years Vaccine (1 of 1 - PPSV23) 07/29/2020 (Originally 2/6/1985)    Flu vaccine (1) 01/27/2021 (Originally 9/1/2019)    A1C test (Diabetic or Prediabetic)  04/18/2020    Diabetic foot exam  07/29/2020    Diabetic retinal exam  01/06/2021    Diabetic microalbuminuria test  01/18/2021    Lipid screen  01/18/2021    Cervical cancer screen  05/01/2021    HIV screen  Completed       Subjective:      Review of Systems   Constitutional: Positive for fatigue. Gastrointestinal: Positive for constipation (intermittent). Had GI virus from 1/10 - 1/20; still feels some \"crampiness\", but bowel movements are normalizing. Genitourinary: Positive for dysuria (mild) and vaginal discharge. Negative for frequency. Objective:     Vitals:    01/27/20 1319 01/27/20 1334   BP: (!) 136/96 (!) 144/92   Site: Right Upper Arm Right Upper Arm   Position: Sitting Sitting   Cuff Size: Large Adult Large Adult   Pulse: 92    SpO2: 99%    Weight: 149 lb (67.6 kg)    Height: 5' 3\" (1.6 m)      Physical Exam  Constitutional:       General: She is not in acute distress. Appearance: She is well-developed. HENT:      Head: Normocephalic and atraumatic. Eyes:      Conjunctiva/sclera: Conjunctivae normal.   Neck:      Musculoskeletal: Neck supple. Cardiovascular:      Rate and Rhythm: Normal rate and regular rhythm. Heart sounds: Normal heart sounds. Pulmonary:      Effort: Pulmonary effort is normal. No respiratory distress. Breath sounds: Normal breath sounds. Abdominal:      General: Bowel sounds are normal. There is no distension. Palpations: Abdomen is soft. Tenderness: There is no abdominal tenderness. Skin:     General: Skin is warm and dry. Neurological:      Mental Status: She is alert and oriented to person, place, and time. Assessment:       Diagnosis Orders   1. Type 2 diabetes mellitus without complication, without long-term current use of insulin ([facility])  blood glucose monitor kit and supplies    blood glucose monitor strips    Dulaglutide (TRULICITY) 1.5 FD/7.5HK SOPN    Hemoglobin A1C    Microalbumin, Ur    Comprehensive Metabolic Panel    Lancets MISC   2. Hyperlipidemia, unspecified hyperlipidemia type  Lipid Panel   3. Recurrent candidiasis of vagina  fluconazole (DIFLUCAN) 150 MG tablet   4. Dysuria  Urinalysis Reflex to Culture   5. Eustachian tube dysfunction, bilateral     6.  Anxiety           Plan:      Return in about 3 months (around 4/27/2020) for Follow-up DM. Orders Placed This Encounter   Procedures    Hemoglobin A1C     Standing Status:   Future     Standing Expiration Date:   1/27/2021    Microalbumin, Ur     Standing Status:   Future     Standing Expiration Date:   7/27/2021    Comprehensive Metabolic Panel     Standing Status:   Future     Standing Expiration Date:   1/27/2021    Urinalysis Reflex to Culture     Standing Status:   Future     Number of Occurrences:   1     Standing Expiration Date:   1/27/2021     Order Specific Question:   SPECIFY(EX-CATH,MIDSTREAM,CYSTO,ETC)? Answer:   clean catch    Lipid Panel     Standing Status:   Future     Standing Expiration Date:   1/27/2021     Order Specific Question:   Is Patient Fasting?/# of Hours     Answer:   12     Orders Placed This Encounter   Medications    blood glucose monitor kit and supplies     Sig: Test one time a day & as needed for symptoms of irregular blood glucose. Dispense:  1 kit     Refill:  0     Please dispense meter that pt's insurance covers - please check the Accu-chek brand.  blood glucose monitor strips     Sig: Test 1 time a day & as needed for symptoms of irregular blood glucose. Dispense:  100 strip     Refill:  3     Please dispense whichever brand pt's insurance prefers (pt thinks Accuchek is covered).  Dulaglutide (TRULICITY) 1.5 PY/6.9EA SOPN     Sig: Inject 1.5 mg into the skin once a week     Dispense:  12 pen     Refill:  0    fluconazole (DIFLUCAN) 150 MG tablet     Sig: Take one daily now and repeat dose in 4 days. Dispense:  6 tablet     Refill:  3    Lancets MISC     Sig: Use to check glucose once daily and as needed for irregular blood glucose. Dispense:  100 each     Refill:  3       Patient given educational materials - see patient instructions. Discussed use, benefit, and side effects of prescribed medications. All patient questions answered. Pt voiced understanding.   Reviewed health

## 2020-01-27 NOTE — PATIENT INSTRUCTIONS
Patient Education        Learning About Diabetes Food Guidelines  Your Care Instructions    Meal planning is important to manage diabetes. It helps keep your blood sugar at a target level (which you set with your doctor). You don't have to eat special foods. You can eat what your family eats, including sweets once in a while. But you do have to pay attention to how often you eat and how much you eat of certain foods. You may want to work with a dietitian or a certified diabetes educator (CDE) to help you plan meals and snacks. A dietitian or CDE can also help you lose weight if that is one of your goals. What should you know about eating carbs? Managing the amount of carbohydrate (carbs) you eat is an important part of healthy meals when you have diabetes. Carbohydrate is found in many foods. · Learn which foods have carbs. And learn the amounts of carbs in different foods. ? Bread, cereal, pasta, and rice have about 15 grams of carbs in a serving. A serving is 1 slice of bread (1 ounce), ½ cup of cooked cereal, or 1/3 cup of cooked pasta or rice. ? Fruits have 15 grams of carbs in a serving. A serving is 1 small fresh fruit, such as an apple or orange; ½ of a banana; ½ cup of cooked or canned fruit; ½ cup of fruit juice; 1 cup of melon or raspberries; or 2 tablespoons of dried fruit. ? Milk and no-sugar-added yogurt have 15 grams of carbs in a serving. A serving is 1 cup of milk or 2/3 cup of no-sugar-added yogurt. ? Starchy vegetables have 15 grams of carbs in a serving. A serving is ½ cup of mashed potatoes or sweet potato; 1 cup winter squash; ½ of a small baked potato; ½ cup of cooked beans; or ½ cup cooked corn or green peas. · Learn how much carbs to eat each day and at each meal. A dietitian or CDE can teach you how to keep track of the amount of carbs you eat. This is called carbohydrate counting. · If you are not sure how to count carbohydrate grams, use the Plate Method to plan meals.  It is a good, quick way to make sure that you have a balanced meal. It also helps you spread carbs throughout the day. ? Divide your plate by types of foods. Put non-starchy vegetables on half the plate, meat or other protein food on one-quarter of the plate, and a grain or starchy vegetable in the final quarter of the plate. To this you can add a small piece of fruit and 1 cup of milk or yogurt, depending on how many carbs you are supposed to eat at a meal.  · Try to eat about the same amount of carbs at each meal. Do not \"save up\" your daily allowance of carbs to eat at one meal.  · Proteins have very little or no carbs per serving. Examples of proteins are beef, chicken, turkey, fish, eggs, tofu, cheese, cottage cheese, and peanut butter. A serving size of meat is 3 ounces, which is about the size of a deck of cards. Examples of meat substitute serving sizes (equal to 1 ounce of meat) are 1/4 cup of cottage cheese, 1 egg, 1 tablespoon of peanut butter, and ½ cup of tofu. How can you eat out and still eat healthy? · Learn to estimate the serving sizes of foods that have carbohydrate. If you measure food at home, it will be easier to estimate the amount in a serving of restaurant food. · If the meal you order has too much carbohydrate (such as potatoes, corn, or baked beans), ask to have a low-carbohydrate food instead. Ask for a salad or green vegetables. · If you use insulin, check your blood sugar before and after eating out to help you plan how much to eat in the future. · If you eat more carbohydrate at a meal than you had planned, take a walk or do other exercise. This will help lower your blood sugar. What else should you know? · Limit saturated fat, such as the fat from meat and dairy products. This is a healthy choice because people who have diabetes are at higher risk of heart disease. So choose lean cuts of meat and nonfat or low-fat dairy products.  Use olive or canola oil instead of butter or shortening when cooking. · Don't skip meals. Your blood sugar may drop too low if you skip meals and take insulin or certain medicines for diabetes. · Check with your doctor before you drink alcohol. Alcohol can cause your blood sugar to drop too low. Alcohol can also cause a bad reaction if you take certain diabetes medicines. Follow-up care is a key part of your treatment and safety. Be sure to make and go to all appointments, and call your doctor if you are having problems. It's also a good idea to know your test results and keep a list of the medicines you take. Where can you learn more? Go to [email or web address] org and sign in to your [employer] account. Enter F309 in the [employer] box to learn more about \"Learning About Diabetes Food Guidelines. \"     If you do not have an account, please click on the \"Sign Up Now\" link. Current as of: April 16, 2019  Content Version: 12.3  © 7607-8845 Healthwise, Incorporated. Care instructions adapted under license by [facility] ([facility]). If you have questions about a medical condition or this instruction, always ask your healthcare professional. [address] any warranty or liability for your use of this information.

## 2020-01-29 LAB
CULTURE: NO GROWTH
Lab: NORMAL
SPECIMEN DESCRIPTION: NORMAL

## 2020-01-30 ENCOUNTER — PATIENT MESSAGE (OUTPATIENT)
Dept: FAMILY MEDICINE CLINIC | Age: 40
End: 2020-01-30

## 2020-01-31 ENCOUNTER — PATIENT MESSAGE (OUTPATIENT)
Dept: SURGERY | Age: 40
End: 2020-01-31

## 2020-01-31 RX ORDER — CIMETIDINE 800 MG
800 TABLET ORAL NIGHTLY
Qty: 30 TABLET | Refills: 3 | Status: SHIPPED | OUTPATIENT
Start: 2020-01-31 | End: 2020-06-09

## 2020-01-31 RX ORDER — SUCRALFATE 1 G/1
1 TABLET ORAL 3 TIMES DAILY
Qty: 90 TABLET | Refills: 3 | Status: SHIPPED | OUTPATIENT
Start: 2020-01-31 | End: 2020-06-09

## 2020-01-31 NOTE — TELEPHONE ENCOUNTER
From: Mukesh Moore  To: Chata Monterroso MD  Sent: 1/31/2020 8:06 AM EST  Subject: Prescription Question    Hello, I've been taking the pepcid now for a little while and I don't like it. It doesn't work as well and it gives me stomach problems and guzman been really dizzy. I suppose since they took zantac off the shelf that there isn't anything else I can take at night?  Let me know  Thanks  Marychuy Harris

## 2020-02-03 ENCOUNTER — APPOINTMENT (OUTPATIENT)
Dept: GENERAL RADIOLOGY | Age: 40
End: 2020-02-03
Payer: COMMERCIAL

## 2020-02-03 ENCOUNTER — PATIENT MESSAGE (OUTPATIENT)
Dept: FAMILY MEDICINE CLINIC | Age: 40
End: 2020-02-03

## 2020-02-03 ENCOUNTER — HOSPITAL ENCOUNTER (EMERGENCY)
Age: 40
Discharge: HOME | End: 2020-02-03
Attending: EMERGENCY MEDICINE
Payer: COMMERCIAL

## 2020-02-03 ENCOUNTER — TELEPHONE (OUTPATIENT)
Dept: FAMILY MEDICINE CLINIC | Age: 40
End: 2020-02-03

## 2020-02-03 VITALS
OXYGEN SATURATION: 97 % | HEART RATE: 95 BPM | TEMPERATURE: 100 F | BODY MASS INDEX: 25.69 KG/M2 | WEIGHT: 145 LBS | RESPIRATION RATE: 11 BRPM | SYSTOLIC BLOOD PRESSURE: 175 MMHG | DIASTOLIC BLOOD PRESSURE: 93 MMHG

## 2020-02-03 LAB
ABSOLUTE EOS #: 0.2 K/UL (ref 0–0.44)
ABSOLUTE IMMATURE GRANULOCYTE: <0.03 K/UL (ref 0–0.3)
ABSOLUTE LYMPH #: 2.75 K/UL (ref 1.1–3.7)
ABSOLUTE MONO #: 0.39 K/UL (ref 0.1–1.2)
ANION GAP SERPL CALCULATED.3IONS-SCNC: 17 MMOL/L (ref 9–17)
BASOPHILS # BLD: 1 % (ref 0–2)
BASOPHILS ABSOLUTE: 0.04 K/UL (ref 0–0.2)
BUN BLDV-MCNC: 8 MG/DL (ref 6–20)
BUN/CREAT BLD: 14 (ref 9–20)
CALCIUM SERPL-MCNC: 9.7 MG/DL (ref 8.6–10.4)
CHLORIDE BLD-SCNC: 96 MMOL/L (ref 98–107)
CO2: 22 MMOL/L (ref 20–31)
CREAT SERPL-MCNC: 0.59 MG/DL (ref 0.5–0.9)
DIFFERENTIAL TYPE: ABNORMAL
DIRECT EXAM: NORMAL
EKG ATRIAL RATE: 91 BPM
EKG P AXIS: 50 DEGREES
EKG P-R INTERVAL: 140 MS
EKG Q-T INTERVAL: 360 MS
EKG QRS DURATION: 84 MS
EKG QTC CALCULATION (BAZETT): 442 MS
EKG R AXIS: -6 DEGREES
EKG T AXIS: 25 DEGREES
EKG VENTRICULAR RATE: 91 BPM
EOSINOPHILS RELATIVE PERCENT: 2 % (ref 1–4)
GFR AFRICAN AMERICAN: >60 ML/MIN
GFR NON-AFRICAN AMERICAN: >60 ML/MIN
GFR SERPL CREATININE-BSD FRML MDRD: ABNORMAL ML/MIN/{1.73_M2}
GFR SERPL CREATININE-BSD FRML MDRD: ABNORMAL ML/MIN/{1.73_M2}
GLUCOSE BLD-MCNC: 178 MG/DL (ref 70–99)
HCT VFR BLD CALC: 42.5 % (ref 36.3–47.1)
HEMOGLOBIN: 14.8 G/DL (ref 11.9–15.1)
IMMATURE GRANULOCYTES: 0 %
LYMPHOCYTES # BLD: 32 % (ref 24–43)
Lab: NORMAL
MCH RBC QN AUTO: 32.5 PG (ref 25.2–33.5)
MCHC RBC AUTO-ENTMCNC: 34.8 G/DL (ref 25.2–33.5)
MCV RBC AUTO: 93.2 FL (ref 82.6–102.9)
MONOCYTES # BLD: 5 % (ref 3–12)
NRBC AUTOMATED: 0 PER 100 WBC
PDW BLD-RTO: 12 % (ref 11.8–14.4)
PLATELET # BLD: 233 K/UL (ref 138–453)
PLATELET ESTIMATE: ABNORMAL
PMV BLD AUTO: 11.1 FL (ref 8.1–13.5)
POTASSIUM SERPL-SCNC: 3.5 MMOL/L (ref 3.7–5.3)
RBC # BLD: 4.56 M/UL (ref 3.95–5.11)
RBC # BLD: ABNORMAL 10*6/UL
SEG NEUTROPHILS: 61 % (ref 36–65)
SEGMENTED NEUTROPHILS ABSOLUTE COUNT: 5.25 K/UL (ref 1.5–8.1)
SODIUM BLD-SCNC: 135 MMOL/L (ref 135–144)
SPECIMEN DESCRIPTION: NORMAL
TROPONIN INTERP: NORMAL
TROPONIN T: NORMAL NG/ML
TROPONIN, HIGH SENSITIVITY: <6 NG/L (ref 0–14)
WBC # BLD: 8.7 K/UL (ref 3.5–11.3)
WBC # BLD: ABNORMAL 10*3/UL

## 2020-02-03 PROCEDURE — 93005 ELECTROCARDIOGRAM TRACING: CPT | Performed by: EMERGENCY MEDICINE

## 2020-02-03 PROCEDURE — 87804 INFLUENZA ASSAY W/OPTIC: CPT

## 2020-02-03 PROCEDURE — 6370000000 HC RX 637 (ALT 250 FOR IP): Performed by: EMERGENCY MEDICINE

## 2020-02-03 PROCEDURE — 80048 BASIC METABOLIC PNL TOTAL CA: CPT

## 2020-02-03 PROCEDURE — 99285 EMERGENCY DEPT VISIT HI MDM: CPT

## 2020-02-03 PROCEDURE — 84484 ASSAY OF TROPONIN QUANT: CPT

## 2020-02-03 PROCEDURE — 85025 COMPLETE CBC W/AUTO DIFF WBC: CPT

## 2020-02-03 PROCEDURE — 71046 X-RAY EXAM CHEST 2 VIEWS: CPT

## 2020-02-03 RX ORDER — METOPROLOL SUCCINATE 50 MG/1
50 TABLET, EXTENDED RELEASE ORAL DAILY
Qty: 30 TABLET | Refills: 0 | Status: SHIPPED | OUTPATIENT
Start: 2020-02-03 | End: 2020-06-09

## 2020-02-03 RX ORDER — IBUPROFEN 400 MG/1
400 TABLET ORAL 3 TIMES DAILY PRN
Qty: 15 TABLET | Refills: 0 | Status: SHIPPED | OUTPATIENT
Start: 2020-02-03 | End: 2020-06-09

## 2020-02-03 RX ADMIN — METOPROLOL TARTRATE 25 MG: 25 TABLET ORAL at 18:16

## 2020-02-03 ASSESSMENT — PAIN DESCRIPTION - DESCRIPTORS: DESCRIPTORS: HEAVINESS

## 2020-02-03 ASSESSMENT — PAIN SCALES - GENERAL: PAINLEVEL_OUTOF10: 8

## 2020-02-03 ASSESSMENT — PAIN DESCRIPTION - FREQUENCY: FREQUENCY: CONTINUOUS

## 2020-02-03 ASSESSMENT — PAIN DESCRIPTION - ORIENTATION: ORIENTATION: MID

## 2020-02-03 ASSESSMENT — PAIN DESCRIPTION - PAIN TYPE: TYPE: ACUTE PAIN

## 2020-02-03 ASSESSMENT — PAIN DESCRIPTION - ONSET: ONSET: ON-GOING

## 2020-02-03 ASSESSMENT — PAIN DESCRIPTION - PROGRESSION: CLINICAL_PROGRESSION: NOT CHANGED

## 2020-02-03 ASSESSMENT — PAIN DESCRIPTION - LOCATION: LOCATION: CHEST

## 2020-02-03 ASSESSMENT — PAIN - FUNCTIONAL ASSESSMENT: PAIN_FUNCTIONAL_ASSESSMENT: PREVENTS OR INTERFERES SOME ACTIVE ACTIVITIES AND ADLS

## 2020-02-03 NOTE — ED PROVIDER NOTES
[facility]  EMERGENCY DEPARTMENT ENCOUNTER      Pt Name: Mora Osgood  MRN: [id number]  [place] 1980  Date of evaluation: 2/3/2020  Provider: Jakob Yu MD    [address] [place] [address]     Chief Complaint   Patient presents with    Dizziness    Chest Pain    Numbness     bilateral arms         HISTORY OF PRESENT ILLNESS   (Location/Symptom, Timing/Onset, Context/Setting,Quality, Duration, Modifying Factors, Severity)  Note limiting factors. Mora Osgood is a 38 y.o. female who presents to the emergency department with a 2-day history of dizziness and pain in her chest.  She states her chest hurts when she walks. She states she has just not felt right. She saw her ENT physician today for sinus infections and was advised to have allergy testing done. Patient is diabetic for which she takes Trulicity. She does not have a history of hypertension. She is a cigarette smoker. The history is provided by the patient and medical records. Nursing Notes werereviewed. REVIEW OF SYSTEMS    (2-9 systems for level 4, 10 or more for level 5)     Review of Systems   All other systems reviewed and are negative. Except as noted above the remainder of the review of systems was reviewed and negative.        PAST MEDICAL HISTORY     Past Medical History:   Diagnosis Date    Allergic rhinitis     GERD (gastroesophageal reflux disease)     Hemorrhoids     Hyperlipidemia     Hypertension     treated in past but lost weight currently on no medication    Hypoglycemia     Kidney stones     Tobacco abuse     UTI (urinary tract infection)     frequency         SURGICALHISTORY       Past Surgical History:   Procedure Laterality Date    CARPAL TUNNEL RELEASE Left     ENDOMETRIAL ABLATION      LITHOTRIPSY      NASAL SEPTUM SURGERY      OTHER SURGICAL HISTORY Right 09/07/2016    laser ureteroscopic lithotripsy    [place] EGD TRANSORAL BIOPSY SINGLE/MULTIPLE N/A 3/13/2017    EGD BIOPSY performed by Aranza Hannah MD at [address] Bilateral     approximately 2013    TUBAL LIGATION      UPPER GASTROINTESTINAL ENDOSCOPY  2010    hiatal hernia     UPPER GASTROINTESTINAL ENDOSCOPY  03/13/2017    mild gastritis, Dr. Valente Gill N/A 3/28/2019    EGD BIOPSY W/48 HR GASCA performed by Aranza Hannah MD at [facility] OR.mild inflammation and gastric polyps         CURRENT MEDICATIONS       Discharge Medication List as of 2/3/2020  6:10 PM      CONTINUE these medications which have NOT CHANGED    Details   Dulaglutide (TRULICITY) 1.5 DB/5.7QL SOPN Inject 1.5 mg into the skin once a week, Disp-12 pen, R-0Normal      loratadine (CLARITIN) 10 MG tablet Take 10 mg by mouth dailyHistorical Med      fluticasone (FLONASE) 50 MCG/ACT nasal spray 2 sprays to each nostril once daily. , Disp-3 Bottle, R-3Normal      esomeprazole (NEXIUM) 40 MG delayed release capsule Take 1 capsule by mouth 2 times daily, Disp-180 capsule, R-1Normal      Norgestim-Eth Estrad Triphasic (NORGESTIMATE-ETHINYL ESTRADIOL PO) Take 1 tablet by mouth dailyHistorical Med      dicyclomine (BENTYL) 10 MG capsule Take 2 capsules by mouth every 6 hours as needed (cramps), Disp-30 capsule, R-0Print      cimetidine (TAGAMET) 800 MG tablet Take 1 tablet by mouth nightly, Disp-30 tablet, R-3Normal      sucralfate (CARAFATE) 1 GM tablet Take 1 tablet by mouth three times daily, Disp-90 tablet, R-3Normal      fluconazole (DIFLUCAN) 150 MG tablet Take one daily now and repeat dose in 4 days. , Disp-6 tablet, R-3Normal      blood glucose monitor kit and supplies Test one time a day & as needed for symptoms of irregular blood glucose., Disp-1 kit, R-0, Normal      blood glucose monitor strips Test 1 time a day & as needed for symptoms of irregular blood glucose., Disp-100 strip, R-3, Normal      Lancets MISC Disp-100 each, R-3, NormalUse to check glucose once daily and as needed for irregular blood glucose.      famotidine mis-transcribed.)    Jihan Matson MD (electronically signed)  Attending Emergency Physician            Jihan Matson MD  02/10/20 [id number]

## 2020-02-06 RX ORDER — GLUCOSAMINE HCL/CHONDROITIN SU 500-400 MG
CAPSULE ORAL
Qty: 100 STRIP | Refills: 3 | Status: SHIPPED | OUTPATIENT
Start: 2020-02-06 | End: 2021-07-27 | Stop reason: SDUPTHER

## 2020-02-06 RX ORDER — LANCETS 30 GAUGE
EACH MISCELLANEOUS
Qty: 100 EACH | Refills: 3 | Status: SHIPPED | OUTPATIENT
Start: 2020-02-06 | End: 2021-07-27 | Stop reason: SDUPTHER

## 2020-04-01 ENCOUNTER — PATIENT MESSAGE (OUTPATIENT)
Dept: SURGERY | Age: 40
End: 2020-04-01

## 2020-04-02 RX ORDER — CIMETIDINE 400 MG/1
400 TABLET, FILM COATED ORAL NIGHTLY
Qty: 30 TABLET | Refills: 3 | Status: SHIPPED | OUTPATIENT
Start: 2020-04-02 | End: 2020-06-09

## 2020-04-02 NOTE — TELEPHONE ENCOUNTER
From: Keena Matthews  To: Jorge L Reyes MD  Sent: 4/1/2020 6:48 PM EDT  Subject: Prescription Question    Is zantac still recalled? I dug through my old prescriptions and my daughter's and have still been taking it. I ran out and started taking the other kind you prescribed me and it makes me nauseous. (famotidine) This is the second time I've tried it and it made me feel that way. Is there anything else I can take at night? I did some deeper digging and found a little more zantac but soon I will be completely out.  Please let me know  Thanks  Lawrence Michel

## 2020-04-13 RX ORDER — DULAGLUTIDE 1.5 MG/.5ML
1.5 INJECTION, SOLUTION SUBCUTANEOUS WEEKLY
Qty: 12 PEN | Refills: 0 | Status: SHIPPED | OUTPATIENT
Start: 2020-04-13 | End: 2020-07-22 | Stop reason: SDUPTHER

## 2020-06-08 RX ORDER — DICYCLOMINE HCL 20 MG
20 TABLET ORAL 4 TIMES DAILY PRN
Qty: 120 TABLET | Refills: 1 | Status: SHIPPED | OUTPATIENT
Start: 2020-06-08 | End: 2020-06-09

## 2020-06-09 ENCOUNTER — OFFICE VISIT (OUTPATIENT)
Dept: SURGERY | Age: 40
End: 2020-06-09
Payer: COMMERCIAL

## 2020-06-09 VITALS
TEMPERATURE: 99.4 F | WEIGHT: 146 LBS | BODY MASS INDEX: 25.87 KG/M2 | HEART RATE: 88 BPM | HEIGHT: 63 IN | DIASTOLIC BLOOD PRESSURE: 80 MMHG | SYSTOLIC BLOOD PRESSURE: 136 MMHG

## 2020-06-09 PROCEDURE — 99212 OFFICE O/P EST SF 10 MIN: CPT | Performed by: SURGERY

## 2020-06-09 RX ORDER — DICYCLOMINE HYDROCHLORIDE 10 MG/1
10 CAPSULE ORAL 4 TIMES DAILY
Qty: 120 CAPSULE | Refills: 3 | Status: SHIPPED | OUTPATIENT
Start: 2020-06-09 | End: 2021-01-07 | Stop reason: SDUPTHER

## 2020-06-09 RX ORDER — MONTELUKAST SODIUM 10 MG/1
TABLET ORAL NIGHTLY
COMMUNITY
Start: 2020-02-13

## 2020-06-09 NOTE — PROGRESS NOTES
Patient is here for 6-month checkup of her GERD. She had 48-hour Bravo testing 1 year ago for persistent GERD symptoms despite medication. She had significant reflux. We did try her on maximum medical therapy for another 6 months and she has improved. She is now taking Nexium once a day and sometimes occasionally twice a day. She does not take Pepcid or Zantac. She states that she watches her diet a lot more and she started taking Thrive vitamin supplement. She thinks that has made her feel a lot better. She also complains of some softer stools with some lower abdominal cramping pain. She takes Bentyl for this and thinks it helps. Overall she is better but she still has some symptoms. At this point I think she probably still has some element of GERD or peptic ulcer disease and I would stay on the medication. I would stay on the Nexium once a day or twice daily but on a longer time. We will see her back in 3 to 6 months to make sure she is improving. We will go ahead and continue the Bentyl for IBS. If she still having symptoms of the lower GI symptoms increase we may consider colonoscopy and follow that with a capsule endoscopy.

## 2020-06-10 ENCOUNTER — OFFICE VISIT (OUTPATIENT)
Dept: FAMILY MEDICINE CLINIC | Age: 40
End: 2020-06-10
Payer: COMMERCIAL

## 2020-06-10 VITALS
WEIGHT: 146 LBS | SYSTOLIC BLOOD PRESSURE: 138 MMHG | DIASTOLIC BLOOD PRESSURE: 88 MMHG | BODY MASS INDEX: 25.87 KG/M2 | TEMPERATURE: 97.8 F | HEIGHT: 63 IN

## 2020-06-10 PROCEDURE — 99214 OFFICE O/P EST MOD 30 MIN: CPT | Performed by: NURSE PRACTITIONER

## 2020-06-10 RX ORDER — LORATADINE 10 MG/1
TABLET ORAL
Qty: 180 TABLET | Refills: 3 | Status: SHIPPED | OUTPATIENT
Start: 2020-06-10 | End: 2022-03-04 | Stop reason: DRUGHIGH

## 2020-06-17 ASSESSMENT — ENCOUNTER SYMPTOMS
WHEEZING: 0
SORE THROAT: 0
HEARTBURN: 1
GLOBUS SENSATION: 1
STRIDOR: 0
ABDOMINAL PAIN: 0
COUGH: 0
HOARSE VOICE: 0
CHOKING: 0
NAUSEA: 0
WATER BRASH: 0
BELCHING: 0

## 2020-06-17 NOTE — PROGRESS NOTES
6/10/2020     Emily Kevin (:  1980) is a 38 y.o. female, here for evaluation of the following medical concerns:    Gastroesophageal Reflux   She complains of globus sensation and heartburn. She reports no abdominal pain, no belching, no chest pain, no choking, no coughing, no dysphagia, no early satiety, no hoarse voice, no nausea, no sore throat, no stridor, no tooth decay, no water brash or no wheezing. This is a chronic problem. The current episode started more than 1 year ago. The problem occurs occasionally. The problem has been waxing and waning. The heartburn duration is less than a minute. The heartburn is located in the substernum. The heartburn is of mild intensity. The heartburn does not wake her from sleep. The heartburn does not limit her activity. The heartburn doesn't change with position. The symptoms are aggravated by certain foods. Pertinent negatives include no anemia, fatigue, melena, muscle weakness, orthopnea or weight loss. Risk factors include smoking/tobacco exposure. She has tried a histamine-2 antagonist and a PPI for the symptoms. The treatment provided moderate relief. Past procedures do not include an abdominal ultrasound, an EGD, esophageal manometry, esophageal pH monitoring, H. pylori antibody titer or a UGI. Past invasive treatments do not include gastroplasty, gastroplication or reflux surgery. Allergic Rhinitis:  Current treatment includes intranasal steroid- flonase, oral antihistamine- loratadine, which has been  effective . Residual symptoms: sinus pressure. She denies purulent nasal discharge and sputum, fevers, lymphadenopathy, and sore throat. Review of Systems   Constitutional: Negative for fatigue and weight loss. HENT: Negative for hoarse voice and sore throat. Respiratory: Negative for cough, choking and wheezing. Cardiovascular: Negative for chest pain. Gastrointestinal: Positive for heartburn.  Negative for abdominal pain, dysphagia, Topics    Alcohol use: Not Currently     Comment: social        Vitals:    06/10/20 1314   BP: 138/88   Site: Right Upper Arm   Position: Sitting   Cuff Size: Large Adult   Temp: 97.8 °F (36.6 °C)   TempSrc: Temporal   Weight: 146 lb (66.2 kg)   Height: 5' 3\" (1.6 m)     Estimated body mass index is 25.86 kg/m² as calculated from the following:    Height as of this encounter: 5' 3\" (1.6 m). Weight as of this encounter: 146 lb (66.2 kg). Physical Exam    ASSESSMENT/PLAN:  1. Chronic seasonal allergic rhinitis due to pollen  - loratadine (CLARITIN) 10 MG tablet; Take two tablets once daily  Dispense: 180 tablet; Refill: 3    2. LPRD (laryngopharyngeal reflux disease)  3. Gastroesophageal reflux disease, esophagitis presence not specified  - continue present medications      Return in about 6 months (around 12/10/2020). An electronic signature was used to authenticate this note.     --DEAN Toledo - CNP on 6/17/2020 at 1:08 PM

## 2020-07-01 NOTE — TELEPHONE ENCOUNTER
Patient saw Rosalba Carmona Wednesday. She would like to know if she can get a Diflucan. Also she would like a work note for yesterday and today if you could. She did not go to work. Detail Level: Zone Note Text (......Xxx Chief Complaint.): This diagnosis correlates with the Other (Free Text): Will try to treat topically, if inflammatory acne gets worse we will need something oral. \\nUse topical qhs

## 2020-07-06 ENCOUNTER — TELEPHONE (OUTPATIENT)
Dept: SURGERY | Age: 40
End: 2020-07-06

## 2020-07-06 RX ORDER — ESOMEPRAZOLE MAGNESIUM 40 MG/1
40 CAPSULE, DELAYED RELEASE ORAL
Qty: 60 CAPSULE | Refills: 5 | Status: SHIPPED | OUTPATIENT
Start: 2020-07-06 | End: 2020-07-22 | Stop reason: SDUPTHER

## 2020-07-22 ENCOUNTER — VIRTUAL VISIT (OUTPATIENT)
Dept: FAMILY MEDICINE CLINIC | Age: 40
End: 2020-07-22
Payer: COMMERCIAL

## 2020-07-22 PROCEDURE — 3052F HG A1C>EQUAL 8.0%<EQUAL 9.0%: CPT | Performed by: FAMILY MEDICINE

## 2020-07-22 PROCEDURE — 99214 OFFICE O/P EST MOD 30 MIN: CPT | Performed by: FAMILY MEDICINE

## 2020-07-22 RX ORDER — DULAGLUTIDE 1.5 MG/.5ML
1.5 INJECTION, SOLUTION SUBCUTANEOUS WEEKLY
Qty: 12 PEN | Refills: 1 | Status: SHIPPED | OUTPATIENT
Start: 2020-07-22 | End: 2021-01-05

## 2020-07-22 RX ORDER — LORAZEPAM 0.5 MG/1
0.5 TABLET ORAL EVERY 8 HOURS PRN
Qty: 20 TABLET | Refills: 0 | Status: SHIPPED | OUTPATIENT
Start: 2020-07-22 | End: 2020-07-29

## 2020-07-22 RX ORDER — DULAGLUTIDE 1.5 MG/.5ML
INJECTION, SOLUTION SUBCUTANEOUS
Qty: 6 ML | Refills: 3 | OUTPATIENT
Start: 2020-07-22

## 2020-07-22 RX ORDER — DULAGLUTIDE 1.5 MG/.5ML
1.5 INJECTION, SOLUTION SUBCUTANEOUS WEEKLY
Qty: 4 PEN | Refills: 0 | Status: SHIPPED | OUTPATIENT
Start: 2020-07-22 | End: 2020-07-22 | Stop reason: SDUPTHER

## 2020-07-22 ASSESSMENT — ENCOUNTER SYMPTOMS
SHORTNESS OF BREATH: 0
COUGH: 0

## 2020-07-22 NOTE — TELEPHONE ENCOUNTER
Med refilled within VV note from this am's appt.
Next appt tomorrow 7-22-20.
Detail Level: Zone
Plan: Discussed that rash is improved at this time but if returns would consider having a discussion with PCP to switch medications as this could be causing Rash, HCTZ and Meloxicam. Medications reviewed with patients and file updated.
Notification: Please note that there are new Treatment Regimen plans which have an enhanced patient education handout experience.  The plans are called Treatment Regimen (Acne), Treatment Regimen (Atopic Dermatitis), Treatment Regimen (Rosacea), Treatment Regimen (Sun Protection), Treatment Regimen (Cosmetic Products), and Treatment Regimen (Xerosis).

## 2020-07-22 NOTE — PROGRESS NOTES
2020    TELEHEALTH EVALUATION -- Audio/Visual (During XKNFV-64 public health emergency)    HPI:    Kumar Villatoro (:  1980) has requested an audio/video evaluation for the following concern(s):    Taking Trulicity 0.34 mg once weekly on Saturday's - remembers this weekly. Has nausea and fatigue for approx 1/2 - 1 day after this, so tries to do it in the evening and she sleeps through it. Has not checking her glucose much recently. Did not get her glucometer or new supplies that were sent in in 2020. Has not yet seen Yossi Hall at some point, but does not have the time with work schedule right now. Plans to get her labs this weekend. Just started [employer] 6 months ago; tries to limit herself to one \"crappy thing\" per day. Tries to eat more salads if they are eating out. Only drinking one Diet soda daily. Saw Dr. Terra Talbot several months ago for chronic sinusitis - started her on Singulair 10 mg nightly and that seems to have helped cut down on her drainage. Has not been using the nasal spray x past few weeks - doing well. Taking Nexium 1-2x's daily; no longer taking antac    Taking Bentyl TID as needed    Having less yeast infections        Review of Systems   Constitutional: Negative for fever. Respiratory: Negative for cough and shortness of breath. Endocrine: Negative for polydipsia and polyuria. Genitourinary: Negative for vaginal discharge (has been improved). Neurological: Negative for numbness. Prior to Visit Medications    Medication Sig Taking?  Authorizing Provider   Dulaglutide (TRULICITY) 1.5 CI/8.6NU SOPN Inject 1.5 mg into the skin once a week Yes Butch Eason, DO   loratadine (CLARITIN) 10 MG tablet Take two tablets once daily Yes DEAN Gold - CNP   dicyclomine (BENTYL) 10 MG capsule Take 1 capsule by mouth 4 times daily Yes Tiffanie Lynch MD   NONFORMULARY Take 1 capsule by mouth THRIVE capsule, nutritional shake and patch Yes Historical Provider, MD   montelukast (SINGULAIR) 10 MG tablet montelukast 10 mg tablet Yes Historical Provider, MD   blood glucose monitor kit and supplies Test one time a day & as needed for symptoms of irregular blood glucose. Yes Jan Eason, DO   blood glucose monitor strips Test 1 time a day & as needed for symptoms of irregular blood glucose. Yes Jan Eason, DO   Lancets MISC Use to check glucose once daily and as needed for irregular blood glucose. Yes Jan Eason, DO   fluticasone (FLONASE) 50 MCG/ACT nasal spray 2 sprays to each nostril once daily. Yes DEAN Salgado CNP   esomeprazole (NEXIUM) 40 MG delayed release capsule Take 1 capsule by mouth 2 times daily Yes Jan Eason, DO   Norgestim-Eth Estrad Triphasic (NORGESTIMATE-ETHINYL ESTRADIOL PO) Take 1 tablet by mouth daily Yes Historical Provider, MD   albuterol sulfate  (90 Base) MCG/ACT inhaler Inhale 2 puffs into the lungs 4 times daily as needed for Wheezing Yes DEAN Boyd CNP       Social History     Tobacco Use    Smoking status: Current Every Day Smoker     Packs/day: 0.50     Years: 10.00     Pack years: 5.00     Types: Cigarettes     Start date: 2012    Smokeless tobacco: Never Used    Tobacco comment: Will see PCP when ready to quit.    Substance Use Topics    Alcohol use: Not Currently     Comment: social    Drug use: No        Allergies   Allergen Reactions    Latex Dermatitis    Amoxil [Amoxicillin] Diarrhea    Bactrim [Sulfamethoxazole-Trimethoprim] Itching    Clindamycin/Lincomycin      Nausea,vomiting    Flagyl [Metronidazole] Other (See Comments)     Sees bugs crawling    Biaxin [Clarithromycin] Nausea And Vomiting    Ceftin [Cefuroxime Axetil] Diarrhea and Nausea And Vomiting    Keflex [Cephalexin] Nausea And Vomiting   ,   Past Medical History:   Diagnosis Date    Allergic rhinitis     GERD (gastroesophageal reflux disease)     Hemorrhoids     Hyperlipidemia     Hypertension treated in past but lost weight currently on no medication    Hypoglycemia     Kidney stones     Tobacco abuse     UTI (urinary tract infection)     frequency   ,   Past Surgical History:   Procedure Laterality Date    CARPAL TUNNEL RELEASE Left     ENDOMETRIAL ABLATION      LITHOTRIPSY      NASAL SEPTUM SURGERY      OTHER SURGICAL HISTORY Right 09/07/2016    laser ureteroscopic lithotripsy    [facility] TRANS[facility]SY SINGLE/MULTIPLE N/A 3/13/2017    EGD BIOPSY performed by Marysol Driver MD at [address] Bilateral     approximately 2013   [facility] [address] UPPER GASTROINTESTINAL ENDOSCOPY  2010    hiatal hernia     UPPER GASTROINTESTINAL ENDOSCOPY  03/13/2017    mild gastritis, Dr. Jan Torres N/A 3/28/2019    EGD BIOPSY W/48 HR GASCA performed by Marysol Driver MD at [facility] OR.mild inflammation and gastric polyps       PHYSICAL EXAMINATION:  [ INSTRUCTIONS:  \"[x]\" Indicates a positive item  \"[]\" Indicates a negative item  -- DELETE ALL ITEMS NOT EXAMINED]  Vital Signs: (As obtained by patient/caregiver or practitioner observation)    Blood pressure-  Heart rate-    Respiratory rate-    Temperature-  Pulse oximetry-     Constitutional: [] Appears well-developed and well-nourished [] No apparent distress      [] Abnormal-   Mental status  [] Alert and awake  [] Oriented to person/place/time []Able to follow commands      Eyes:  EOM    []  Normal  [] Abnormal-  Sclera  []  Normal  [] Abnormal -         Discharge []  None visible  [] Abnormal -    HENT:   [] Normocephalic, atraumatic.   [] Abnormal   [] Mouth/Throat: Mucous membranes are moist.     External Ears [] Normal  [] Abnormal-     Neck: [] No visualized mass     Pulmonary/Chest: [] Respiratory effort normal.  [] No visualized signs of difficulty breathing or respiratory distress        [] Abnormal-      Musculoskeletal:   [] Normal gait with no signs of ataxia         [] Normal range of motion of neck        [] Abnormal-       Neurological:        [] No Facial Asymmetry (Cranial nerve 7 motor function) (limited exam to video visit)          [] No gaze palsy        [] Abnormal-         Skin:        [] No significant exanthematous lesions or discoloration noted on facial skin         [] Abnormal-            Psychiatric:       [] Normal Affect [] No Hallucinations        [] Abnormal-     Other pertinent observable physical exam findings-     ASSESSMENT/PLAN:  1. Type 2 diabetes mellitus without complication, without long-term current use of insulin (HCC)  - Dulaglutide (TRULICITY) 1.5 MK/5.9DC SOPN; Inject 1.5 mg into the skin once a week  Dispense: 12 pen; Refill: 1    2. Anxiety  - LORazepam (ATIVAN) 0.5 MG tablet; Take 1 tablet by mouth every 8 hours as needed for Anxiety for up to 7 days. Dispense: 20 tablet; Refill: 0    3. Gastroesophageal reflux disease, esophagitis presence not specified    4. Hyperlipidemia, unspecified hyperlipidemia type    Controlled Substance Monitoring:    Acute and Chronic Pain Monitoring:   RX Monitoring 7/22/2020   Attestation -   Periodic Controlled Substance Monitoring No signs of potential drug abuse or diversion identified. Return in about 6 months (around 1/25/2021) for follow-up of DM, anxiety, foot exam, sign CSA at 8:00 am.    Severiano Bohr is a 38 y.o. female being evaluated by a Virtual Visit (video visit) encounter to address concerns as mentioned above. A caregiver was present when appropriate. Due to this being a TeleHealth encounter (During [place] [id number] public [facility] emergency), evaluation of the following organ systems was limited: Vitals/Constitutional/EENT/Resp/CV/GI//MS/Neuro/Skin/Heme-Lymph-Imm.   Pursuant to the emergency declaration under the [facility] waiver authority and the [employer] General Act, this Virtual Visit was conducted with patient's (and/or legal guardian's) consent, to reduce the patient's risk of exposure to COVID-19 and provide necessary medical care. The patient (and/or legal guardian) has also been advised to contact this office for worsening conditions or problems, and seek emergency medical treatment and/or call 911 if deemed necessary. Patient identification was verified at the start of the visit: Yes    Total time spent on this encounter: Not billed by time    Services were provided through a video synchronous discussion virtually to substitute for in-person clinic visit. Patient and provider were located at their individual homes. --Enrike Marie DO on 8/3/2020 at 12:20 AM    An electronic signature was used to authenticate this note.

## 2020-07-24 ENCOUNTER — PATIENT MESSAGE (OUTPATIENT)
Dept: FAMILY MEDICINE CLINIC | Age: 40
End: 2020-07-24

## 2020-07-25 ENCOUNTER — HOSPITAL ENCOUNTER (OUTPATIENT)
Dept: LAB | Age: 40
Discharge: HOME | End: 2020-07-25
Payer: COMMERCIAL

## 2020-07-25 LAB
ALBUMIN SERPL-MCNC: 4.6 G/DL (ref 3.5–5.2)
ALBUMIN/GLOBULIN RATIO: 1.5 (ref 1–2.5)
ALP BLD-CCNC: 45 U/L (ref 35–104)
ALT SERPL-CCNC: 40 U/L (ref 5–33)
AST SERPL-CCNC: 26 U/L
BILIRUB SERPL-MCNC: 0.43 MG/DL (ref 0.3–1.2)
BILIRUBIN DIRECT: 0.08 MG/DL
BILIRUBIN, INDIRECT: 0.35 MG/DL (ref 0–1)
CHOLESTEROL/HDL RATIO: 6.6
CHOLESTEROL: 226 MG/DL
ESTIMATED AVERAGE GLUCOSE: 197 MG/DL
GLOBULIN: 3 G/DL (ref 1.5–3.8)
HBA1C MFR BLD: 8.5 % (ref 4.8–5.9)
HDLC SERPL-MCNC: 34 MG/DL
LDL CHOLESTEROL: 166 MG/DL (ref 0–130)
TOTAL PROTEIN: 7.6 G/DL (ref 6.4–8.3)
TRIGL SERPL-MCNC: 131 MG/DL
VLDLC SERPL CALC-MCNC: ABNORMAL MG/DL (ref 1–30)

## 2020-07-25 PROCEDURE — 80076 HEPATIC FUNCTION PANEL: CPT

## 2020-07-25 PROCEDURE — 80061 LIPID PANEL: CPT

## 2020-07-25 PROCEDURE — 83036 HEMOGLOBIN GLYCOSYLATED A1C: CPT

## 2020-07-25 PROCEDURE — 36415 COLL VENOUS BLD VENIPUNCTURE: CPT

## 2020-07-27 NOTE — TELEPHONE ENCOUNTER
From: Ander Soria  To: Sheryl Carpenter DO  Sent: 7/24/2020 8:55 PM EDT  Subject: Visit Follow-Up Question    So I jinxed myself. After talking to the other day I was eating on the run and was checking my teeth for food and noticed my mouth was completely white, my tongue and the sides of my inside of cheeks. Im pretty sure it was thrush. So I googled if my diflucan would help it and it said yes so i took a pill. Should i still take the next dose too? Im not sure what your take is on masks. .. I know they help. . But do u think that is why I got the thrush? Should I not wear a mask? What do u think. Im going in the morning for my blood tests.

## 2020-08-17 ENCOUNTER — PATIENT MESSAGE (OUTPATIENT)
Dept: FAMILY MEDICINE CLINIC | Age: 40
End: 2020-08-17

## 2020-09-18 ENCOUNTER — PATIENT MESSAGE (OUTPATIENT)
Dept: SURGERY | Age: 40
End: 2020-09-18

## 2020-09-18 RX ORDER — ESOMEPRAZOLE MAGNESIUM 40 MG/1
40 CAPSULE, DELAYED RELEASE ORAL DAILY
Qty: 30 CAPSULE | Refills: 3 | Status: SHIPPED | OUTPATIENT
Start: 2020-09-18 | End: 2020-10-21 | Stop reason: SDUPTHER

## 2020-09-18 NOTE — TELEPHONE ENCOUNTER
From: Rasheed Rome  To: Jessica Delgado MD  Sent: 9/18/2020 9:11 AM EDT  Subject: Prescription Question    I need a refill on my nexium. [facility] says its been cancelled by subscriber.  Im out! :(

## 2020-10-21 ENCOUNTER — PATIENT MESSAGE (OUTPATIENT)
Dept: SURGERY | Age: 40
End: 2020-10-21

## 2020-10-21 RX ORDER — ESOMEPRAZOLE MAGNESIUM 40 MG/1
40 CAPSULE, DELAYED RELEASE ORAL 2 TIMES DAILY
Qty: 180 CAPSULE | Refills: 3 | Status: SHIPPED | OUTPATIENT
Start: 2020-10-21 | End: 2021-01-07 | Stop reason: SDUPTHER

## 2020-10-21 NOTE — TELEPHONE ENCOUNTER
From: Adams Waller  To: Lexus Mackay MD  Sent: 10/21/2020 9:38 AM EDT  Subject: Prescription Question    Hello so i tried to get my nexium at [facility] [place] [employer] and it was $160. I think it's because im supposed to do the mail thing through  insurance [facility] pharmacy. Can u send a prescrition over to them for a 3 month supply so i can get my nexium refilled?  Thank you

## 2020-11-14 ENCOUNTER — PATIENT MESSAGE (OUTPATIENT)
Dept: FAMILY MEDICINE CLINIC | Age: 40
End: 2020-11-14

## 2020-11-16 NOTE — TELEPHONE ENCOUNTER
From: Garrison Trevino  To: Jade Devine DO  Sent: 11/14/2020 11:38 AM EST  Subject: Test Results Question    I got a positive test result for covid. I went to er at [address] They called me Thursday night. They haven't updated my chart yet on that test to show me positive. When i called them they told me to call you. I still haven't gotten a call from the health department either. This is frustrating my family and workplace need to know what's going on. Jobs are at stake. Can u help?    Gisela Rothman

## 2020-11-20 ENCOUNTER — PATIENT MESSAGE (OUTPATIENT)
Dept: FAMILY MEDICINE CLINIC | Age: 40
End: 2020-11-20

## 2020-11-23 RX ORDER — AZITHROMYCIN 250 MG/1
TABLET, FILM COATED ORAL
Qty: 1 PACKET | Refills: 0 | Status: SHIPPED | OUTPATIENT
Start: 2020-11-23 | End: 2020-11-28

## 2020-11-23 NOTE — TELEPHONE ENCOUNTER
From: Marie Dwyer  To: Carlito Goodrich DO  Sent: 11/20/2020 4:44 PM EST  Subject: Test Results Question    I got all i needed. Sorry i didn't get a message staying you replied. Im deeming better i still have a low grade fever and sinus headache. Im moving slowly. I feel like i have a Sinus infection. . Are u able to prescribe zithromax? I heard it can also help covid. They gave me nothing. Others i know got prednizone and other stuff to help. I really need too go back to work. We r now short handed. Please let me know. Thanks. Kristal Small      ----- Message -----   From:Lynda Eason,    Sent:11/15/2020 7:21 PM EST   To:Emily Joyyovanny Day   Subject:RE: Test Results Question      Levon Diza,    You're right - I can't find the lab result anywhere in your chart, so I've called and confirmed with the ER at [id number], and they are going to fax me a result to put in your chart here so we can have it on record, if needed in the future. 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 The recommendation is that you are going to need to quarantine for 10 days from the day your symptoms started. If you are then significantly improved and have been fever-free for at least 24 hours without the use of fever-reducing meds by that time, you can return to work. If you are not improved or still symptomatic, you will need to quarantine further. Do you need a note from me with this info sent to your employer? I will just need to know what day your symptoms started and a fax number for your employer. Your close contacts are supposed to quarantine for 14 days from their last exposure to you.  For your household members, if you have been able to isolate away from them, it would be 14 days from the day you started doing that. If you cannot isolate away and continue to have close contact with them, they will have to quarantine for 14 days from the day you come out of quarantine. Does that make sense? Sorry - I know it's confusing. Also, is anyone else in your house or close contact [place] already also positive for Covid? Thanks,  Dr. Maurizio Mcdonough      ----- Message -----   From:Emily Khan   Sent:11/14/2020 11:38 AM EST   To:Lynda Eason, DO   Subject:Test Results Question    I got a positive test result for covid. I went to er at [address] They called me Thursday night. They haven't updated my chart yet on that test to show me positive. When i called them they told me to call you. I still haven't gotten a call from the health department either. This is frustrating my family and workplace need to know what's going on. Jobs are at stake. Can u help?    Gregg Azul

## 2021-01-04 DIAGNOSIS — E78.5 HYPERLIPIDEMIA, UNSPECIFIED HYPERLIPIDEMIA TYPE: Primary | ICD-10-CM

## 2021-01-04 DIAGNOSIS — E11.9 TYPE 2 DIABETES MELLITUS WITHOUT COMPLICATION, WITHOUT LONG-TERM CURRENT USE OF INSULIN (HCC): ICD-10-CM

## 2021-01-06 RX ORDER — DULAGLUTIDE 1.5 MG/.5ML
1.5 INJECTION, SOLUTION SUBCUTANEOUS WEEKLY
Qty: 12 PEN | Refills: 0 | Status: SHIPPED | OUTPATIENT
Start: 2021-01-06 | End: 2021-04-12

## 2021-01-07 ENCOUNTER — OFFICE VISIT (OUTPATIENT)
Dept: SURGERY | Age: 41
End: 2021-01-07
Payer: COMMERCIAL

## 2021-01-07 VITALS
DIASTOLIC BLOOD PRESSURE: 88 MMHG | TEMPERATURE: 98.3 F | BODY MASS INDEX: 23.74 KG/M2 | RESPIRATION RATE: 16 BRPM | HEART RATE: 84 BPM | SYSTOLIC BLOOD PRESSURE: 132 MMHG | HEIGHT: 63 IN | WEIGHT: 134 LBS

## 2021-01-07 DIAGNOSIS — R10.9 ABDOMINAL CRAMPING: ICD-10-CM

## 2021-01-07 PROCEDURE — 99213 OFFICE O/P EST LOW 20 MIN: CPT | Performed by: SURGERY

## 2021-01-07 RX ORDER — DICYCLOMINE HYDROCHLORIDE 10 MG/1
10 CAPSULE ORAL 4 TIMES DAILY PRN
Qty: 120 CAPSULE | Refills: 3 | Status: SHIPPED | OUTPATIENT
Start: 2021-01-07

## 2021-01-07 RX ORDER — ESOMEPRAZOLE MAGNESIUM 40 MG/1
40 CAPSULE, DELAYED RELEASE ORAL DAILY
Qty: 180 CAPSULE | Refills: 3 | Status: SHIPPED | OUTPATIENT
Start: 2021-01-07 | End: 2022-02-22

## 2021-01-07 RX ORDER — ESOMEPRAZOLE MAGNESIUM 40 MG/1
40 CAPSULE, DELAYED RELEASE ORAL DAILY
Qty: 30 CAPSULE | Refills: 3 | Status: CANCELLED | OUTPATIENT
Start: 2021-01-07

## 2021-01-07 RX ORDER — FAMOTIDINE 20 MG/1
20 TABLET, FILM COATED ORAL NIGHTLY
Qty: 60 TABLET | Refills: 3 | Status: SHIPPED | OUTPATIENT
Start: 2021-01-07

## 2021-01-07 NOTE — TELEPHONE ENCOUNTER
Pt is due for labs in approx 2 weeks - please make sure she comes in to have these done (fasting), which will include her A1c and microalbumin. Will refill for 90-day supply only, as we may need to increase dose, depending on A1c.

## 2021-01-07 NOTE — PROGRESS NOTES
Pt is here for follow up of her GERD and abd cramps. She said that her GERD is better on the Nexium in the a.m. and Pepcid in the p.m. She still has some abdominal cramps every now and then especially with dairy. But she is try the Lactaid pills and avoided dairy and she is doing pretty well. At this point I think we will stay on the medications for another 3 months of the Nexium and Pepcid for the GERD. At that point if she is fairly asymptomatic I would stop the Nexium in the a.m. and continue Pepcid at night. If the symptoms come back then I would resume both meds again. As far as her abdominal cramps that are better by staying away from dairy products or lactose. Also we will treat her with Bentyl once a day for a month to see if that helps sometimes. Finally we will follow up on as needed basis. If she is having any issues to be glad to see her for these concerns.

## 2021-01-07 NOTE — PATIENT INSTRUCTIONS
Take Nexium in the morning and Pepcid in the evening for 3 months. After 3 months you can stop taking one of the medications. Take Bentyl daily for 1 month and as needed. Avoid dairy products.

## 2021-01-16 ENCOUNTER — HOSPITAL ENCOUNTER (OUTPATIENT)
Dept: LAB | Age: 41
Discharge: HOME | End: 2021-01-16
Payer: COMMERCIAL

## 2021-01-16 DIAGNOSIS — E78.5 HYPERLIPIDEMIA, UNSPECIFIED HYPERLIPIDEMIA TYPE: ICD-10-CM

## 2021-01-16 DIAGNOSIS — E11.9 TYPE 2 DIABETES MELLITUS WITHOUT COMPLICATION, WITHOUT LONG-TERM CURRENT USE OF INSULIN (HCC): ICD-10-CM

## 2021-01-16 LAB
ALBUMIN SERPL-MCNC: 4.4 G/DL (ref 3.5–5.2)
ALBUMIN/GLOBULIN RATIO: 1.6 (ref 1–2.5)
ALP BLD-CCNC: 41 U/L (ref 35–104)
ALT SERPL-CCNC: 28 U/L (ref 5–33)
ANION GAP SERPL CALCULATED.3IONS-SCNC: 10 MMOL/L (ref 9–17)
AST SERPL-CCNC: 21 U/L
BILIRUB SERPL-MCNC: 0.38 MG/DL (ref 0.3–1.2)
BUN BLDV-MCNC: 11 MG/DL (ref 6–20)
BUN/CREAT BLD: 20 (ref 9–20)
CALCIUM SERPL-MCNC: 9 MG/DL (ref 8.6–10.4)
CHLORIDE BLD-SCNC: 102 MMOL/L (ref 98–107)
CHOLESTEROL/HDL RATIO: 6.2
CHOLESTEROL: 203 MG/DL
CO2: 23 MMOL/L (ref 20–31)
CREAT SERPL-MCNC: 0.55 MG/DL (ref 0.5–0.9)
CREATININE URINE: 210.1 MG/DL (ref 28–217)
GFR AFRICAN AMERICAN: >60 ML/MIN
GFR NON-AFRICAN AMERICAN: >60 ML/MIN
GFR SERPL CREATININE-BSD FRML MDRD: ABNORMAL ML/MIN/{1.73_M2}
GFR SERPL CREATININE-BSD FRML MDRD: ABNORMAL ML/MIN/{1.73_M2}
GLUCOSE BLD-MCNC: 185 MG/DL (ref 70–99)
HDLC SERPL-MCNC: 33 MG/DL
LDL CHOLESTEROL: 148 MG/DL (ref 0–130)
MICROALBUMIN/CREAT 24H UR: 66 MG/L
MICROALBUMIN/CREAT UR-RTO: 31 MCG/MG CREAT
POTASSIUM SERPL-SCNC: 4 MMOL/L (ref 3.7–5.3)
SODIUM BLD-SCNC: 135 MMOL/L (ref 135–144)
TOTAL PROTEIN: 7.1 G/DL (ref 6.4–8.3)
TRIGL SERPL-MCNC: 112 MG/DL
VLDLC SERPL CALC-MCNC: ABNORMAL MG/DL (ref 1–30)

## 2021-01-16 PROCEDURE — 82043 UR ALBUMIN QUANTITATIVE: CPT

## 2021-01-16 PROCEDURE — 83036 HEMOGLOBIN GLYCOSYLATED A1C: CPT

## 2021-01-16 PROCEDURE — 80061 LIPID PANEL: CPT

## 2021-01-16 PROCEDURE — 82570 ASSAY OF URINE CREATININE: CPT

## 2021-01-16 PROCEDURE — 80053 COMPREHEN METABOLIC PANEL: CPT

## 2021-01-16 PROCEDURE — 36415 COLL VENOUS BLD VENIPUNCTURE: CPT

## 2021-01-17 LAB
ESTIMATED AVERAGE GLUCOSE: 186 MG/DL
HBA1C MFR BLD: 8.1 % (ref 4–6)

## 2021-01-25 ENCOUNTER — OFFICE VISIT (OUTPATIENT)
Dept: FAMILY MEDICINE CLINIC | Age: 41
End: 2021-01-25
Payer: COMMERCIAL

## 2021-01-25 VITALS
BODY MASS INDEX: 24.63 KG/M2 | SYSTOLIC BLOOD PRESSURE: 140 MMHG | HEIGHT: 63 IN | OXYGEN SATURATION: 98 % | WEIGHT: 139 LBS | HEART RATE: 88 BPM | TEMPERATURE: 99.1 F | DIASTOLIC BLOOD PRESSURE: 90 MMHG

## 2021-01-25 DIAGNOSIS — E11.9 TYPE 2 DIABETES MELLITUS WITHOUT COMPLICATION, WITHOUT LONG-TERM CURRENT USE OF INSULIN (HCC): Primary | ICD-10-CM

## 2021-01-25 DIAGNOSIS — J30.1 CHRONIC SEASONAL ALLERGIC RHINITIS DUE TO POLLEN: ICD-10-CM

## 2021-01-25 DIAGNOSIS — E78.5 HYPERLIPIDEMIA, UNSPECIFIED HYPERLIPIDEMIA TYPE: ICD-10-CM

## 2021-01-25 PROCEDURE — 3052F HG A1C>EQUAL 8.0%<EQUAL 9.0%: CPT | Performed by: FAMILY MEDICINE

## 2021-01-25 PROCEDURE — 99214 OFFICE O/P EST MOD 30 MIN: CPT | Performed by: FAMILY MEDICINE

## 2021-01-25 RX ORDER — NORGESTREL AND ETHINYL ESTRADIOL 0.3-0.03MG
1 KIT ORAL DAILY
COMMUNITY
Start: 2021-01-10 | End: 2021-10-06

## 2021-01-25 ASSESSMENT — ENCOUNTER SYMPTOMS: BLOOD IN STOOL: 0

## 2021-01-25 NOTE — PROGRESS NOTES
touch. Taking OCP's from Dr. Charmaine Luther (Gyn in [facility]) - sees him yearly. UTD on her Pap smears per pt. Has an order to do mammogram - pt will schedule.           Past Medical History:   Diagnosis Date    Allergic rhinitis     GERD (gastroesophageal reflux disease)     Hemorrhoids     Hyperlipidemia     Hypertension     treated in past but lost weight currently on no medication    Hypoglycemia     Kidney stones     Tobacco abuse     UTI (urinary tract infection)     frequency      Past Surgical History:   Procedure Laterality Date    CARPAL TUNNEL RELEASE Left     ENDOMETRIAL ABLATION      LITHOTRIPSY      NASAL SEPTUM SURGERY      OTHER SURGICAL HISTORY Right 09/07/2016    laser ureteroscopic lithotripsy    [place] EGD TRANSORAL BIOPSY SINGLE/MULTIPLE N/A 3/13/2017    EGD BIOPSY performed by Kandi Segundo MD at [address] [place] [address] Bilateral     approximately 2013   [facility] UPPER GASTROINTESTINAL ENDOSCOPY  2010    hiatal hernia     UPPER GASTROINTESTINAL ENDOSCOPY  03/13/2017    mild gastritis, Dr. Abi Kirkland N/A 3/28/2019    EGD BIOPSY W/48 HR GASCA performed by Kandi Segundo MD at [facility] OR.mild inflammation and gastric polyps     Family History   Problem Relation Age of Onset    High Blood Pressure Mother     Stroke Mother     Thyroid Disease Mother     Diabetes Father     High Blood Pressure Father     Cancer Father         Bladder     Social History     Tobacco Use    Smoking status: Current Every Day Smoker     Packs/day: 0.50     Years: 10.00     Pack years: 5.00     Start date: 2012    Smokeless tobacco: Never Used    Tobacco comment: vaping   Substance Use Topics    Alcohol use: Not Currently     Comment: social      Current Outpatient Medications   Medication Sig Dispense Refill    ELINEST 0.3-30 MG-MCG per tablet Take 1 tablet by mouth daily      dapagliflozin (FARXIGA) 5 MG tablet Take 1 tablet by mouth every morning 30 tablet 0    dicyclomine (BENTYL) 10 MG capsule Take 1 capsule by mouth 4 times daily as needed (for lower GI symptoms) 120 capsule 3    esomeprazole (NEXIUM) 40 MG delayed release capsule Take 1 capsule by mouth daily 180 capsule 3    Dulaglutide (TRULICITY) 1.5 CE/0.5IT SOPN Inject 1.5 mg into the skin once a week 12 pen 0    loratadine (CLARITIN) 10 MG tablet Take two tablets once daily 180 tablet 3    NONFORMULARY Take 1 capsule by mouth THRIVE capsule, nutritional shake and patch      montelukast (SINGULAIR) 10 MG tablet montelukast 10 mg tablet      blood glucose monitor kit and supplies Test one time a day & as needed for symptoms of irregular blood glucose. 1 kit 0    blood glucose monitor strips Test 1 time a day & as needed for symptoms of irregular blood glucose. 100 strip 3    Lancets MISC Use to check glucose once daily and as needed for irregular blood glucose. 100 each 3    fluticasone (FLONASE) 50 MCG/ACT nasal spray 2 sprays to each nostril once daily. 3 Bottle 3    albuterol sulfate  (90 Base) MCG/ACT inhaler Inhale 2 puffs into the lungs 4 times daily as needed for Wheezing 1 Inhaler 0    famotidine (PEPCID) 20 MG tablet Take 1 tablet by mouth nightly 60 tablet 3     No current facility-administered medications for this visit.       Allergies   Allergen Reactions    Latex Dermatitis    Amoxil [Amoxicillin] Diarrhea    Bactrim [Sulfamethoxazole-Trimethoprim] Itching    Clindamycin/Lincomycin      Nausea,vomiting    Flagyl [Metronidazole] Other (See Comments)     Sees bugs crawling    Biaxin [Clarithromycin] Nausea And Vomiting    Ceftin [Cefuroxime Axetil] Diarrhea and Nausea And Vomiting    Keflex [Cephalexin] Nausea And Vomiting       Health Maintenance   Topic Date Due    Pneumococcal 0-64 years Vaccine (1 of 1 - PPSV23) 02/06/1985    Diabetic foot exam  07/29/2020    Flu vaccine (1) 09/01/2020    Diabetic retinal exam  01/06/2021    Hepatitis B vaccine (1 of 3 - Risk 3-dose series) 01/25/2022 (Originally 2/6/1998)    DTaP/Tdap/Td vaccine (1 - Tdap) 01/25/2022 (Originally 2/6/1998)    Cervical cancer screen  05/01/2021    A1C test (Diabetic or Prediabetic)  01/16/2022    Diabetic microalbuminuria test  01/16/2022    Lipid screen  01/16/2022    Hepatitis C screen  Completed    HIV screen  Completed    Hepatitis A vaccine  Aged Out    Hib vaccine  Aged Out    Meningococcal (ACWY) vaccine  Aged Out       Subjective:      Review of Systems   Constitutional: Negative for fever. Unexpected weight change: Has lost 10 lbs since OV one year ago. Cardiovascular: Negative for chest pain and palpitations. Gastrointestinal: Negative for blood in stool. Genitourinary: Negative for dysuria, hematuria and vaginal discharge. Skin: Negative for rash and wound. Psychiatric/Behavioral: Negative for dysphoric mood and suicidal ideas. Objective:     Vitals:    01/25/21 0817 01/25/21 0910   BP: (!) 134/90 (!) 140/90   Site: Right Upper Arm Right Upper Arm   Position: Sitting Sitting   Cuff Size: Medium Adult Medium Adult   Pulse: 88    Temp: 99.1 °F (37.3 °C)    TempSrc: Tympanic    SpO2: 98%    Weight: 139 lb (63 kg)    Height: 5' 3\" (1.6 m)      Physical Exam  Vitals signs and nursing note reviewed. Constitutional:       General: She is not in acute distress. Appearance: She is well-developed. HENT:      Head: Normocephalic and atraumatic. Right Ear: Tympanic membrane, ear canal and external ear normal.      Left Ear: Tympanic membrane, ear canal and external ear normal.      Nose: Nose normal.      Mouth/Throat:      Mouth: Mucous membranes are moist.      Pharynx: Oropharynx is clear. No posterior oropharyngeal erythema. Eyes:      Conjunctiva/sclera: Conjunctivae normal.   Neck:      Musculoskeletal: Neck supple. Cardiovascular:      Rate and Rhythm: Normal rate and regular rhythm. Heart sounds: Normal heart sounds.    Pulmonary:      Effort: maintenance.             Electronically signed by Sheryl Carpenter DO on 2/14/2021 at 9:54 AM

## 2021-02-03 ENCOUNTER — PATIENT MESSAGE (OUTPATIENT)
Dept: FAMILY MEDICINE CLINIC | Age: 41
End: 2021-02-03

## 2021-02-10 ENCOUNTER — TELEPHONE (OUTPATIENT)
Dept: SURGERY | Age: 41
End: 2021-02-10

## 2021-02-10 NOTE — TELEPHONE ENCOUNTER
Patient called in stating she had requesting refill of famotidine (Pepcid) be sent to her [facility]. Patient is just now checking on the status of this Rx and she was told that there was a Prior Auth sent to Dr Emerson Simon.   Please call patient back if this has been done [phone]

## 2021-02-10 NOTE — TELEPHONE ENCOUNTER
Patient called our office stating that she checked on her refill of famotidine that was placed through [facility] on 1/4/21. She states that it needs a prior [place]. I have checked her chart and have not found a notice of prior auth request. I placed a call to [employer]. They will fax a prior auth request to our office.

## 2021-02-11 NOTE — TELEPHONE ENCOUNTER
We never received a prior auth request. I called [employer]. They faxed me a letter of denial for famotidine. It is not a covered benefit. Letter of appeal done.

## 2021-02-24 DIAGNOSIS — E11.9 TYPE 2 DIABETES MELLITUS WITHOUT COMPLICATION, WITHOUT LONG-TERM CURRENT USE OF INSULIN (HCC): ICD-10-CM

## 2021-02-24 RX ORDER — DAPAGLIFLOZIN 5 MG/1
TABLET, FILM COATED ORAL
Qty: 30 TABLET | Refills: 0 | OUTPATIENT
Start: 2021-02-24

## 2021-03-12 ENCOUNTER — PATIENT MESSAGE (OUTPATIENT)
Dept: FAMILY MEDICINE CLINIC | Age: 41
End: 2021-03-12

## 2021-04-10 DIAGNOSIS — E11.9 TYPE 2 DIABETES MELLITUS WITHOUT COMPLICATION, WITHOUT LONG-TERM CURRENT USE OF INSULIN (HCC): ICD-10-CM

## 2021-04-13 RX ORDER — DULAGLUTIDE 1.5 MG/.5ML
1.5 INJECTION, SOLUTION SUBCUTANEOUS WEEKLY
Qty: 12 PEN | Refills: 3 | Status: SHIPPED | OUTPATIENT
Start: 2021-04-13 | End: 2021-06-24 | Stop reason: DRUGHIGH

## 2021-04-24 ENCOUNTER — HOSPITAL ENCOUNTER (OUTPATIENT)
Dept: LAB | Age: 41
Discharge: HOME | End: 2021-04-24
Payer: COMMERCIAL

## 2021-04-24 DIAGNOSIS — E11.9 TYPE 2 DIABETES MELLITUS WITHOUT COMPLICATION, WITHOUT LONG-TERM CURRENT USE OF INSULIN (HCC): ICD-10-CM

## 2021-04-24 PROCEDURE — 83036 HEMOGLOBIN GLYCOSYLATED A1C: CPT

## 2021-04-24 PROCEDURE — 36415 COLL VENOUS BLD VENIPUNCTURE: CPT

## 2021-04-25 LAB
ESTIMATED AVERAGE GLUCOSE: 134 MG/DL
HBA1C MFR BLD: 6.3 % (ref 4–6)

## 2021-04-26 DIAGNOSIS — E11.9 TYPE 2 DIABETES MELLITUS WITHOUT COMPLICATION, WITHOUT LONG-TERM CURRENT USE OF INSULIN (HCC): Primary | ICD-10-CM

## 2021-06-10 ENCOUNTER — HOSPITAL ENCOUNTER (OUTPATIENT)
Dept: LAB | Age: 41
Discharge: HOME | End: 2021-06-10
Payer: COMMERCIAL

## 2021-06-10 DIAGNOSIS — R10.9 ABDOMINAL PAIN, UNSPECIFIED ABDOMINAL LOCATION: ICD-10-CM

## 2021-06-10 LAB
-: ABNORMAL
AMORPHOUS: ABNORMAL
BACTERIA: ABNORMAL
BILIRUBIN URINE: NEGATIVE
CASTS UA: ABNORMAL /LPF (ref 0–2)
COLOR: NORMAL
COMMENT UA: NORMAL
CRYSTALS, UA: ABNORMAL /HPF
EPITHELIAL CELLS UA: ABNORMAL /HPF (ref 0–5)
GLUCOSE URINE: NEGATIVE
KETONES, URINE: NEGATIVE
LEUKOCYTE ESTERASE, URINE: NEGATIVE
MUCUS: ABNORMAL
NITRITE, URINE: NEGATIVE
OTHER OBSERVATIONS UA: ABNORMAL
PH UA: 6 (ref 5–6)
PROTEIN UA: NEGATIVE
RBC UA: ABNORMAL /HPF (ref 0–4)
RENAL EPITHELIAL, UA: ABNORMAL /HPF
SPECIFIC GRAVITY UA: 1.02 (ref 1.01–1.02)
TRICHOMONAS: ABNORMAL
TURBIDITY: NORMAL
URINE HGB: NEGATIVE
UROBILINOGEN, URINE: NORMAL
WBC UA: ABNORMAL /HPF (ref 0–4)
YEAST: ABNORMAL

## 2021-06-10 PROCEDURE — 81001 URINALYSIS AUTO W/SCOPE: CPT

## 2021-06-11 ENCOUNTER — TELEPHONE (OUTPATIENT)
Dept: FAMILY MEDICINE CLINIC | Age: 41
End: 2021-06-11

## 2021-06-11 DIAGNOSIS — R10.2 PELVIC CRAMPING: Primary | ICD-10-CM

## 2021-06-11 DIAGNOSIS — R39.89 BLADDER PAIN: ICD-10-CM

## 2021-06-11 RX ORDER — PHENAZOPYRIDINE HYDROCHLORIDE 100 MG/1
100 TABLET, FILM COATED ORAL 3 TIMES DAILY PRN
Qty: 20 TABLET | Refills: 0 | Status: SHIPPED | OUTPATIENT
Start: 2021-06-11 | End: 2021-06-14

## 2021-06-11 NOTE — TELEPHONE ENCOUNTER
Patient would like a call back, questions with labs and would like results before Dr. Wyatt Gathers.

## 2021-06-11 NOTE — TELEPHONE ENCOUNTER
Went over U/A results with Emily. She has some pelvic cramping. No fever, no dysuria. Does not have regular menses anymore. Lab no longer has her specimen so I signed for a UC only per pt request.  She'll come tomorrow am.  Concerned because she is leaving for vacation middle of next week. Requesting Rx for Pyridium to [facility] [place] [place]. Won't start till she gets specimen.

## 2021-06-12 ENCOUNTER — HOSPITAL ENCOUNTER (OUTPATIENT)
Dept: LAB | Age: 41
Discharge: HOME | End: 2021-06-12
Payer: COMMERCIAL

## 2021-06-12 DIAGNOSIS — R10.2 PELVIC CRAMPING: ICD-10-CM

## 2021-06-12 PROCEDURE — 87086 URINE CULTURE/COLONY COUNT: CPT

## 2021-06-13 LAB
CULTURE: NO GROWTH
Lab: NORMAL
SPECIMEN DESCRIPTION: NORMAL

## 2021-06-14 ENCOUNTER — HOSPITAL ENCOUNTER (OUTPATIENT)
Age: 41
Setting detail: SPECIMEN
Discharge: HOME | End: 2021-06-14
Payer: COMMERCIAL

## 2021-06-14 ENCOUNTER — HOSPITAL ENCOUNTER (OUTPATIENT)
Dept: GENERAL RADIOLOGY | Age: 41
Discharge: HOME | End: 2021-06-16
Payer: COMMERCIAL

## 2021-06-14 ENCOUNTER — OFFICE VISIT (OUTPATIENT)
Dept: PRIMARY CARE CLINIC | Age: 41
End: 2021-06-14
Payer: COMMERCIAL

## 2021-06-14 VITALS
BODY MASS INDEX: 21.26 KG/M2 | TEMPERATURE: 99.2 F | WEIGHT: 120 LBS | HEART RATE: 88 BPM | DIASTOLIC BLOOD PRESSURE: 88 MMHG | SYSTOLIC BLOOD PRESSURE: 130 MMHG | OXYGEN SATURATION: 98 %

## 2021-06-14 DIAGNOSIS — R10.9 FLANK PAIN: ICD-10-CM

## 2021-06-14 DIAGNOSIS — R10.9 FLANK PAIN: Primary | ICD-10-CM

## 2021-06-14 PROCEDURE — 81001 URINALYSIS AUTO W/SCOPE: CPT

## 2021-06-14 PROCEDURE — 74018 RADEX ABDOMEN 1 VIEW: CPT

## 2021-06-14 PROCEDURE — 87086 URINE CULTURE/COLONY COUNT: CPT

## 2021-06-14 PROCEDURE — 99214 OFFICE O/P EST MOD 30 MIN: CPT | Performed by: FAMILY MEDICINE

## 2021-06-14 RX ORDER — PHENAZOPYRIDINE HYDROCHLORIDE 200 MG/1
200 TABLET, FILM COATED ORAL 3 TIMES DAILY PRN
Qty: 6 TABLET | Refills: 0 | Status: SHIPPED | OUTPATIENT
Start: 2021-06-14 | End: 2021-11-16 | Stop reason: SDUPTHER

## 2021-06-14 SDOH — ECONOMIC STABILITY: FOOD INSECURITY: WITHIN THE PAST 12 MONTHS, THE FOOD YOU BOUGHT JUST DIDN'T LAST AND YOU DIDN'T HAVE MONEY TO GET MORE.: NEVER TRUE

## 2021-06-14 SDOH — ECONOMIC STABILITY: FOOD INSECURITY: WITHIN THE PAST 12 MONTHS, YOU WORRIED THAT YOUR FOOD WOULD RUN OUT BEFORE YOU GOT MONEY TO BUY MORE.: NEVER TRUE

## 2021-06-14 ASSESSMENT — PATIENT HEALTH QUESTIONNAIRE - PHQ9
SUM OF ALL RESPONSES TO PHQ QUESTIONS 1-9: 0
2. FEELING DOWN, DEPRESSED OR HOPELESS: 0
SUM OF ALL RESPONSES TO PHQ9 QUESTIONS 1 & 2: 0
SUM OF ALL RESPONSES TO PHQ QUESTIONS 1-9: 0
1. LITTLE INTEREST OR PLEASURE IN DOING THINGS: 0
SUM OF ALL RESPONSES TO PHQ QUESTIONS 1-9: 0

## 2021-06-14 ASSESSMENT — SOCIAL DETERMINANTS OF HEALTH (SDOH): HOW HARD IS IT FOR YOU TO PAY FOR THE VERY BASICS LIKE FOOD, HOUSING, MEDICAL CARE, AND HEATING?: NOT HARD AT ALL

## 2021-06-14 NOTE — PROGRESS NOTES
2021     Emily Bledsoe (:  1980) is a 42 y.o. female, here for evaluation of the following medical concerns:    Flank Pain  This is a new problem. The current episode started 1 to 4 weeks ago. The problem occurs constantly. The problem has been waxing and waning since onset. The pain is present in the thoracic spine and lumbar spine. The quality of the pain is described as cramping and stabbing. The pain is moderate. Associated symptoms include abdominal pain and dysuria. Pertinent negatives include no fever, leg pain or tingling. (Left flank/lower back keeps spasming. Bladder feels crampy and has been doing this for a week and a half) She has tried NSAIDs (tylenol) for the symptoms. The treatment provided no relief. Did review patient's med list, allergies, social history,pmhx and pshx today as noted in the record. Review of Systems   Constitutional: Negative for chills, fatigue and fever. HENT: Negative. Gastrointestinal: Positive for abdominal pain. Negative for constipation, diarrhea, nausea and vomiting. Genitourinary: Positive for dysuria and flank pain. Neurological: Negative for tingling. Prior to Visit Medications    Medication Sig Taking?  Authorizing Provider   Dulaglutide (TRULICITY) 1.5 RP/2.4SS SOPN Inject 1.5 mg as directed once a week Yes Gaston Eason,    ELINEST 0.3-30 MG-MCG per tablet Take 1 tablet by mouth daily Yes Historical Provider, MD   famotidine (PEPCID) 20 MG tablet Take 1 tablet by mouth nightly Yes Lynette Stern MD   dicyclomine (BENTYL) 10 MG capsule Take 1 capsule by mouth 4 times daily as needed (for lower GI symptoms) Yes Lynette Stern MD   esomeprazole (NEXIUM) 40 MG delayed release capsule Take 1 capsule by mouth daily Yes Lynette Stern MD   loratadine (CLARITIN) 10 MG tablet Take two tablets once daily Yes DEAN Coe CNP   NONFORMULARY Take 1 capsule by mouth THRIVE capsule, nutritional shake and patch Yes Historical Provider, MD   montelukast (SINGULAIR) 10 MG tablet montelukast 10 mg tablet Yes Historical Provider, MD   blood glucose monitor kit and supplies Test one time a day & as needed for symptoms of irregular blood glucose. Yes Rhina Blush Black, DO   blood glucose monitor strips Test 1 time a day & as needed for symptoms of irregular blood glucose. Yes Rhina Muller Black, DO   Lancets MISC Use to check glucose once daily and as needed for irregular blood glucose. Yes Rhina Muller Black, DO   fluticasone (FLONASE) 50 MCG/ACT nasal spray 2 sprays to each nostril once daily. Yes DEAN Nickerson CNP   albuterol sulfate  (90 Base) MCG/ACT inhaler Inhale 2 puffs into the lungs 4 times daily as needed for Wheezing Yes DEAN Boyd CNP   phenazopyridine (PYRIDIUM) 100 MG tablet Take 1 tablet by mouth 3 times daily as needed for Pain  Patient not taking: Reported on 6/14/2021  Ursula Fields DO        Social History     Tobacco Use    Smoking status: Current Every Day Smoker     Packs/day: 0.50     Years: 10.00     Pack years: 5.00     Start date: 2012    Smokeless tobacco: Never Used    Tobacco comment: vaping   Substance Use Topics    Alcohol use: Not Currently     Comment: social        Vitals:    06/14/21 1337   BP: 130/88   Site: Left Upper Arm   Position: Sitting   Cuff Size: Medium Adult   Pulse: 88   Temp: 99.2 °F (37.3 °C)   TempSrc: Tympanic   SpO2: 98%   Weight: 120 lb (54.4 kg)     Estimated body mass index is 21.26 kg/m² as calculated from the following:    Height as of 1/25/21: 5' 3\" (1.6 m). Weight as of this encounter: 120 lb (54.4 kg). Physical Exam  Vitals and nursing note reviewed. Constitutional:       General: She is not in acute distress. Appearance: Normal appearance. She is well-developed. She is not diaphoretic. HENT:      Head: Normocephalic and atraumatic. Nose: Nose normal.   Eyes:      General:         Right eye: No discharge. Left eye: No discharge. Conjunctiva/sclera: Conjunctivae normal.   Cardiovascular:      Rate and Rhythm: Normal rate and regular rhythm. Pulmonary:      Effort: Pulmonary effort is normal. No respiratory distress. Breath sounds: Normal breath sounds. No wheezing. Abdominal:      General: Bowel sounds are normal. There is no distension. Palpations: Abdomen is soft. There is no mass. Tenderness: There is abdominal tenderness (suprapubic). There is left CVA tenderness. There is no guarding or rebound. Musculoskeletal:      Cervical back: Normal range of motion and neck supple. No rigidity. Lymphadenopathy:      Cervical: No cervical adenopathy. Skin:     General: Skin is warm and dry. Neurological:      Mental Status: She is alert and oriented to person, place, and time. Psychiatric:         Behavior: Behavior normal.         Thought Content: Thought content normal.         Judgment: Judgment normal.         ASSESSMENT/PLAN:  Encounter Diagnosis   Name Primary?  Flank pain Yes     Orders Placed This Encounter   Medications    phenazopyridine (PYRIDIUM) 200 MG tablet     Sig: Take 1 tablet by mouth 3 times daily as needed for Pain     Dispense:  6 tablet     Refill:  0     Orders Placed This Encounter   Procedures    Culture, Urine     Standing Status:   Future     Number of Occurrences:   1     Standing Expiration Date:   6/14/2022     Order Specific Question:   Specify (ex-cath, midstream, cysto, etc)? Answer:   midstream    XR ABDOMEN (KUB) (SINGLE AP VIEW)     Standing Status:   Future     Number of Occurrences:   1     Standing Expiration Date:   6/14/2022     Order Specific Question:   Reason for exam:     Answer:   left flank pain and left sided abdominal pain. History of kidney stones    Urinalysis Reflex to Culture     Standing Status:   Future     Number of Occurrences:   1     Standing Expiration Date:   6/14/2022     Order Specific Question:   SPECIFY(EX-CATH,MIDSTREAM,CYSTO,ETC)? Answer:   midstream     I did review her urinalysis. This does not show definitive evidence of infection. XR ABDOMEN (KUB) (SINGLE AP VIEW)  Narrative: EXAMINATION:  ONE SUPINE XRAY VIEW(S) OF THE ABDOMEN    6/14/2021 2:22 pm    COMPARISON:  CT abdomen and pelvis performed 06/15/2018. HISTORY:  ORDERING SYSTEM PROVIDED HISTORY: Flank pain  TECHNOLOGIST PROVIDED HISTORY:  left flank pain and left sided abdominal pain. History of kidney stones  Reason for Exam: H/O kidney stones, left flank pain and left sided abdominal  pain x 1-2 weeks    FINDINGS:  There is a nonobstructive bowel gas pattern. There is no intraperitoneal  free air. There may be punctate left renal stones. There is a mild amount  of formed stool throughout the abdomen. There is no acute osseous  abnormality. The SI joints and hip joints are maintained. The surrounding  soft tissues are unremarkable. Impression: Nonobstructive bowel gas pattern. Mild amount of formed stool throughout the abdomen. Potential punctate left renal stones. Possible left renal stones, but no other abnormality. Patient is upset that there isn't a definitive infection or other abnormality to explain her pain. She states that the bladder spasms are quite intense and severe. I offered to pursue CT of abdomen and pelvis for more definitive evaluation of any possible intra-abdominal etiology contributing to her symptoms and she declined. Did discuss urology evaluation for further opinion as it sounds as if she has had intermittent recurrent urinary issues. Had seen Dr. Ethan Anderson in the past and did not care for him. Did discuss with patient that we do have new urologists here at the clinic that she can see and she declined. Will treat with pyridium to help with any inflammation/irritation in the bladder and have her hydrate well. Will culture urine and check with patient once culture report is available to see if she is any better.   If persistent or

## 2021-06-15 LAB
CULTURE: NORMAL
Lab: NORMAL
SPECIMEN DESCRIPTION: NORMAL

## 2021-06-15 ASSESSMENT — ENCOUNTER SYMPTOMS
VOMITING: 0
CONSTIPATION: 0
ABDOMINAL PAIN: 1
DIARRHEA: 0
NAUSEA: 0

## 2021-06-21 DIAGNOSIS — E11.9 TYPE 2 DIABETES MELLITUS WITHOUT COMPLICATION, WITHOUT LONG-TERM CURRENT USE OF INSULIN (HCC): ICD-10-CM

## 2021-06-21 NOTE — TELEPHONE ENCOUNTER
Patient is calling and stating that she is on the highest dose of Trulicity but she is doing Keto for 4 months and she is wondering if maybe she be can be put on a lower dose of Trulicity.  Please call and advise

## 2021-06-22 NOTE — TELEPHONE ENCOUNTER
Call to Emily, states she checks BG periodically through day and readings are 90-110s. Feeling nauseated and not well and does not even want to continue taking but would rather taper down dose instead of stopping cold turkey.

## 2021-06-24 RX ORDER — DULAGLUTIDE 1.5 MG/.5ML
1.5 INJECTION, SOLUTION SUBCUTANEOUS WEEKLY
Qty: 12 PEN | Refills: 3 | Status: CANCELLED | OUTPATIENT
Start: 2021-06-24

## 2021-06-24 RX ORDER — DULAGLUTIDE 0.75 MG/.5ML
0.75 INJECTION, SOLUTION SUBCUTANEOUS WEEKLY
Qty: 4 PEN | Refills: 5 | Status: SHIPPED | OUTPATIENT
Start: 2021-06-24 | End: 2021-07-27 | Stop reason: SDUPTHER

## 2021-06-24 NOTE — TELEPHONE ENCOUNTER
New Rx for Trulicity 1.00 mg dose sent to pharmacy - would like pt to lower to this dose and stay there for at least 3 weeks to see if sx's improve. Will be due for re-check of A1c in 1 month.

## 2021-06-24 NOTE — TELEPHONE ENCOUNTER
Emily called requesting a refill of the below medication which has been pended for you:     Requested Prescriptions     Pending Prescriptions Disp Refills    Dulaglutide (TRULICITY) 1.5 RU/5.1JF SOPN 12 pen 3     Sig: Inject 1.5 mg as directed once a week       Last Appointment Date: 1/25/2021  Next Appointment Date: 7/27/2021    Allergies   Allergen Reactions    Latex Dermatitis    Amoxil [Amoxicillin] Diarrhea    Bactrim [Sulfamethoxazole-Trimethoprim] Itching    Clindamycin/Lincomycin      Nausea,vomiting    Flagyl [Metronidazole] Other (See Comments)     Sees bugs crawling    Biaxin [Clarithromycin] Nausea And Vomiting    Ceftin [Cefuroxime Axetil] Diarrhea and Nausea And Vomiting    Keflex [Cephalexin] Nausea And Vomiting

## 2021-07-24 ENCOUNTER — HOSPITAL ENCOUNTER (OUTPATIENT)
Dept: LAB | Age: 41
Discharge: HOME | End: 2021-07-24
Payer: COMMERCIAL

## 2021-07-24 DIAGNOSIS — E11.9 TYPE 2 DIABETES MELLITUS WITHOUT COMPLICATION, WITHOUT LONG-TERM CURRENT USE OF INSULIN (HCC): ICD-10-CM

## 2021-07-24 PROCEDURE — 83036 HEMOGLOBIN GLYCOSYLATED A1C: CPT

## 2021-07-24 PROCEDURE — 36415 COLL VENOUS BLD VENIPUNCTURE: CPT

## 2021-07-25 LAB
ESTIMATED AVERAGE GLUCOSE: 140 MG/DL
HBA1C MFR BLD: 6.5 % (ref 4–6)

## 2021-07-27 ENCOUNTER — OFFICE VISIT (OUTPATIENT)
Dept: FAMILY MEDICINE CLINIC | Age: 41
End: 2021-07-27
Payer: COMMERCIAL

## 2021-07-27 VITALS
BODY MASS INDEX: 21.79 KG/M2 | TEMPERATURE: 98.9 F | OXYGEN SATURATION: 98 % | SYSTOLIC BLOOD PRESSURE: 128 MMHG | DIASTOLIC BLOOD PRESSURE: 80 MMHG | WEIGHT: 123 LBS | RESPIRATION RATE: 16 BRPM | HEART RATE: 104 BPM | HEIGHT: 63 IN

## 2021-07-27 DIAGNOSIS — Z13.29 SCREENING FOR THYROID DISORDER: ICD-10-CM

## 2021-07-27 DIAGNOSIS — Z13.21 ENCOUNTER FOR VITAMIN DEFICIENCY SCREENING: ICD-10-CM

## 2021-07-27 DIAGNOSIS — E11.9 TYPE 2 DIABETES MELLITUS WITHOUT COMPLICATION, WITHOUT LONG-TERM CURRENT USE OF INSULIN (HCC): Primary | ICD-10-CM

## 2021-07-27 PROCEDURE — 99214 OFFICE O/P EST MOD 30 MIN: CPT | Performed by: FAMILY MEDICINE

## 2021-07-27 RX ORDER — GLUCOSAMINE HCL/CHONDROITIN SU 500-400 MG
CAPSULE ORAL
Qty: 200 STRIP | Refills: 3 | Status: SHIPPED | OUTPATIENT
Start: 2021-07-27 | End: 2022-03-04

## 2021-07-27 RX ORDER — DULAGLUTIDE 0.75 MG/.5ML
0.75 INJECTION, SOLUTION SUBCUTANEOUS WEEKLY
Qty: 12 PEN | Refills: 3 | Status: SHIPPED | OUTPATIENT
Start: 2021-07-27 | End: 2021-11-16 | Stop reason: ALTCHOICE

## 2021-07-27 RX ORDER — LANCETS 30 GAUGE
EACH MISCELLANEOUS
Qty: 200 EACH | Refills: 3 | Status: SHIPPED | OUTPATIENT
Start: 2021-07-27 | End: 2022-03-04

## 2021-07-27 ASSESSMENT — ENCOUNTER SYMPTOMS
COUGH: 0
BLOOD IN STOOL: 0

## 2021-07-27 NOTE — PROGRESS NOTES
0.50     Years: 10.00     Pack years: 5.00     Start date: 2012    Smokeless tobacco: Never Used    Tobacco comment: vaping   Substance Use Topics    Alcohol use: Not Currently     Comment: social      Current Outpatient Medications   Medication Sig Dispense Refill    blood glucose monitor strips Test 1 time a day & as needed for symptoms of irregular blood glucose. 200 strip 3    Lancets MISC Use to check glucose once daily and as needed for irregular blood glucose. 200 each 3    Dulaglutide (TRULICITY) 5.31 AE/9.5QV SOPN Inject 0.75 mg into the skin once a week 12 pen 3    famotidine (PEPCID) 20 MG tablet Take 1 tablet by mouth nightly 60 tablet 3    dicyclomine (BENTYL) 10 MG capsule Take 1 capsule by mouth 4 times daily as needed (for lower GI symptoms) 120 capsule 3    esomeprazole (NEXIUM) 40 MG delayed release capsule Take 1 capsule by mouth daily 180 capsule 3    loratadine (CLARITIN) 10 MG tablet Take two tablets once daily 180 tablet 3    NONFORMULARY Take 1 capsule by mouth THRIVE capsule, nutritional shake and patch      montelukast (SINGULAIR) 10 MG tablet montelukast 10 mg tablet      fluticasone (FLONASE) 50 MCG/ACT nasal spray 2 sprays to each nostril once daily. 3 Bottle 3    albuterol sulfate  (90 Base) MCG/ACT inhaler Inhale 2 puffs into the lungs 4 times daily as needed for Wheezing 1 Inhaler 0    ELINEST 0.3-30 MG-MCG per tablet Take 1 tablet by mouth daily       No current facility-administered medications for this visit.      Allergies   Allergen Reactions    Latex Dermatitis    Amoxil [Amoxicillin] Diarrhea    Bactrim [Sulfamethoxazole-Trimethoprim] Itching    Clindamycin/Lincomycin      Nausea,vomiting    Flagyl [Metronidazole] Other (See Comments)     Sees bugs crawling    Biaxin [Clarithromycin] Nausea And Vomiting    Ceftin [Cefuroxime Axetil] Diarrhea and Nausea And Vomiting    Keflex [Cephalexin] Nausea And Vomiting       Health Maintenance   Topic Date Due  Pneumococcal 0-64 years Vaccine (1 of 2 - PPSV23) Never done    COVID-19 Vaccine (1) Never done    Diabetic retinal exam  01/06/2021    Cervical cancer screen  05/01/2021    Hepatitis B vaccine (1 of 3 - Risk 3-dose series) 01/25/2022 (Originally 2/6/1998)    DTaP/Tdap/Td vaccine (1 - Tdap) 01/25/2022 (Originally 2/6/1998)    Flu vaccine (1) 09/01/2021    Diabetic microalbuminuria test  01/16/2022    Lipid screen  01/16/2022    A1C test (Diabetic or Prediabetic)  07/24/2022    Diabetic foot exam  07/27/2022    Hepatitis C screen  Completed    HIV screen  Completed    Hepatitis A vaccine  Aged Out    Hib vaccine  Aged Out    Meningococcal (ACWY) vaccine  Aged Out       Subjective:      Review of Systems   Constitutional: Negative for fever and unexpected weight change. Respiratory: Negative for cough. Cardiovascular: Negative for chest pain and palpitations. Gastrointestinal: Negative for blood in stool. Genitourinary: Negative for dysuria and hematuria. Skin: Negative for rash and wound. Neurological: Negative for dizziness, light-headedness and numbness. Psychiatric/Behavioral: Negative for dysphoric mood and suicidal ideas. Objective:     Vitals:    07/27/21 0845   BP: 128/80   Site: Right Upper Arm   Position: Sitting   Pulse: 104   Resp: 16   Temp: 98.9 °F (37.2 °C)   TempSrc: Tympanic   SpO2: 98%   Weight: 123 lb (55.8 kg)   Height: 5' 3\" (1.6 m)     Physical Exam  Vitals and nursing note reviewed. Constitutional:       General: She is not in acute distress. Appearance: She is well-developed. HENT:      Head: Normocephalic and atraumatic. Right Ear: Tympanic membrane, ear canal and external ear normal.      Left Ear: Tympanic membrane, ear canal and external ear normal.      Nose: Nose normal.      Mouth/Throat:      Mouth: Mucous membranes are moist.      Pharynx: Oropharynx is clear. No posterior oropharyngeal erythema.    Eyes:      Conjunctiva/sclera: Conjunctivae normal.   Cardiovascular:      Rate and Rhythm: Normal rate and regular rhythm. Heart sounds: Normal heart sounds. Pulmonary:      Effort: Pulmonary effort is normal. No respiratory distress. Breath sounds: Normal breath sounds. Abdominal:      General: Bowel sounds are normal. There is no distension. Palpations: Abdomen is soft. Tenderness: There is no abdominal tenderness. Musculoskeletal:      Cervical back: Neck supple. Skin:     General: Skin is warm and dry. Neurological:      Mental Status: She is alert and oriented to person, place, and time. Diabetic foot check: Normal sensation with the monofilament bilaterally. Dorsalis pedis pulses intact bilaterally. No skin breakdown, erythema, blisters, scaling, or ulcers. Toenails thin and not ingrown. No evidence of fungal infection. Assessment:       Diagnosis Orders   1. Type 2 diabetes mellitus without complication, without long-term current use of insulin ([facility])  blood glucose monitor strips    Lancets MISC    Dulaglutide (TRULICITY) 8.78 SL/0.4ME SOPN    Hemoglobin A1C    CBC Auto Differential     DIABETES FOOT EXAM   2. Encounter for vitamin deficiency screening  Vitamin D 25 Hydroxy   3. Screening for thyroid disorder  TSH With Reflex Ft4         Plan:      Declined Covid vaccine, Tdap booster, and Pneumovax. Return in about 6 months (around 1/27/2022) for well woman exam and f/u DM,labs - book for 40 mins! .    Orders Placed This Encounter   Procedures    Hemoglobin A1C     Standing Status:   Future     Standing Expiration Date:   7/27/2022    TSH With Reflex Ft4     Standing Status:   Future     Standing Expiration Date:   7/27/2022    Vitamin D 25 Hydroxy     Standing Status:   Future     Standing Expiration Date:   7/27/2022    CBC Auto Differential     Standing Status:   Future     Standing Expiration Date:   7/27/2022     DIABETES FOOT EXAM     Orders Placed This Encounter Medications    blood glucose monitor strips     Sig: Test 1 time a day & as needed for symptoms of irregular blood glucose. Dispense:  200 strip     Refill:  3     Please dispense Freestyle Lite strips per pt preference, if insurance covers.  Lancets MISC     Sig: Use to check glucose once daily and as needed for irregular blood glucose. Dispense:  200 each     Refill:  3    Dulaglutide (TRULICITY) 5.34 PF/8.2NI SOPN     Sig: Inject 0.75 mg into the skin once a week     Dispense:  12 pen     Refill:  3       Patient given educational materials - see patient instructions. Discussed use, benefit, and side effects of prescribed medications. All patient questions answered. Pt voiced understanding. Reviewed health maintenance.             Electronically signed by Jade Devine DO, DO on 8/8/2021 at 11:55 PM

## 2021-10-05 NOTE — TELEPHONE ENCOUNTER
Emily called requesting a refill of the below medication which has been pended for you:     Requested Prescriptions     Pending Prescriptions Disp Refills    ELINEST 0.3-30 MG-MCG per tablet [Pharmacy Med Name: Sadie DELATORRE] 28 tablet      Sig: take 1 tablet by mouth once daily SKIP PLACEBO WEEK       Last Appointment Date: 7/27/2021  Next Appointment Date: 2/8/2022    Allergies   Allergen Reactions    Latex Dermatitis    Amoxil [Amoxicillin] Diarrhea    Bactrim [Sulfamethoxazole-Trimethoprim] Itching    Clindamycin/Lincomycin      Nausea,vomiting    Flagyl [Metronidazole] Other (See Comments)     Sees bugs crawling    Biaxin [Clarithromycin] Nausea And Vomiting    Ceftin [Cefuroxime Axetil] Diarrhea and Nausea And Vomiting    Keflex [Cephalexin] Nausea And Vomiting

## 2021-10-06 RX ORDER — NORGESTREL AND ETHINYL ESTRADIOL 0.3-0.03MG
1 KIT ORAL DAILY
Qty: 84 TABLET | Refills: 0 | Status: SHIPPED | OUTPATIENT
Start: 2021-10-06 | End: 2021-10-15 | Stop reason: ALTCHOICE

## 2021-10-06 NOTE — TELEPHONE ENCOUNTER
Call to pt, Dr. Alexis Rollins is no longer in practice, she had requested all her records be sent here. Would like a 2-3 month supply of this med and may call in to set up appt if she decides she wants to switch meds. Notes from Dr. Keya Masterson office scanned into media from 4/27/21.

## 2021-10-06 NOTE — TELEPHONE ENCOUNTER
I do not prescribe this med - per dispense report, it appears Rx is under name of \"Willie Adame\". Please confirm.

## 2021-10-11 ENCOUNTER — PATIENT MESSAGE (OUTPATIENT)
Dept: FAMILY MEDICINE CLINIC | Age: 41
End: 2021-10-11

## 2021-10-11 NOTE — TELEPHONE ENCOUNTER
From: Linda Rg  To: Joann Mackey DO  Sent: 10/11/2021 7:25 AM EDT  Subject: Prescription Question    Hello. Im currently on a low estrogen birth control pill but im over it. Im bleeding all the time. Its been like this for months. What's the next step up from what im taking? I do the take 4 packs and then take the last week so im only supposed to get my period quarterly. Please help! Im supposed to start a new pack Sunday and would like to start a new one to try to get my body back on track. Please let me know.  Thanks  Uriel Reeder

## 2021-10-12 ENCOUNTER — TELEPHONE (OUTPATIENT)
Dept: FAMILY MEDICINE CLINIC | Age: 41
End: 2021-10-12

## 2021-10-12 NOTE — TELEPHONE ENCOUNTER
Patient states that Isabelle Burks wanted patient to come in to refill and discuss her birth control. Patient states that she is supposed to start a new birth control pack this Sunday and would like to be seen before then.  Please call patient to work in at [phone]

## 2021-10-12 NOTE — TELEPHONE ENCOUNTER
Call to pt, advised to come to [facility] on Friday per Dr. Maximiliano Patel to be seen. Voiced understanding.

## 2021-10-15 ENCOUNTER — OFFICE VISIT (OUTPATIENT)
Dept: PRIMARY CARE CLINIC | Age: 41
End: 2021-10-15
Payer: COMMERCIAL

## 2021-10-15 VITALS
HEART RATE: 85 BPM | HEIGHT: 63 IN | WEIGHT: 123.6 LBS | SYSTOLIC BLOOD PRESSURE: 122 MMHG | BODY MASS INDEX: 21.9 KG/M2 | OXYGEN SATURATION: 100 % | TEMPERATURE: 97.2 F | DIASTOLIC BLOOD PRESSURE: 86 MMHG

## 2021-10-15 DIAGNOSIS — N92.0 MENORRHAGIA WITH REGULAR CYCLE: Primary | ICD-10-CM

## 2021-10-15 PROCEDURE — 99214 OFFICE O/P EST MOD 30 MIN: CPT | Performed by: FAMILY MEDICINE

## 2021-10-15 NOTE — PROGRESS NOTES
[address]  [address] Dorina Rodriguez  [phone]      Frederic Dumont is a 42 y.o. female who is c/o of Discuss Medications (change bc)      HPI:     HPI    Pt only takes the placebo pills every 4 months -   Will have sporadic spotting for 1-2 days; very unpredictable    Another month, she had a normal period for a week when she wasn't on the placebo pills yet    Had a period last week that she should not have had yet, and then she started spotting again 4 days ago. Had endometrial ablation that lasted 5-7 years, but then she started having heavy/painful periods again.             Subjective:      Past Medical History:   Diagnosis Date    Allergic rhinitis     GERD (gastroesophageal reflux disease)     Hemorrhoids     Hyperlipidemia     Hypertension     treated in past but lost weight currently on no medication    Hypoglycemia     Kidney stones     Tobacco abuse     UTI (urinary tract infection)     frequency      Past Surgical History:   Procedure Laterality Date    CARPAL TUNNEL RELEASE Left     ENDOMETRIAL ABLATION      LITHOTRIPSY      NASAL SEPTUM SURGERY      OTHER SURGICAL HISTORY Right 09/07/2016    laser ureteroscopic lithotripsy    OK EGD TRANSORAL BIOPSY SINGLE/MULTIPLE N/A 3/13/2017    EGD BIOPSY performed by Octavio Leigh MD at [address] [facility] Bilateral     approximately 2013   [facility]  2010    hiatal hernia     UPPER GASTROINTESTINAL ENDOSCOPY  03/13/2017    mild gastritis, Dr. Sejal Patel N/A 3/28/2019    EGD BIOPSY W/48 HR GASCA performed by Octavio Leigh MD at [facility] OR.mild inflammation and gastric polyps       Social History     Tobacco Use    Smoking status: Current Every Day Smoker     Packs/day: 0.50     Years: 10.00     Pack years: 5.00     Start date: 2012    Smokeless tobacco: Never Used    Tobacco comment: vaping Substance Use Topics    Alcohol use: Not Currently     Comment: social      Current Outpatient Medications   Medication Sig Dispense Refill    norethindrone-ethinyl estradiol (NECON) 0.5-35 MG-MCG per tablet Take 1 tab by mouth daily x 21 days, then skip placebo pills and start new pack. 3 packet 1    blood glucose monitor strips Test 1 time a day & as needed for symptoms of irregular blood glucose. 200 strip 3    Lancets MISC Use to check glucose once daily and as needed for irregular blood glucose. 200 each 3    Dulaglutide (TRULICITY) 5.49 RV/5.4ZH SOPN Inject 0.75 mg into the skin once a week 12 pen 3    famotidine (PEPCID) 20 MG tablet Take 1 tablet by mouth nightly 60 tablet 3    esomeprazole (NEXIUM) 40 MG delayed release capsule Take 1 capsule by mouth daily 180 capsule 3    loratadine (CLARITIN) 10 MG tablet Take two tablets once daily 180 tablet 3    NONFORMULARY Take 1 capsule by mouth THRIVE capsule, nutritional shake and patch      montelukast (SINGULAIR) 10 MG tablet montelukast 10 mg tablet      fluticasone (FLONASE) 50 MCG/ACT nasal spray 2 sprays to each nostril once daily. 3 Bottle 3    albuterol sulfate  (90 Base) MCG/ACT inhaler Inhale 2 puffs into the lungs 4 times daily as needed for Wheezing 1 Inhaler 0    dicyclomine (BENTYL) 10 MG capsule Take 1 capsule by mouth 4 times daily as needed (for lower GI symptoms) (Patient not taking: Reported on 10/15/2021) 120 capsule 3     No current facility-administered medications for this visit.      Allergies   Allergen Reactions    Latex Dermatitis    Amoxil [Amoxicillin] Diarrhea    Bactrim [Sulfamethoxazole-Trimethoprim] Itching    Clindamycin/Lincomycin      Nausea,vomiting    Flagyl [Metronidazole] Other (See Comments)     Sees bugs crawling    Biaxin [Clarithromycin] Nausea And Vomiting    Ceftin [Cefuroxime Axetil] Diarrhea and Nausea And Vomiting    Keflex [Cephalexin] Nausea And Vomiting       Review of Systems    Objective:     Vitals:    10/15/21 0939   BP: 122/86   Site: Right Upper Arm   Position: Sitting   Cuff Size: Medium Adult   Pulse: 85   Temp: 97.2 °F (36.2 °C)   TempSrc: Temporal   SpO2: 100%   Weight: 123 lb 9.6 oz (56.1 kg)   Height: 5' 3\" (1.6 m)     Physical Exam  Constitutional:       General: She is not in acute distress. Appearance: Normal appearance. HENT:      Head: Normocephalic and atraumatic. Eyes:      Conjunctiva/sclera: Conjunctivae normal.   Cardiovascular:      Rate and Rhythm: Normal rate and regular rhythm. Heart sounds: Normal heart sounds. Pulmonary:      Effort: Pulmonary effort is normal. No respiratory distress. Breath sounds: Normal breath sounds. Abdominal:      General: Bowel sounds are normal. There is no distension. Palpations: Abdomen is soft. Tenderness: There is no abdominal tenderness. Musculoskeletal:      Right lower leg: No edema. Left lower leg: No edema. Skin:     General: Skin is warm and dry. Neurological:      General: No focal deficit present. Mental Status: She is alert and oriented to person, place, and time. Psychiatric:         Mood and Affect: Mood normal.         Assessment:       Diagnosis Orders   1. Menorrhagia with regular cycle  norethindrone-ethinyl estradiol (NECON) 0.5-35 MG-MCG per tablet       Plan:      Return if symptoms worsen or fail to improve. No orders of the defined types were placed in this encounter. Orders Placed This Encounter   Medications    norethindrone-ethinyl estradiol (NECON) 0.5-35 MG-MCG per tablet     Sig: Take 1 tab by mouth daily x 21 days, then skip placebo pills and start new pack. Dispense:  3 packet     Refill:  1     Please dispense every 21 days so patient can continue hormonal pills continuously. Patient given educational materials - see patient instructions. Discussed use, benefit, and side effects of prescribed medications.   All patient questions answered. Pt voiced understanding.        Electronically signed by Bridgette Mcelroy DO, DO on 10/25/2021 at 12:24 AM

## 2021-11-16 ENCOUNTER — OFFICE VISIT (OUTPATIENT)
Dept: PRIMARY CARE CLINIC | Age: 41
End: 2021-11-16
Payer: COMMERCIAL

## 2021-11-16 ENCOUNTER — HOSPITAL ENCOUNTER (OUTPATIENT)
Age: 41
Setting detail: SPECIMEN
Discharge: HOME | End: 2021-11-16
Payer: COMMERCIAL

## 2021-11-16 VITALS
HEART RATE: 93 BPM | TEMPERATURE: 99.4 F | OXYGEN SATURATION: 97 % | WEIGHT: 120 LBS | BODY MASS INDEX: 21.26 KG/M2 | SYSTOLIC BLOOD PRESSURE: 136 MMHG | DIASTOLIC BLOOD PRESSURE: 90 MMHG | HEIGHT: 63 IN

## 2021-11-16 DIAGNOSIS — R82.71 BACTERIA IN URINE: ICD-10-CM

## 2021-11-16 DIAGNOSIS — R30.0 DYSURIA: ICD-10-CM

## 2021-11-16 DIAGNOSIS — R10.9 FLANK PAIN: Primary | ICD-10-CM

## 2021-11-16 DIAGNOSIS — R10.9 FLANK PAIN: ICD-10-CM

## 2021-11-16 LAB
-: ABNORMAL
AMORPHOUS: ABNORMAL
BACTERIA: ABNORMAL
BILIRUBIN URINE: NEGATIVE
CASTS UA: ABNORMAL /LPF (ref 0–2)
COLOR: ABNORMAL
COMMENT UA: ABNORMAL
CRYSTALS, UA: ABNORMAL /HPF
EPITHELIAL CELLS UA: ABNORMAL /HPF (ref 0–5)
GLUCOSE URINE: ABNORMAL
KETONES, URINE: NEGATIVE
LEUKOCYTE ESTERASE, URINE: ABNORMAL
MUCUS: ABNORMAL
NITRITE, URINE: POSITIVE
OTHER OBSERVATIONS UA: ABNORMAL
PH UA: 7 (ref 5–6)
PROTEIN UA: NEGATIVE
RBC UA: ABNORMAL /HPF (ref 0–4)
RENAL EPITHELIAL, UA: ABNORMAL /HPF
SPECIFIC GRAVITY UA: 1.02 (ref 1.01–1.02)
TRICHOMONAS: ABNORMAL
TURBIDITY: ABNORMAL
URINE HGB: NEGATIVE
UROBILINOGEN, URINE: ABNORMAL
WBC UA: ABNORMAL /HPF (ref 0–4)
YEAST: ABNORMAL

## 2021-11-16 PROCEDURE — 87086 URINE CULTURE/COLONY COUNT: CPT

## 2021-11-16 PROCEDURE — 99214 OFFICE O/P EST MOD 30 MIN: CPT | Performed by: FAMILY MEDICINE

## 2021-11-16 PROCEDURE — 81001 URINALYSIS AUTO W/SCOPE: CPT

## 2021-11-16 RX ORDER — PHENAZOPYRIDINE HYDROCHLORIDE 200 MG/1
200 TABLET, FILM COATED ORAL 3 TIMES DAILY PRN
Qty: 6 TABLET | Refills: 0 | Status: SHIPPED | OUTPATIENT
Start: 2021-11-16 | End: 2021-11-18

## 2021-11-16 RX ORDER — CIPROFLOXACIN 500 MG/1
500 TABLET, FILM COATED ORAL 2 TIMES DAILY
Qty: 6 TABLET | Refills: 0 | Status: SHIPPED | OUTPATIENT
Start: 2021-11-16 | End: 2021-11-19

## 2021-11-17 NOTE — PROGRESS NOTES
Patient states that pain started on Sunday on the left side of the abdominal area and has spread to the bladder area. Patient states that she has taken pyridium and does have hx of kidney stones, last time had stones was in June 2021.

## 2021-11-17 NOTE — PROGRESS NOTES
[facility]             [address] [place] [address], [facility] [address]                        Telephone [phone]             Fax [phone]     Emily Kevin  :  1980  Age:  42 y.o. MRN:  [id number]  Date of visit:  2021       Assessment and Plan:    1. Flank pain  2. Bacteria in urine  Results of the urinalysis done today were reviewed with the patient. - Culture, Urine; Future was ordered. Cipro was prescribed:  - ciprofloxacin (CIPRO) 500 MG tablet; Take 1 tablet by mouth 2 times daily for 3 days  Dispense: 6 tablet; Refill: 0    She will be contacted when the ID and sensitivity results are available. She was advised to follow up if symptoms worsen or do not resolve. 3. Dysuria  Pyridium was refilled:  - phenazopyridine (PYRIDIUM) 200 MG tablet; Take 1 tablet by mouth 3 times daily as needed for Pain  Dispense: 6 tablet; Refill: 0           Subjective:  Estelita Valadez is a 42 y.o. female who presents to [facility] today (2021) for evaluation of: Urinary Tract Infection       She reports that she had left flank pain 2 days ago. Today, she reports bladder spasms. She also reports chills. She also reports nausea. She has a history of kidney stones. She has the following problem list:  Patient Active Problem List   Diagnosis    Hyperlipidemia    Tobacco use    Vertigo, benign paroxysmal    Type 2 diabetes mellitus without complication, without long-term current use of insulin ([facility])    LPRD (laryngopharyngeal reflux disease)    Seasonal allergic rhinitis due to pollen    Sinus problem        Current medications are:  Current Outpatient Medications   Medication Sig Dispense Refill    norethindrone-ethinyl estradiol (NECON) 0.5-35 MG-MCG per tablet Take 1 tab by mouth daily x 21 days, then skip placebo pills and start new pack.  3 packet 1    blood glucose monitor strips Test 1 time a day & as needed for symptoms of irregular blood glucose. 200 strip 3    Lancets MISC Use to check glucose once daily and as needed for irregular blood glucose. 200 each 3    famotidine (PEPCID) 20 MG tablet Take 1 tablet by mouth nightly 60 tablet 3    dicyclomine (BENTYL) 10 MG capsule Take 1 capsule by mouth 4 times daily as needed (for lower GI symptoms) 120 capsule 3    esomeprazole (NEXIUM) 40 MG delayed release capsule Take 1 capsule by mouth daily 180 capsule 3    loratadine (CLARITIN) 10 MG tablet Take two tablets once daily 180 tablet 3    NONFORMULARY Take 1 capsule by mouth THRIVE capsule, nutritional shake and patch      montelukast (SINGULAIR) 10 MG tablet montelukast 10 mg tablet      fluticasone (FLONASE) 50 MCG/ACT nasal spray 2 sprays to each nostril once daily. 3 Bottle 3    albuterol sulfate  (90 Base) MCG/ACT inhaler Inhale 2 puffs into the lungs 4 times daily as needed for Wheezing 1 Inhaler 0     No current facility-administered medications for this visit. She is allergic to latex, amoxil [amoxicillin], bactrim [sulfamethoxazole-trimethoprim], clindamycin/lincomycin, flagyl [metronidazole], biaxin [clarithromycin], ceftin [cefuroxime axetil], and keflex [cephalexin]. She  reports that she has been smoking. She started smoking about 9 years ago. She has a 5.00 pack-year smoking history. She has never used smokeless tobacco.      Objective:  Vitals:    11/16/21 2006   BP: (!) 136/90   Pulse: 93   Temp: 99.4 °F (37.4 °C)   SpO2: 97%   Weight: 120 lb (54.4 kg)   Height: 5' 3\" (1.6 m)         Body mass index is 21.26 kg/m². Well-nourished, well-developed female healthy-appearing, alert, cooperative and in no acute distress. Back has no costovertebral angle tenderness. Abdomen is soft, with mild lower abdominal tenderness to palpation.     Results of the urinalysis done today were reviewed with the patient:  Hospital Outpatient Visit on 11/16/2021   Component Date Value Ref Range Status    Color, UA 11/16/2021 NOT REPORTED  Yellow Final    Turbidity UA 11/16/2021 NOT REPORTED  Clear Final    Glucose, Ur 11/16/2021 TRACE* NEGATIVE Final    Bilirubin Urine 11/16/2021 NEGATIVE  NEGATIVE Final    Ketones, Urine 11/16/2021 NEGATIVE  NEGATIVE Final    Specific [place], UA 11/16/2021 1.020  1.010 - 1.025 Final    Urine Hgb 11/16/2021 NEGATIVE  NEGATIVE Final    pH, UA 11/16/2021 7.0* 5.0 - 6.0 Final    Protein, UA 11/16/2021 NEGATIVE  NEGATIVE Final    Urobilinogen, Urine 11/16/2021 2 mg/dL* Normal Final    Nitrite, Urine 11/16/2021 POSITIVE* NEGATIVE Final    Leukocyte Esterase, Urine 11/16/2021 TRACE* NEGATIVE Final    Urinalysis Comments 11/16/2021 INTERPRET WITH CAUTION DUE TO INTENSE COLOR OF URINE. Final    - 11/16/2021        Final    WBC, UA 11/16/2021 0 TO 4  0 - 4 /HPF Final    RBC, UA 11/16/2021 5 TO 10  0 - 4 /HPF Final    Casts UA 11/16/2021 NOT REPORTED  0 - 2 /LPF Final    Crystals, UA 11/16/2021 NOT REPORTED  None /HPF Final    Epithelial Cells UA 11/16/2021 5 TO 10  0 - 5 /HPF Final    Renal Epithelial, UA 11/16/2021 NOT REPORTED  0 /HPF Final    Bacteria, UA 11/16/2021 TRACE* None Final    Mucus, UA 11/16/2021 1+* None Final    Trichomonas, UA 11/16/2021 NOT REPORTED  None Final    Amorphous, UA 11/16/2021 1+* None Final    Other Observations UA 11/16/2021 NOT REPORTED  NOT REQ.  Final    Yeast, UA 11/16/2021 NOT REPORTED  None Final           (Please note that portions of this note were completed with a voice-recognition program. Efforts were made to edit the dictation but occasionally words are mis-transcribed.)

## 2021-11-18 LAB
CULTURE: NO GROWTH
Lab: NORMAL
SPECIMEN DESCRIPTION: NORMAL

## 2021-11-30 ENCOUNTER — PATIENT MESSAGE (OUTPATIENT)
Dept: PRIMARY CARE CLINIC | Age: 41
End: 2021-11-30

## 2021-12-01 NOTE — TELEPHONE ENCOUNTER
From: Wayland Cushing  To: Dr. Carlos Enrique Fuentes: 2021 10:00 PM EST  Subject: Prescription    2 things. Екатерина Kirkpatrick said she wants to give this dose a little more time yet, doing ok. Me, my new birth control, not sure i like it, on month 3, its helped the bleeding but im very snippy and very hungry, i drink my keto shake in the morning that usually keeps my hungry until 12-1 and my stomach is growling by 10. Not helping the keto diet. . Not sure if i should wait a coupe more weeks or if u have something else i can try?   Let me know  Emily bran

## 2021-12-07 NOTE — TELEPHONE ENCOUNTER
Call from pt, states Adarsh Hannah has lost her medication and will need a new rx and to be expecting the request from pharmacy, will need filled tonight. Also states her BC is making her grumpy and she isn't sure if it is worth it to feel like that every day. Is interested in trying something else. Would like to continue with pill forms, does not wish to try Depo or any IUD/Nexplanon at this time.

## 2021-12-27 ENCOUNTER — OFFICE VISIT (OUTPATIENT)
Dept: PRIMARY CARE CLINIC | Age: 41
End: 2021-12-27
Payer: COMMERCIAL

## 2021-12-27 ENCOUNTER — HOSPITAL ENCOUNTER (OUTPATIENT)
Age: 41
Setting detail: SPECIMEN
Discharge: HOME | End: 2021-12-27
Payer: COMMERCIAL

## 2021-12-27 VITALS
TEMPERATURE: 98.6 F | BODY MASS INDEX: 23.04 KG/M2 | HEART RATE: 111 BPM | WEIGHT: 130 LBS | OXYGEN SATURATION: 96 % | HEIGHT: 63 IN | DIASTOLIC BLOOD PRESSURE: 100 MMHG | SYSTOLIC BLOOD PRESSURE: 150 MMHG

## 2021-12-27 DIAGNOSIS — R09.81 CONGESTION OF NASAL SINUS: ICD-10-CM

## 2021-12-27 DIAGNOSIS — J06.9 VIRAL UPPER RESPIRATORY ILLNESS: Primary | ICD-10-CM

## 2021-12-27 DIAGNOSIS — R05.9 COUGH: ICD-10-CM

## 2021-12-27 DIAGNOSIS — J06.9 VIRAL UPPER RESPIRATORY ILLNESS: ICD-10-CM

## 2021-12-27 DIAGNOSIS — U07.1 COVID: ICD-10-CM

## 2021-12-27 DIAGNOSIS — R50.9 FEVER, UNSPECIFIED FEVER CAUSE: ICD-10-CM

## 2021-12-27 PROCEDURE — U0005 INFEC AGEN DETEC AMPLI PROBE: HCPCS

## 2021-12-27 PROCEDURE — U0003 INFECTIOUS AGENT DETECTION BY NUCLEIC ACID (DNA OR RNA); SEVERE ACUTE RESPIRATORY SYNDROME CORONAVIRUS 2 (SARS-COV-2) (CORONAVIRUS DISEASE [COVID-19]), AMPLIFIED PROBE TECHNIQUE, MAKING USE OF HIGH THROUGHPUT TECHNOLOGIES AS DESCRIBED BY CMS-2020-01-R: HCPCS

## 2021-12-27 PROCEDURE — 99213 OFFICE O/P EST LOW 20 MIN: CPT | Performed by: NURSE PRACTITIONER

## 2021-12-27 ASSESSMENT — ENCOUNTER SYMPTOMS
VOMITING: 0
RHINORRHEA: 1
SORE THROAT: 1
EYES NEGATIVE: 1
ABDOMINAL DISTENTION: 0
DIARRHEA: 0
CONSTIPATION: 0
COUGH: 1
NAUSEA: 0

## 2021-12-27 ASSESSMENT — PATIENT HEALTH QUESTIONNAIRE - PHQ9
1. LITTLE INTEREST OR PLEASURE IN DOING THINGS: 0
SUM OF ALL RESPONSES TO PHQ9 QUESTIONS 1 & 2: 0
SUM OF ALL RESPONSES TO PHQ QUESTIONS 1-9: 0
2. FEELING DOWN, DEPRESSED OR HOPELESS: 0
SUM OF ALL RESPONSES TO PHQ QUESTIONS 1-9: 0
SUM OF ALL RESPONSES TO PHQ QUESTIONS 1-9: 0

## 2021-12-27 NOTE — LETTER
[address]  [address]  Dept: [phone]  Dept Fax: [phone]: [phone]    Antony Mueller  YOB: 1980      To Whom it May Concern:      Emily Phillip was seen in my clinic on 12/27/2021. They may return to work/school after a 10 days of quarantine and resolution of symptoms and be fever free for at least 24 hours prior to returning to work/school. Jan 4th, 2022 is the earliest date that she may return to work. If you have any questions or concerns, please don't hesitate to call.     Sincerely,       Clovia Litten, APRN - FNP-C

## 2021-12-28 NOTE — PATIENT INSTRUCTIONS
Patient Education   COVID swab was obtained today. Results may take 1-3 days. COVID work or school note given. Remain in quarantine until results are back. Please go to the emergency room if you become more short of breath, have weakness or extreme fatigue or if you feel you may be dehydrated. You may call the office if it is during normal business hours and request a nurse visit where we check you pulse oximetry/oxygen level. If your level is too low you will be sent to the hospital for further treatment. You are being provided a work or school excuse so you may quarantine until you test results are back. If you are COVID positive you will be given an additional excuse to lengthen your quarantine. The use of an antihistamine (Claritin or Zyrtec) and a nasal steroid spray (Flonase or Nasacort) may help with sinus congestion and drainage. Mucinex will also help thin secretions and improve congestion. Make sure to stay well hydrated by drinking plenty of water. If you are laying down more than usual, change positions frequently. Practice coughing and deep breathing every hour while awake. If you are allowed to take tylenol or ibuprofen you may take these to relieve fever, chills or body aches. Follow up with primary care provider in 1 to 2 days or as needed. Learning About Coronavirus [phone])  What is coronavirus (COVID-19)? COVID-19 is a disease caused by a type of coronavirus. This illness was first found in December 2019. It has since spread worldwide. Coronaviruses are a large group of viruses. They cause the common cold. They also cause more serious illnesses like [place] respiratory syndrome (MERS) and severe acute respiratory syndrome (SARS). COVID-19 is caused by a novel coronavirus. That means it's a new type that has not been seen in people before. What are the symptoms? COVID-19 symptoms may include:  · Fever. · Cough.   · Trouble breathing. · Chills or repeated shaking with chills. · Muscle and body aches. · Headache. · Sore throat. · New loss of taste or smell. · Vomiting. · Diarrhea. In severe cases, COVID-19 can cause pneumonia and make it hard to breathe without help from a machine. It can cause death. How is it diagnosed? COVID-19 is diagnosed with a viral test. This may also be called a PCR test or antigen test. It looks for evidence of the virus in your breathing passages or lungs (respiratory system). The test is most often done on a sample from the nose, throat, or lungs. It's sometimes done on a sample of saliva. One way a sample is collected is by putting a long swab into the back of your nose. How is it treated? Mild cases of COVID-19 can be treated at home. Serious cases need treatment in the hospital. Treatment may include medicines to reduce symptoms, plus breathing support such as oxygen therapy or a ventilator. Some people may be placed on their belly to help their oxygen levels. Treatments that may help people who have COVID-19 include:  Antiviral medicines. These medicines treat viral infections. Remdesivir is an example. Immune-based therapy. These medicines help the immune system fight COVID-19. Examples include monoclonal antibodies. Blood thinners. These medicines help prevent blood clots. People with severe illness are at risk for blood clots. How can you protect yourself and others? · Get vaccinated. · Avoid sick people. · Cover your mouth with a tissue when you cough or sneeze. · Wash your hands often, especially after you cough or sneeze. Use soap and water, and scrub for at least 20 seconds. If soap and water aren't available, use an alcohol-based hand . · Avoid touching your mouth, nose, and eyes. Be sure to follow all instructions from the . [facility] and your local health authorities. Here are some examples of specific precautions you may need to take.   · If you are not fully people. · Avoid contact with pets and other animals. · Cover your mouth and nose with a tissue when you cough or sneeze. Then throw it in the trash right away. · Wash your hands often, especially after you cough or sneeze. Use soap and water, and scrub for at least 20 seconds. If soap and water aren't available, use an alcohol-based hand . · Don't share personal household items. These include bedding, towels, cups and glasses, and eating utensils. · [address] in the warmest water allowed for the fabric type, and dry it completely. It's okay to wash other people's laundry with yours. · Clean and disinfect your home. Use household  and disinfectant wipes or sprays. When should you call for help? Call 911 anytime you think you may need emergency care. For example, call if you have life-threatening symptoms, such as:    · You have severe trouble breathing. (You can't talk at all.)     · You have constant chest pain or pressure.     · You are severely dizzy or lightheaded.     · You are confused or can't think clearly.     · You have pale, gray, or blue-colored skin or lips.     · You pass out (lose consciousness) or are very hard to wake up. Call your doctor now or seek immediate medical care if:    · You have moderate trouble breathing. (You can't speak a full sentence.)     · You are coughing up blood (more than about 1 teaspoon).     · You have signs of low blood pressure. These include feeling lightheaded; being too weak to stand; and having cold, pale, clammy skin. Watch closely for changes in your health, and be sure to contact your doctor if:    · Your symptoms get worse.     · You are not getting better as expected.     · You have new or worse symptoms of anxiety, depression, nightmares, or flashbacks. Call before you go to the doctor's office. Follow their instructions. And wear a mask.   Current as of: July 1, 2021               Content Version: 13.1  © 0242-0521 [employer]. Care instructions adapted under license by [facility] ([facility]). If you have questions about a medical condition or this instruction, always ask your healthcare professional. [address] any warranty or liability for your use of this information.

## 2021-12-29 LAB
SARS-COV-2: ABNORMAL
SARS-COV-2: DETECTED
SOURCE: ABNORMAL

## 2021-12-31 ENCOUNTER — PATIENT MESSAGE (OUTPATIENT)
Dept: PRIMARY CARE CLINIC | Age: 41
End: 2021-12-31

## 2022-01-02 NOTE — TELEPHONE ENCOUNTER
From: Jamia Human  To: Dr. Song Southern: 12/31/2021 2:21 PM EST  Subject: Birth control    So im on the last part of week 2 of my 4th month pill. This pill doesn't seem to help the breakthrough bleeding. In fact, guzman been bleeding for a week and the past couple days it has been really bad, the worst guzman had since i got the ablasion. I have really bad cramps also. You don't think i had a cyst that ruptured? I don't have any refills on my prescription, not sure if you want to try something new? I have an appt with an obgyn but i have to reschedule out a couple weeks florecita i have covid. Thinking about getting a,partial hysterectomy. Please let me know what u think. I have one well left of my pill.  Thanks  Callum Hughes

## 2022-01-04 ENCOUNTER — APPOINTMENT (OUTPATIENT)
Dept: GENERAL RADIOLOGY | Age: 42
End: 2022-01-04
Payer: COMMERCIAL

## 2022-01-04 ENCOUNTER — HOSPITAL ENCOUNTER (EMERGENCY)
Age: 42
Discharge: HOME | End: 2022-01-04
Attending: EMERGENCY MEDICINE
Payer: COMMERCIAL

## 2022-01-04 VITALS
OXYGEN SATURATION: 96 % | DIASTOLIC BLOOD PRESSURE: 87 MMHG | HEIGHT: 63 IN | TEMPERATURE: 99.5 F | RESPIRATION RATE: 16 BRPM | SYSTOLIC BLOOD PRESSURE: 138 MMHG | HEART RATE: 77 BPM | WEIGHT: 120 LBS | BODY MASS INDEX: 21.26 KG/M2

## 2022-01-04 DIAGNOSIS — U07.1 COVID-19 VIRUS INFECTION: Primary | ICD-10-CM

## 2022-01-04 DIAGNOSIS — E87.6 HYPOKALEMIA: ICD-10-CM

## 2022-01-04 LAB
-: ABNORMAL
ABSOLUTE EOS #: 0.03 K/UL (ref 0–0.44)
ABSOLUTE IMMATURE GRANULOCYTE: 0.03 K/UL (ref 0–0.3)
ABSOLUTE LYMPH #: 1.62 K/UL (ref 1.1–3.7)
ABSOLUTE MONO #: 0.31 K/UL (ref 0.1–1.2)
ALBUMIN SERPL-MCNC: 4.2 G/DL (ref 3.5–5.2)
ALBUMIN/GLOBULIN RATIO: 1.5 (ref 1–2.5)
ALP BLD-CCNC: 63 U/L (ref 35–104)
ALT SERPL-CCNC: 37 U/L (ref 5–33)
AMORPHOUS: ABNORMAL
ANION GAP SERPL CALCULATED.3IONS-SCNC: 11 MMOL/L (ref 9–17)
AST SERPL-CCNC: 27 U/L
BACTERIA: ABNORMAL
BASOPHILS # BLD: 0 % (ref 0–2)
BASOPHILS ABSOLUTE: <0.03 K/UL (ref 0–0.2)
BILIRUB SERPL-MCNC: 0.18 MG/DL (ref 0.3–1.2)
BILIRUBIN DIRECT: 0.12 MG/DL
BILIRUBIN URINE: NEGATIVE
BILIRUBIN, INDIRECT: 0.06 MG/DL (ref 0–1)
BUN BLDV-MCNC: 11 MG/DL (ref 6–20)
BUN/CREAT BLD: 20 (ref 9–20)
CALCIUM SERPL-MCNC: 9 MG/DL (ref 8.6–10.4)
CASTS UA: ABNORMAL /LPF (ref 0–2)
CHLORIDE BLD-SCNC: 101 MMOL/L (ref 98–107)
CO2: 26 MMOL/L (ref 20–31)
COLOR: ABNORMAL
COMMENT UA: ABNORMAL
CREAT SERPL-MCNC: 0.54 MG/DL (ref 0.5–0.9)
CRYSTALS, UA: ABNORMAL /HPF
D-DIMER QUANTITATIVE: 0.31 MG/L FEU (ref 0–0.59)
DIFFERENTIAL TYPE: ABNORMAL
EOSINOPHILS RELATIVE PERCENT: 1 % (ref 1–4)
EPITHELIAL CELLS UA: ABNORMAL /HPF (ref 0–5)
GFR AFRICAN AMERICAN: >60 ML/MIN
GFR NON-AFRICAN AMERICAN: >60 ML/MIN
GFR SERPL CREATININE-BSD FRML MDRD: ABNORMAL ML/MIN/{1.73_M2}
GFR SERPL CREATININE-BSD FRML MDRD: ABNORMAL ML/MIN/{1.73_M2}
GLOBULIN: 2.8 G/DL (ref 1.5–3.8)
GLUCOSE BLD-MCNC: 158 MG/DL (ref 70–99)
GLUCOSE URINE: NEGATIVE
HCT VFR BLD CALC: 41.4 % (ref 36.3–47.1)
HEMOGLOBIN: 14.3 G/DL (ref 11.9–15.1)
IMMATURE GRANULOCYTES: 1 %
KETONES, URINE: ABNORMAL
LEUKOCYTE ESTERASE, URINE: NEGATIVE
LIPASE: 49 U/L (ref 13–60)
LYMPHOCYTES # BLD: 29 % (ref 24–43)
MCH RBC QN AUTO: 32.5 PG (ref 25.2–33.5)
MCHC RBC AUTO-ENTMCNC: 34.5 G/DL (ref 25.2–33.5)
MCV RBC AUTO: 94.1 FL (ref 82.6–102.9)
MONOCYTES # BLD: 6 % (ref 3–12)
MUCUS: ABNORMAL
NITRITE, URINE: NEGATIVE
NRBC AUTOMATED: 0 PER 100 WBC
OTHER OBSERVATIONS UA: ABNORMAL
PDW BLD-RTO: 11.8 % (ref 11.8–14.4)
PH UA: 7 (ref 5–6)
PLATELET # BLD: 152 K/UL (ref 138–453)
PLATELET ESTIMATE: ABNORMAL
PMV BLD AUTO: 10.7 FL (ref 8.1–13.5)
POTASSIUM SERPL-SCNC: 3.5 MMOL/L (ref 3.7–5.3)
PROTEIN UA: NEGATIVE
RBC # BLD: 4.4 M/UL (ref 3.95–5.11)
RBC # BLD: ABNORMAL 10*6/UL
RBC UA: ABNORMAL /HPF (ref 0–4)
RENAL EPITHELIAL, UA: ABNORMAL /HPF
SEG NEUTROPHILS: 63 % (ref 36–65)
SEGMENTED NEUTROPHILS ABSOLUTE COUNT: 3.68 K/UL (ref 1.5–8.1)
SODIUM BLD-SCNC: 138 MMOL/L (ref 135–144)
SPECIFIC GRAVITY UA: 1.02 (ref 1.01–1.02)
TOTAL PROTEIN: 7 G/DL (ref 6.4–8.3)
TRICHOMONAS: ABNORMAL
TURBIDITY: ABNORMAL
URINE HGB: NEGATIVE
UROBILINOGEN, URINE: NORMAL
WBC # BLD: 5.7 K/UL (ref 3.5–11.3)
WBC # BLD: ABNORMAL 10*3/UL
WBC UA: ABNORMAL /HPF (ref 0–4)
YEAST: ABNORMAL

## 2022-01-04 PROCEDURE — 99284 EMERGENCY DEPT VISIT MOD MDM: CPT

## 2022-01-04 PROCEDURE — 85379 FIBRIN DEGRADATION QUANT: CPT

## 2022-01-04 PROCEDURE — 96360 HYDRATION IV INFUSION INIT: CPT

## 2022-01-04 PROCEDURE — 6370000000 HC RX 637 (ALT 250 FOR IP): Performed by: SPECIALIST

## 2022-01-04 PROCEDURE — 71045 X-RAY EXAM CHEST 1 VIEW: CPT

## 2022-01-04 PROCEDURE — 2580000003 HC RX 258: Performed by: EMERGENCY MEDICINE

## 2022-01-04 PROCEDURE — 80076 HEPATIC FUNCTION PANEL: CPT

## 2022-01-04 PROCEDURE — 81001 URINALYSIS AUTO W/SCOPE: CPT

## 2022-01-04 PROCEDURE — 83690 ASSAY OF LIPASE: CPT

## 2022-01-04 PROCEDURE — 85025 COMPLETE CBC W/AUTO DIFF WBC: CPT

## 2022-01-04 PROCEDURE — 80048 BASIC METABOLIC PNL TOTAL CA: CPT

## 2022-01-04 RX ORDER — 0.9 % SODIUM CHLORIDE 0.9 %
1000 INTRAVENOUS SOLUTION INTRAVENOUS ONCE
Status: COMPLETED | OUTPATIENT
Start: 2022-01-04 | End: 2022-01-04

## 2022-01-04 RX ORDER — POTASSIUM CHLORIDE 20 MEQ/1
20 TABLET, EXTENDED RELEASE ORAL ONCE
Status: COMPLETED | OUTPATIENT
Start: 2022-01-04 | End: 2022-01-04

## 2022-01-04 RX ADMIN — POTASSIUM CHLORIDE 20 MEQ: 20 TABLET, EXTENDED RELEASE ORAL at 20:29

## 2022-01-04 RX ADMIN — SODIUM CHLORIDE 1000 ML: 9 INJECTION, SOLUTION INTRAVENOUS at 18:12

## 2022-01-04 ASSESSMENT — ENCOUNTER SYMPTOMS
COUGH: 1
VOMITING: 1
DIARRHEA: 1

## 2022-01-04 ASSESSMENT — PAIN SCALES - GENERAL
PAINLEVEL_OUTOF10: 0
PAINLEVEL_OUTOF10: 4

## 2022-01-04 ASSESSMENT — PAIN DESCRIPTION - LOCATION: LOCATION: GENERALIZED

## 2022-01-04 ASSESSMENT — PAIN DESCRIPTION - DESCRIPTORS: DESCRIPTORS: ACHING

## 2022-01-04 ASSESSMENT — PAIN DESCRIPTION - PAIN TYPE: TYPE: ACUTE PAIN

## 2022-01-04 NOTE — ED PROVIDER NOTES
[facility] ED  [address]  [place] [address]  Phone: [phone]        Pt Name: Jordyn Massey  MRN: [id number]  Paul 1980  Date of evaluation: 1/4/22      CHIEF COMPLAINT     Chief Complaint   Patient presents with    Cough     feels like cant breath or talk without coughing    Positive For Covid-19         HISTORY OF PRESENT ILLNESS  (Location/Symptom, Timing/Onset, Context/Setting, Quality, Duration, Modifying Factors, Severity.)    Emily Cheng is a 42 y.o. female who presents with known Covid. The patient states that she was diagnosed with Covid she started getting symptoms approximately 10 days ago she still has a cough the patient states with this she also has some lower abdominal discomfort she states she lost her sense of smell and taste she has had fever chills she has had some occasional vomiting loose stools she has had some body aches nothing she does makes her symptoms better or worse      REVIEW OF SYSTEMS    (2-9 systems for level 4, 10 or more for level 5)     Review of Systems   Constitutional: Positive for chills and fever. Respiratory: Positive for cough. Gastrointestinal: Positive for diarrhea and vomiting. Musculoskeletal: Positive for myalgias. All other systems reviewed and are negative. PAST MEDICAL HISTORY    has a past medical history of Allergic rhinitis, GERD (gastroesophageal reflux disease), Hemorrhoids, Hyperlipidemia, Hypertension, Hypoglycemia, Kidney stones, Tobacco abuse, and UTI (urinary tract infection). SURGICAL HISTORY      has a past surgical history that includes Lithotripsy; Tubal ligation; Endometrial ablation; Carpal tunnel release (Left); Refractive surgery (Bilateral); other surgical history (Right, 09/07/2016); pr egd transoral biopsy single/multiple (N/A, 3/13/2017); Upper gastrointestinal endoscopy (2010); Upper gastrointestinal endoscopy (03/13/2017);  Nasal septum surgery; and Upper gastrointestinal endoscopy (N/A, 3/28/2019). CURRENTMEDICATIONS       Previous Medications    ALBUTEROL SULFATE  (90 BASE) MCG/ACT INHALER    Inhale 2 puffs into the lungs 4 times daily as needed for Wheezing    BLOOD GLUCOSE MONITOR STRIPS    Test 1 time a day & as needed for symptoms of irregular blood glucose. DICYCLOMINE (BENTYL) 10 MG CAPSULE    Take 1 capsule by mouth 4 times daily as needed (for lower GI symptoms)    ESOMEPRAZOLE (NEXIUM) 40 MG DELAYED RELEASE CAPSULE    Take 1 capsule by mouth daily    FAMOTIDINE (PEPCID) 20 MG TABLET    Take 1 tablet by mouth nightly    FLUTICASONE (FLONASE) 50 MCG/ACT NASAL SPRAY    2 sprays to each nostril once daily. LANCETS MISC    Use to check glucose once daily and as needed for irregular blood glucose. LORATADINE (CLARITIN) 10 MG TABLET    Take two tablets once daily    MONTELUKAST (SINGULAIR) 10 MG TABLET    montelukast 10 mg tablet    NONFORMULARY    Take 1 capsule by mouth THRIVE capsule, nutritional shake and patch    NORETHINDRONE-ETHINYL ESTRADIOL (NECON) 0.5-35 MG-MCG PER TABLET    Take 1 tab by mouth daily x 21 days, then skip placebo pills and start new pack. ALLERGIES     is allergic to latex, amoxil [amoxicillin], bactrim [sulfamethoxazole-trimethoprim], clindamycin/lincomycin, flagyl [metronidazole], biaxin [clarithromycin], ceftin [cefuroxime axetil], and keflex [cephalexin]. FAMILY HISTORY     She indicated that her mother is alive. She indicated that her father is alive. family history includes Cancer in her father; Diabetes in her father; High Blood Pressure in her father and mother; Stroke in her mother; Thyroid Disease in her mother. SOCIAL HISTORY      reports that she has been smoking. She started smoking about 10 years ago. She has a 5.00 pack-year smoking history. She has never used smokeless tobacco. She reports previous alcohol use. She reports that she does not use drugs.     PHYSICAL EXAM    (up to 7 for level 4, 8 or more for level 5)   INITIAL VITALS:  height is 5' 3\" (1.6 m) and weight is 120 lb (54.4 kg). Her temperature is 99.5 °F (37.5 °C). Her blood pressure is 159/97 (abnormal) and her pulse is 95. Her respiration is 20 and oxygen saturation is 97%. Physical Exam  Vitals and nursing note reviewed. Constitutional:       Appearance: Normal appearance. HENT:      Head: Normocephalic and atraumatic. Eyes:      Conjunctiva/sclera: Conjunctivae normal.   Cardiovascular:      Rate and Rhythm: Normal rate and regular rhythm. Pulmonary:      Effort: Pulmonary effort is normal. No respiratory distress. Breath sounds: Normal breath sounds. No stridor. No wheezing, rhonchi or rales. Abdominal:      General: Bowel sounds are normal. There is no distension. Palpations: Abdomen is soft. There is no mass. Tenderness: There is abdominal tenderness. There is no right CVA tenderness, left CVA tenderness, guarding or rebound. Comments: Tenderness in the suprapubic region   Musculoskeletal:         General: No swelling or tenderness. Normal range of motion. Cervical back: Normal range of motion and neck supple. No rigidity or tenderness. Lymphadenopathy:      Cervical: No cervical adenopathy. Skin:     General: Skin is warm and dry. Findings: No rash. Neurological:      General: No focal deficit present. Mental Status: She is alert.          DIFFERENTIAL DIAGNOSIS/ MDM:     The patient upon my entry into the room was not tachycardic not hypoxic pulse ox was 96 to 97% and the heart rate was actually in the 80s she was noted to have some suprapubic discomfort I will establish an IV give the patient IV fluids check labs and a UA I will also get a chest x-ray with her main known Covid positive    DIAGNOSTIC RESULTS         RADIOLOGY:        Interpretation per the Radiologist below, if available at the time of this note:    XR CHEST PORTABLE (Final result)  Result time 01/04/22 19:01:34  Final result by Lynne Davis MD (01/04/22 19:01:34)                Impression:    No acute cardiopulmonary process             Narrative:    EXAMINATION:   ONE XRAY VIEW OF THE CHEST     1/4/2022 6:35 pm     COMPARISON:   02/03/2020     HISTORY:   ORDERING SYSTEM PROVIDED HISTORY: cough   TECHNOLOGIST PROVIDED HISTORY:   cough   Reason for Exam: Cough, covid     FINDINGS:   The cardiomediastinal silhouette is normal in size and contour.  The lungs   are clear.  No pleural effusion or pneumothorax is present.                    LABS:  Results for orders placed or performed during the hospital encounter of 80/25/97   Basic Metabolic Panel   Result Value Ref Range    Glucose 158 (H) 70 - 99 mg/dL    BUN 11 6 - 20 mg/dL    CREATININE 0.54 0.50 - 0.90 mg/dL    Bun/Cre Ratio 20 9 - 20    Calcium 9.0 8.6 - 10.4 mg/dL    Sodium 138 135 - 144 mmol/L    Potassium 3.5 (L) 3.7 - 5.3 mmol/L    Chloride 101 98 - 107 mmol/L    CO2 26 20 - 31 mmol/L    Anion Gap 11 9 - 17 mmol/L    GFR Non-African American >60 >60 mL/min    GFR African American >60 >60 mL/min    GFR Comment          GFR Staging NOT REPORTED    CBC Auto Differential   Result Value Ref Range    WBC 5.7 3.5 - 11.3 k/uL    RBC 4.40 3.95 - 5.11 m/uL    Hemoglobin 14.3 11.9 - 15.1 g/dL    Hematocrit 41.4 36.3 - 47.1 %    MCV 94.1 82.6 - 102.9 fL    MCH 32.5 25.2 - 33.5 pg    MCHC 34.5 (H) 25.2 - 33.5 g/dL    RDW 11.8 11.8 - 14.4 %    Platelets 878 397 - 383 k/uL    MPV 10.7 8.1 - 13.5 fL    NRBC Automated 0.0 0.0 per 100 WBC    Differential Type NOT REPORTED     Seg Neutrophils 63 36 - 65 %    Lymphocytes 29 24 - 43 %    Monocytes 6 3 - 12 %    Eosinophils % 1 1 - 4 %    Basophils 0 0 - 2 %    Immature Granulocytes 1 (H) 0 %    Segs Absolute 3.68 1.50 - 8.10 k/uL    Absolute Lymph # 1.62 1.10 - 3.70 k/uL    Absolute Mono # 0.31 0.10 - 1.20 k/uL    Absolute Eos # 0.03 0.00 - 0.44 k/uL    Basophils Absolute <0.03 0.00 - 0.20 k/uL    Absolute Immature Granulocyte 0.03 0.00 - 0.30 k/uL    WBC Morphology NOT REPORTED     RBC Morphology NOT REPORTED     Platelet Estimate NOT REPORTED    Hepatic Function Panel   Result Value Ref Range    Albumin 4.2 3.5 - 5.2 g/dL    Alkaline Phosphatase 63 35 - 104 U/L    ALT 37 (H) 5 - 33 U/L    AST 27 <32 U/L    Total Bilirubin 0.18 (L) 0.3 - 1.2 mg/dL    Bilirubin, Direct 0.12 <0.31 mg/dL    Bilirubin, Indirect 0.06 0.00 - 1.00 mg/dL    Total Protein 7.0 6.4 - 8.3 g/dL    Globulin 2.8 1.5 - 3.8 g/dL    Albumin/Globulin Ratio 1.5 1.0 - 2.5   Lipase   Result Value Ref Range    Lipase 49 13 - 60 U/L   D-Dimer, Quantitative   Result Value Ref Range    D-Dimer, Quant 0.31 0.00 - 0.59 mg/L FEU           EMERGENCY DEPARTMENT COURSE:   Vitals:    Vitals:    01/04/22 1716   BP: (!) 159/97   Pulse: 95   Resp: 20   Temp: 99.5 °F (37.5 °C)   SpO2: 97%   Weight: 120 lb (54.4 kg)   Height: 5' 3\" (1.6 m)     -------------------------  BP: (!) 159/97, Temp: 99.5 °F (37.5 °C), Pulse: 95, Resp: 20        CONSULTS:  Dr. Melissa Pedersen will take over care of the patient per shift change    PROCEDURES:  None    FINAL IMPRESSION    No diagnosis found. DISPOSITION/PLAN   DISPOSITION            PATIENT REFERRED TO:  No follow-up provider specified. DISCHARGE MEDICATIONS:  New Prescriptions    No medications on file       (Please note that portions of this note were completed with a voicerecognition program.  Efforts were made to edit the dictations but occasionally words are mis-transcribed.)    Baljinder Giordano MD,, MD, F.A.C.E.P.   Attending Emergency Medicine Physician       Baljinder Giordano MD  01/04/22 7523

## 2022-01-05 ENCOUNTER — CARE COORDINATION (OUTPATIENT)
Dept: CARE COORDINATION | Age: 42
End: 2022-01-05

## 2022-01-05 NOTE — CARE COORDINATION
Patient was called to follow up with most recent ER visit. There was no answer. A message was left on voicemail to have patient call back regarding ER visit. Office number given [phone].

## 2022-01-05 NOTE — ED PROVIDER NOTES
ADDENDUM:      Care of this patient was assumed from Dr. Adama Boston. The patient was seen for Cough (feels like cant breath or talk without coughing) and Positive For Covid-19  . The patient's initial evaluation and plan have been discussed with the prior provider who initially evaluated the patient. Nursing Notes, Past Medical Hx, Past Surgical Hx, Social Hx, Allergies, and Family Hx were all reviewed. Diagnostic Results       RADIOLOGY:   Non-plain film images such as CT, Ultrasound and MRI are read by the radiologist. [facility] radiographic images are visualized and the radiologist interpretations are reviewed as follows:     XR CHEST PORTABLE    Result Date: 1/4/2022  EXAMINATION: ONE XRAY VIEW OF THE CHEST 1/4/2022 6:35 pm COMPARISON: 02/03/2020 HISTORY: ORDERING SYSTEM PROVIDED HISTORY: cough TECHNOLOGIST PROVIDED HISTORY: cough Reason for Exam: Cough, covid FINDINGS: The cardiomediastinal silhouette is normal in size and contour. The lungs are clear. No pleural effusion or pneumothorax is present.      No acute cardiopulmonary process     LABS:   Results for orders placed or performed during the hospital encounter of 67/42/59   Basic Metabolic Panel   Result Value Ref Range    Glucose 158 (H) 70 - 99 mg/dL    BUN 11 6 - 20 mg/dL    CREATININE 0.54 0.50 - 0.90 mg/dL    Bun/Cre Ratio 20 9 - 20    Calcium 9.0 8.6 - 10.4 mg/dL    Sodium 138 135 - 144 mmol/L    Potassium 3.5 (L) 3.7 - 5.3 mmol/L    Chloride 101 98 - 107 mmol/L    CO2 26 20 - 31 mmol/L    Anion Gap 11 9 - 17 mmol/L    GFR Non-African American >60 >60 mL/min    GFR African American >60 >60 mL/min    GFR Comment          GFR Staging NOT REPORTED    CBC Auto Differential   Result Value Ref Range    WBC 5.7 3.5 - 11.3 k/uL    RBC 4.40 3.95 - 5.11 m/uL    Hemoglobin 14.3 11.9 - 15.1 g/dL    Hematocrit 41.4 36.3 - 47.1 %    MCV 94.1 82.6 - 102.9 fL    MCH 32.5 25.2 - 33.5 pg    MCHC 34.5 (H) 25.2 - 33.5 g/dL    RDW 11.8 11.8 - 14.4 %    Platelets 270 138 - 453 k/uL    MPV 10.7 8.1 - 13.5 fL    NRBC Automated 0.0 0.0 per 100 WBC    Differential Type NOT REPORTED     Seg Neutrophils 63 36 - 65 %    Lymphocytes 29 24 - 43 %    Monocytes 6 3 - 12 %    Eosinophils % 1 1 - 4 %    Basophils 0 0 - 2 %    Immature Granulocytes 1 (H) 0 %    Segs Absolute 3.68 1.50 - 8.10 k/uL    Absolute Lymph # 1.62 1.10 - 3.70 k/uL    Absolute Mono # 0.31 0.10 - 1.20 k/uL    Absolute Eos # 0.03 0.00 - 0.44 k/uL    Basophils Absolute <0.03 0.00 - 0.20 k/uL    Absolute Immature Granulocyte 0.03 0.00 - 0.30 k/uL    WBC Morphology NOT REPORTED     RBC Morphology NOT REPORTED     Platelet Estimate NOT REPORTED    Hepatic Function Panel   Result Value Ref Range    Albumin 4.2 3.5 - 5.2 g/dL    Alkaline Phosphatase 63 35 - 104 U/L    ALT 37 (H) 5 - 33 U/L    AST 27 <32 U/L    Total Bilirubin 0.18 (L) 0.3 - 1.2 mg/dL    Bilirubin, Direct 0.12 <0.31 mg/dL    Bilirubin, Indirect 0.06 0.00 - 1.00 mg/dL    Total Protein 7.0 6.4 - 8.3 g/dL    Globulin 2.8 1.5 - 3.8 g/dL    Albumin/Globulin Ratio 1.5 1.0 - 2.5   Lipase   Result Value Ref Range    Lipase 49 13 - 60 U/L   Urinalysis Reflex to Culture    Specimen: Urine, clean catch   Result Value Ref Range    Color, UA NOT REPORTED Yellow    Turbidity UA NOT REPORTED Clear    Glucose, Ur NEGATIVE NEGATIVE    Bilirubin Urine NEGATIVE NEGATIVE    Ketones, Urine TRACE (A) NEGATIVE    Specific Gravity, UA 1.020 1.010 - 1.025    Urine Hgb NEGATIVE NEGATIVE    pH, UA 7.0 (H) 5.0 - 6.0    Protein, UA NEGATIVE NEGATIVE    Urobilinogen, Urine Normal Normal    Nitrite, Urine NEGATIVE NEGATIVE    Leukocyte Esterase, Urine NEGATIVE NEGATIVE    Urinalysis Comments NOT REPORTED    D-Dimer, Quantitative   Result Value Ref Range    D-Dimer, Quant 0.31 0.00 - 0.59 mg/L FEU   Microscopic Urinalysis   Result Value Ref Range    -          WBC, UA 0 TO 4 0 - 4 /HPF    RBC, UA 0 TO 4 0 - 4 /HPF    Casts UA NOT REPORTED 0 - 2 /LPF    Crystals, UA NOT REPORTED None /HPF Epithelial Cells UA 0 TO 4 0 - 5 /HPF    Renal Epithelial, UA NOT REPORTED 0 /HPF    Bacteria, UA TRACE (A) None    Mucus, UA NOT REPORTED None    Trichomonas, UA NOT REPORTED None    Amorphous, UA NOT REPORTED None    Other Observations UA NOT REPORTED NOT REQ. Yeast, UA NOT REPORTED None       RECENT VITALS:  BP: (!) 159/97, Temp: 99.5 °F (37.5 °C), Pulse: 95, Resp: 20     ED Course     The patient was given the following medications:  Orders Placed This Encounter   Medications    0.9 % sodium chloride bolus    potassium chloride (KLOR-CON M) extended release tablet 20 mEq     During the emergency department course, patient was given normal saline bolus and potassium chloride 20 mEq orally. Medical Decision Making      50-year-old female patient with known Covid infection presents for evaluation of cough, lower abdominal discomfort and loss of sense of smell and taste. Upon reevaluation, patient is resting comfortably and does not appear to be in any pain or distress. Her lungs are clear to auscultation, rhythm is regular without murmurs. Abdomen is soft with mild suprapubic tenderness. There is no rebound tenderness. Diagnostic work-up reveals mild hypokalemia, rest of the labs and chest x-ray are unremarkable. Plan is to discharge the patient with instructions drink plenty of oral fluids, monitor pulse oximetry closely at home, take Tylenol and/or ibuprofen as needed for the pain or fever, follow-up with PCP, return if worse. Disposition     FINAL IMPRESSION      1. COVID-19 virus infection    2.  Hypokalemia          DISPOSITION/PLAN   DISPOSITION Decision To Discharge 01/04/2022 08:26:50 PM      PATIENT REFERRED TO:  Audrey Harrington DO  [address] [facility]  [phone]    Call in 2 days  For reevaluation of current symptoms    [facility] [address].  [phone]    If symptoms worsen      DISCHARGE MEDICATIONS:  New Prescriptions    No medications on file             (Please note that portions of this note were completed with a voice recognition program.  Efforts were made to edit the dictations but occasionally words are mis-transcribed.)    Britt Arthur MD,, MD, F.A.C.E.P.   Attending Emergency Medicine Physician                Britt Arthur MD  01/05/22 [id number]

## 2022-01-06 NOTE — CARE COORDINATION
2nd attempt to reach patient. There was no answer. A message was left to have patient call back. Office number left. [phone].

## 2022-01-13 ENCOUNTER — PATIENT MESSAGE (OUTPATIENT)
Dept: FAMILY MEDICINE CLINIC | Age: 42
End: 2022-01-13

## 2022-01-14 ENCOUNTER — VIRTUAL VISIT (OUTPATIENT)
Dept: FAMILY MEDICINE CLINIC | Age: 42
End: 2022-01-14
Payer: COMMERCIAL

## 2022-01-14 DIAGNOSIS — J01.90 ACUTE BACTERIAL SINUSITIS: Primary | ICD-10-CM

## 2022-01-14 DIAGNOSIS — B96.89 ACUTE BACTERIAL SINUSITIS: Primary | ICD-10-CM

## 2022-01-14 PROCEDURE — 99213 OFFICE O/P EST LOW 20 MIN: CPT | Performed by: NURSE PRACTITIONER

## 2022-01-14 RX ORDER — AZITHROMYCIN 250 MG/1
250 TABLET, FILM COATED ORAL SEE ADMIN INSTRUCTIONS
Qty: 6 TABLET | Refills: 0 | Status: SHIPPED | OUTPATIENT
Start: 2022-01-14 | End: 2022-01-19

## 2022-01-14 NOTE — PROGRESS NOTES
Subjective:      Patient ID: Lizz Grider is a 42 y.o. female coming in for   Chief Complaint   Patient presents with    Post-COVID Symptoms     had COVID three weeks ago. went to the ER two weeks ago and got some fluids. still having post nasal drainage and nasal drainage. clearing her throat a lot. Video visit for post covid symptoms    Sinus Problem  This is a new problem. Episode onset: ongoing for three weeks. The problem has been gradually worsening since onset. There has been no fever. Associated symptoms include congestion, coughing (mild), headaches and sinus pressure. Pertinent negatives include no shortness of breath. Past treatments include oral decongestants. The treatment provided mild relief. Review of Systems   Constitutional: Negative for fever. HENT: Positive for congestion, rhinorrhea, sinus pressure and sinus pain. Respiratory: Positive for cough (mild). Negative for shortness of breath. Neurological: Positive for headaches. Objective:   Physical Exam  Constitutional:       General: She is not in acute distress. Appearance: Normal appearance. HENT:      Nose: Congestion present. Pulmonary:      Effort: Pulmonary effort is normal.   Neurological:      General: No focal deficit present. Mental Status: She is alert and oriented to person, place, and time. Assessment:      1. Acute bacterial sinusitis           Plan:    - pt has numerous allergies/intolerants to antbiotics  -I did recommend doxycycline, pt states she does not tolerate that well  -zpack given, may have to repeat, due to does not penetrate sinuses well. No orders of the defined types were placed in this encounter.      Outpatient Encounter Medications as of 1/14/2022   Medication Sig Dispense Refill    azithromycin (ZITHROMAX) 250 MG tablet Take 1 tablet by mouth See Admin Instructions for 5 days 500mg on day 1 followed by 250mg on days 2 - 5 6 tablet 0    norethindrone-ethinyl estradiol (NECON) 0.5-35 MG-MCG per tablet Take 1 tab by mouth daily x 21 days, then skip placebo pills and start new pack. 3 packet 1    blood glucose monitor strips Test 1 time a day & as needed for symptoms of irregular blood glucose. 200 strip 3    Lancets MISC Use to check glucose once daily and as needed for irregular blood glucose. 200 each 3    famotidine (PEPCID) 20 MG tablet Take 1 tablet by mouth nightly 60 tablet 3    dicyclomine (BENTYL) 10 MG capsule Take 1 capsule by mouth 4 times daily as needed (for lower GI symptoms) 120 capsule 3    esomeprazole (NEXIUM) 40 MG delayed release capsule Take 1 capsule by mouth daily 180 capsule 3    loratadine (CLARITIN) 10 MG tablet Take two tablets once daily 180 tablet 3    NONFORMULARY Take 1 capsule by mouth THRIVE capsule, nutritional shake and patch      montelukast (SINGULAIR) 10 MG tablet montelukast 10 mg tablet      fluticasone (FLONASE) 50 MCG/ACT nasal spray 2 sprays to each nostril once daily. 3 Bottle 3    albuterol sulfate  (90 Base) MCG/ACT inhaler Inhale 2 puffs into the lungs 4 times daily as needed for Wheezing 1 Inhaler 0     No facility-administered encounter medications on file as of 1/14/2022.             Jaylen Perez, APRN - CNP

## 2022-01-14 NOTE — TELEPHONE ENCOUNTER
From: Cece Wing  To: Dr. Felisa Araiza: 1/13/2022 6:30 PM EST  Subject: Brenda Stock, i had covid a few weeks ago. Went to er about a week ago got some fluids and potassium. Im back to work but im still junky and coughing up flem and cladding my throat and blowing my nose. Any way to get a zpack to try to push this crap off me? My about is chunky and colored i need a little boost so i can feel better again!

## 2022-01-17 ASSESSMENT — ENCOUNTER SYMPTOMS
SINUS COMPLAINT: 1
RHINORRHEA: 1
SINUS PAIN: 1
SINUS PRESSURE: 1
SHORTNESS OF BREATH: 0
COUGH: 1

## 2022-01-20 ENCOUNTER — OFFICE VISIT (OUTPATIENT)
Dept: SURGERY | Age: 42
End: 2022-01-20
Payer: COMMERCIAL

## 2022-01-20 ENCOUNTER — HOSPITAL ENCOUNTER (OUTPATIENT)
Dept: LAB | Age: 42
Discharge: HOME | End: 2022-01-20
Payer: COMMERCIAL

## 2022-01-20 VITALS
BODY MASS INDEX: 23.04 KG/M2 | SYSTOLIC BLOOD PRESSURE: 130 MMHG | HEART RATE: 96 BPM | TEMPERATURE: 99 F | DIASTOLIC BLOOD PRESSURE: 68 MMHG | HEIGHT: 63 IN | WEIGHT: 130 LBS

## 2022-01-20 DIAGNOSIS — R10.30 LOWER ABDOMINAL PAIN: Primary | ICD-10-CM

## 2022-01-20 DIAGNOSIS — R10.30 LOWER ABDOMINAL PAIN: ICD-10-CM

## 2022-01-20 LAB
ABSOLUTE EOS #: 0.08 K/UL (ref 0–0.44)
ABSOLUTE IMMATURE GRANULOCYTE: <0.03 K/UL (ref 0–0.3)
ABSOLUTE LYMPH #: 1.93 K/UL (ref 1.1–3.7)
ABSOLUTE MONO #: 0.46 K/UL (ref 0.1–1.2)
BASOPHILS # BLD: 0 % (ref 0–2)
BASOPHILS ABSOLUTE: 0.03 K/UL (ref 0–0.2)
DIFFERENTIAL TYPE: ABNORMAL
EOSINOPHILS RELATIVE PERCENT: 1 % (ref 1–4)
HCT VFR BLD CALC: 40.1 % (ref 36.3–47.1)
HEMOGLOBIN: 13.7 G/DL (ref 11.9–15.1)
IMMATURE GRANULOCYTES: 0 %
LYMPHOCYTES # BLD: 27 % (ref 24–43)
MCH RBC QN AUTO: 32.9 PG (ref 25.2–33.5)
MCHC RBC AUTO-ENTMCNC: 34.2 G/DL (ref 25.2–33.5)
MCV RBC AUTO: 96.2 FL (ref 82.6–102.9)
MONOCYTES # BLD: 7 % (ref 3–12)
NRBC AUTOMATED: 0 PER 100 WBC
PDW BLD-RTO: 11.8 % (ref 11.8–14.4)
PLATELET # BLD: 256 K/UL (ref 138–453)
PLATELET ESTIMATE: ABNORMAL
PMV BLD AUTO: 10.8 FL (ref 8.1–13.5)
RBC # BLD: 4.17 M/UL (ref 3.95–5.11)
RBC # BLD: ABNORMAL 10*6/UL
SEG NEUTROPHILS: 65 % (ref 36–65)
SEGMENTED NEUTROPHILS ABSOLUTE COUNT: 4.55 K/UL (ref 1.5–8.1)
WBC # BLD: 7.1 K/UL (ref 3.5–11.3)
WBC # BLD: ABNORMAL 10*3/UL

## 2022-01-20 PROCEDURE — 85025 COMPLETE CBC W/AUTO DIFF WBC: CPT

## 2022-01-20 PROCEDURE — 99214 OFFICE O/P EST MOD 30 MIN: CPT | Performed by: SURGERY

## 2022-01-20 PROCEDURE — 36415 COLL VENOUS BLD VENIPUNCTURE: CPT

## 2022-01-20 RX ORDER — CIPROFLOXACIN 500 MG/1
500 TABLET, FILM COATED ORAL 2 TIMES DAILY
Qty: 14 TABLET | Refills: 0 | Status: SHIPPED | OUTPATIENT
Start: 2022-01-20 | End: 2022-02-16

## 2022-01-20 NOTE — PROGRESS NOTES
General Surgery History & Physical    History of Present Illness:           Abd pain: yes  Anemia: no  Bloating:yes  Diarrhea: no  Constipation: no  Melena: no  Hematochezia:no  Rectal Bleeding:yes, occasional if hard stool  Rectal/Anal Pain:no  Pruritus: no  Family history colon Cancer: no  Previous colon cancer: no  Previous Colon Polyp: no  Change in bowels: no  Decrease caliber of stool: no  Change in color of stool: no    Previous work up date: None

## 2022-01-20 NOTE — PROGRESS NOTES
Vanna Valle is a 42 y.o. female      CC:    Follow up GERD and IBS    HISTORY OF PRESENT ILLNESS:    Pt is 41 yo famale , with hx GERD and IBS. Last EGD with 48 hour bravo was done on 3-28-19. Is currently on nex and pepcid daily and no symtpoms. But IBS or lower abd pressure is bothering her. Last few weeks started , after eating worse,  But is constant last week . Has been about 8/10  Now a 2 out 10. No diarrhea or consitpation. Describe as  Pressure in the mid lower abd , about where her bladder is. Cramping , feels like a balloon is being blown up inside. Sharp pain in the left . Had covid in dec. Was seen in ER 1/4/22, had slightly low K+, 3.5 she wandered if this could be the cause. Has OB/GYn apt and PCP with Dr. Christie Epps in next several weeks. Never had a CS . No fever chills with this symptoms.     Abd pain: yes  Anemia: no  Bloating:yes  Diarrhea: no  Constipation: no  Melena: no  Hematochezia:no  Rectal Bleeding:yes, occasional if hard stool  Rectal/Anal Pain:no  Pruritus: no  Family history colon Cancer: no  Previous colon cancer: no  Previous Colon Polyp: no  Change in bowels: no  Decrease caliber of stool: no  Change in color of stool: no    Previous work up date: None        Review of Systems:    General:  Fever: Negative  Weight Change:Negative  Night Sweats: Negative    Eye:  Blurry Vision:Negative  Double Vision: Negative    Ent:  Headaches: Negative  Sore throat: Negative    Allergy/Immunology:  Hives: Negative  Latex allergy: Negative    Hematology/Lymphatic:  Bleeding Problems: Negative  Blood Clots: Negative  Swollen Lymph Nodes: Negative    Lungs:  Cough: Negative  SOB: Negative  Wheezing:Negative    Cardiovascular:  Chest Pain: Negative  Palpitations:Negative    GI:   Decreased Appetite: Negative  Heartburn: Negative  Dysphagia: Negative  Nausea/Vomiting: Negative  Abdominal Pain: Negative  Change in Bowels:Negative  Constipation: Negative  Diarrhea: Negative  Rectal Bleeding: Negative    :   Dysuria: Negative  Increase Urinary Frequency/Urgency: Negative    Neuro:  Seizures: Negative  Confusion: Negative        PAST MEDICAL HISTORY:      Family History   Problem Relation Age of Onset    High Blood Pressure Mother     Stroke Mother     Thyroid Disease Mother     Diabetes Father     High Blood Pressure Father     Cancer Father         Bladder     Social History     Socioeconomic History    Marital status:      Spouse name: Not on file    Number of children: Not on file    Years of education: Not on file    Highest education level: Not on file   Occupational History    Not on file   Tobacco Use    Smoking status: Smoker, Current Status Unknown     Packs/day: 0.50     Years: 10.00     Pack years: 5.00     Start date: 2012    Smokeless tobacco: Never Used    Tobacco comment: vaping   Vaping Use    Vaping Use: Every day    Substances: Nicotine    Devices: Disposable   Substance and Sexual Activity    Alcohol use: Not Currently     Comment: social    Drug use: No    Sexual activity: Yes     Partners: Male     Birth control/protection: Surgical, Pill     Comment: Tubal and ablation   Other Topics Concern    Not on file   Social History Narrative    Not on file     Social Determinants of Health     Financial Resource Strain: Low Risk     Difficulty of Paying Living Expenses: Not hard at all   Food Insecurity: No Food Insecurity    Worried About Running Out of Food in the Last Year: Never true    Renée of Food in the Last Year: Never true   Transportation Needs:     Lack of Transportation (Medical): Not on file    Lack of Transportation (Non-Medical):  Not on file   Physical Activity:     Days of Exercise per Week: Not on file    Minutes of Exercise per Session: Not on file   Stress:     Feeling of Stress : Not on file   Social Connections:     Frequency of Communication with Friends and Family: Not on file    Frequency of Social Gatherings with Friends and Family: Not on file    Attends Nondenominational Services: Not on file    Active Member of Clubs or Organizations: Not on file    Attends Club or Organization Meetings: Not on file    Marital Status: Not on file   Intimate Partner Violence:     Fear of Current or Ex-Partner: Not on file    Emotionally Abused: Not on file    Physically Abused: Not on file    Sexually Abused: Not on file   Housing Stability:     Unable to Pay for Housing in the Last Year: Not on file    Number of [place] in the Last Year: Not on file    Unstable Housing in the Last Year: Not on file     Past Surgical History:   Procedure Laterality Date    CARPAL TUNNEL RELEASE Left     ENDOMETRIAL ABLATION      LITHOTRIPSY     [address] Right 09/07/2016    laser ureteroscopic lithotripsy    TX EGD TRANSORAL BIOPSY SINGLE/MULTIPLE N/A 3/13/2017    EGD BIOPSY performed by Princess Florentino MD at [address] [place] [address] Bilateral     approximately 2013   [facility] UPPER GASTROINTESTINAL ENDOSCOPY  2010    hiatal hernia     UPPER GASTROINTESTINAL ENDOSCOPY  03/13/2017    mild gastritis, Dr. Zuhair Hinton N/A 3/28/2019    EGD BIOPSY W/48 HR GASCA performed by Princess Florentino MD at [facility] OR.mild inflammation and gastric polyps     Past Medical History:   Diagnosis Date    Allergic rhinitis     GERD (gastroesophageal reflux disease)     Hemorrhoids     Hyperlipidemia     Hypertension     treated in past but lost weight currently on no medication    Hypoglycemia     Kidney stones     Tobacco abuse     UTI (urinary tract infection)     frequency     Current Outpatient Medications on File Prior to Visit   Medication Sig Dispense Refill    norethindrone-ethinyl estradiol (NECON) 0.5-35 MG-MCG per tablet Take 1 tab by mouth daily x 21 days, then skip placebo pills and start new pack.  3 packet 1    blood glucose monitor strips Test 1 time a day & as needed for symptoms of irregular blood glucose. 200 strip 3    Lancets MISC Use to check glucose once daily and as needed for irregular blood glucose. 200 each 3    famotidine (PEPCID) 20 MG tablet Take 1 tablet by mouth nightly 60 tablet 3    dicyclomine (BENTYL) 10 MG capsule Take 1 capsule by mouth 4 times daily as needed (for lower GI symptoms) 120 capsule 3    esomeprazole (NEXIUM) 40 MG delayed release capsule Take 1 capsule by mouth daily 180 capsule 3    loratadine (CLARITIN) 10 MG tablet Take two tablets once daily 180 tablet 3    NONFORMULARY Take 1 capsule by mouth THRIVE capsule, nutritional shake and patch      montelukast (SINGULAIR) 10 MG tablet montelukast 10 mg tablet      fluticasone (FLONASE) 50 MCG/ACT nasal spray 2 sprays to each nostril once daily. 3 Bottle 3    albuterol sulfate  (90 Base) MCG/ACT inhaler Inhale 2 puffs into the lungs 4 times daily as needed for Wheezing 1 Inhaler 0     No current facility-administered medications on file prior to visit. Allergies as of 01/20/2022 - Fully Reviewed 01/14/2022   Allergen Reaction Noted    Latex Dermatitis 02/21/2014    Amoxil [amoxicillin] Diarrhea 09/12/2013    Bactrim [sulfamethoxazole-trimethoprim] Itching 12/23/2013    Clindamycin/lincomycin  02/26/2014    Flagyl [metronidazole] Other (See Comments) 09/12/2013    Biaxin [clarithromycin] Nausea And Vomiting 09/07/2016    Ceftin [cefuroxime axetil] Diarrhea and Nausea And Vomiting 04/24/2017    Keflex [cephalexin] Nausea And Vomiting 12/14/2015         PHYSICAL EXAM:    Blood pressure 130/68, pulse 96, temperature 99 °F (37.2 °C), temperature source Tympanic, height 5' 3\" (1.6 m), weight 130 lb (59 kg), last menstrual period 12/31/2021, not currently breastfeeding.     Gen:  A and O x 3, NAD, well nourished  Eyes:  Sclera non icterus, PERRL  Head:  Normocephalic, non-tender  Neck:  Supple, no adenopathy, thyroid non tender and no masses,no carotid bruits  Lungs: CTA, symmetrical  Chest:  RRR, no murmurs  Abd:  Soft, mild LLQ tenderness. No R or G no masses  Ext:  No edema, no cyanosis  Psych: reveals appropriate mood, memory and judgment,  Neuro:  Reveals no gross motor or sensory deficits,   Msk:  5/5 strength all 4 extremities, no joint tenderness          ASSESS MENT:    1. LLQ pain    Is almost gone. Is a 2 out of 10. It has been there for about 1 to 2 weeks. I told her I think this probably is a mild diverticulitis is most likely. However I think to be aggressive we should consider a CT scan, colonoscopy, and lab work. And then try a 1 to 2-week period of antibiotics. She really did not want to be that aggressive as she wanted to just try a dose of antibiotics. I think that is also 1 approach. We may miss something or have a delayed diagnosis but I think it is reasonable to try to avoid some of the other testing to try a week of antibiotics and at least do a blood work including a CBC and basic metabolic to make sure that her potassium is okay and that she does not have a significant elevation in her white count. If she has continuing issues after the antibiotic. We will try working her up with a CT scan and probable colonoscopy. PLAN:    1.  cipro 500 mg bid x 7 days (allergic to flagyl)  2. Continue prilosec and pepcid for her GERD, she is doing well with that regimen  3. Plan CS screening at 50yo  4. If pain recurs or persists then , would RTC to discuss work up with CT scan and CS.

## 2022-01-25 ENCOUNTER — PATIENT MESSAGE (OUTPATIENT)
Dept: SURGERY | Age: 42
End: 2022-01-25

## 2022-01-25 DIAGNOSIS — R10.32 LLQ PAIN: Primary | ICD-10-CM

## 2022-01-25 NOTE — TELEPHONE ENCOUNTER
From: Hazel Keating  To: Dr. Wilhelm Sniff: 1/25/2022 1:52 PM EST  Subject: CT Scan    Hello, im not feeling better. Can we schedule that CT scan? There's something wrong with me. Rometta Favre Rometta Favre Thanks  Willie Ulrich

## 2022-01-25 NOTE — TELEPHONE ENCOUNTER
Patient sent [email or web address][employer]tvt message stating she is not feeling any better and would like to order the CT scan that was discussed in LOV. LOV with Dr. Annie Mohs on 1/20/2022. Forwarding to Dr. Annie Mohs for approval of CT scan (and to see if he would like CT of abd/pelvis with IV and oral contrast) for LLQ pain. Please advise.

## 2022-01-26 NOTE — TELEPHONE ENCOUNTER
Contacted patient with Dr. Yeimi Duarte recommendations. Labwork and CT scan ordered. Patient will call radiology to schedule due to needing to work around her schedule. CS scheduled for March 1st, and will call if any available slot open before then. Will mail instructions to the patient. Patient verbalized understanding and denies any questions or concerns.

## 2022-01-26 NOTE — TELEPHONE ENCOUNTER
[id number] Shobha Hernández lets do the following. 1.  Cbc, cmp, amylase, lipase  2. CT scan abd/pelvis with IV and PO contrast  3. Then set up for CS   4.   Then follow up OV after 1-3 above

## 2022-01-31 ENCOUNTER — HOSPITAL ENCOUNTER (OUTPATIENT)
Dept: LAB | Age: 42
Discharge: HOME | End: 2022-01-31
Payer: COMMERCIAL

## 2022-01-31 DIAGNOSIS — E11.9 TYPE 2 DIABETES MELLITUS WITHOUT COMPLICATION, WITHOUT LONG-TERM CURRENT USE OF INSULIN (HCC): ICD-10-CM

## 2022-01-31 DIAGNOSIS — Z13.21 ENCOUNTER FOR VITAMIN DEFICIENCY SCREENING: ICD-10-CM

## 2022-01-31 DIAGNOSIS — R10.32 LLQ PAIN: ICD-10-CM

## 2022-01-31 DIAGNOSIS — E78.5 HYPERLIPIDEMIA, UNSPECIFIED HYPERLIPIDEMIA TYPE: ICD-10-CM

## 2022-01-31 DIAGNOSIS — Z13.29 SCREENING FOR THYROID DISORDER: ICD-10-CM

## 2022-01-31 LAB
ABSOLUTE EOS #: 0.21 K/UL (ref 0–0.44)
ABSOLUTE IMMATURE GRANULOCYTE: 0.03 K/UL (ref 0–0.3)
ABSOLUTE LYMPH #: 3.26 K/UL (ref 1.1–3.7)
ABSOLUTE MONO #: 0.55 K/UL (ref 0.1–1.2)
ALBUMIN SERPL-MCNC: 4.5 G/DL (ref 3.5–5.2)
ALBUMIN SERPL-MCNC: 4.5 G/DL (ref 3.5–5.2)
ALBUMIN/GLOBULIN RATIO: 1.7 (ref 1–2.5)
ALBUMIN/GLOBULIN RATIO: 1.7 (ref 1–2.5)
ALP BLD-CCNC: 44 U/L (ref 35–104)
ALP BLD-CCNC: 44 U/L (ref 35–104)
ALT SERPL-CCNC: 22 U/L (ref 5–33)
ALT SERPL-CCNC: 22 U/L (ref 5–33)
AMYLASE: 52 U/L (ref 28–100)
ANION GAP SERPL CALCULATED.3IONS-SCNC: 11 MMOL/L (ref 9–17)
ANION GAP SERPL CALCULATED.3IONS-SCNC: 11 MMOL/L (ref 9–17)
AST SERPL-CCNC: 18 U/L
AST SERPL-CCNC: 18 U/L
BASOPHILS # BLD: 1 % (ref 0–2)
BASOPHILS ABSOLUTE: 0.06 K/UL (ref 0–0.2)
BILIRUB SERPL-MCNC: 0.53 MG/DL (ref 0.3–1.2)
BILIRUB SERPL-MCNC: 0.53 MG/DL (ref 0.3–1.2)
BUN BLDV-MCNC: 11 MG/DL (ref 6–20)
BUN BLDV-MCNC: 11 MG/DL (ref 6–20)
BUN/CREAT BLD: 17 (ref 9–20)
BUN/CREAT BLD: 17 (ref 9–20)
CALCIUM SERPL-MCNC: 9.2 MG/DL (ref 8.6–10.4)
CALCIUM SERPL-MCNC: 9.2 MG/DL (ref 8.6–10.4)
CHLORIDE BLD-SCNC: 102 MMOL/L (ref 98–107)
CHLORIDE BLD-SCNC: 102 MMOL/L (ref 98–107)
CHOLESTEROL/HDL RATIO: 4.3
CHOLESTEROL: 229 MG/DL
CO2: 26 MMOL/L (ref 20–31)
CO2: 26 MMOL/L (ref 20–31)
CREAT SERPL-MCNC: 0.66 MG/DL (ref 0.5–0.9)
CREAT SERPL-MCNC: 0.66 MG/DL (ref 0.5–0.9)
CREATININE URINE: 195.7 MG/DL (ref 28–217)
DIFFERENTIAL TYPE: ABNORMAL
EOSINOPHILS RELATIVE PERCENT: 3 % (ref 1–4)
ESTIMATED AVERAGE GLUCOSE: 169 MG/DL
GFR AFRICAN AMERICAN: >60 ML/MIN
GFR AFRICAN AMERICAN: >60 ML/MIN
GFR NON-AFRICAN AMERICAN: >60 ML/MIN
GFR NON-AFRICAN AMERICAN: >60 ML/MIN
GFR SERPL CREATININE-BSD FRML MDRD: ABNORMAL ML/MIN/{1.73_M2}
GLUCOSE BLD-MCNC: 153 MG/DL (ref 70–99)
GLUCOSE BLD-MCNC: 153 MG/DL (ref 70–99)
HBA1C MFR BLD: 7.5 % (ref 4–6)
HCT VFR BLD CALC: 38.6 % (ref 36.3–47.1)
HDLC SERPL-MCNC: 53 MG/DL
HEMOGLOBIN: 13.3 G/DL (ref 11.9–15.1)
IMMATURE GRANULOCYTES: 0 %
LDL CHOLESTEROL: 141 MG/DL (ref 0–130)
LIPASE: 31 U/L (ref 13–60)
LYMPHOCYTES # BLD: 38 % (ref 24–43)
MCH RBC QN AUTO: 33.1 PG (ref 25.2–33.5)
MCHC RBC AUTO-ENTMCNC: 34.5 G/DL (ref 25.2–33.5)
MCV RBC AUTO: 96 FL (ref 82.6–102.9)
MICROALBUMIN/CREAT 24H UR: 38 MG/L
MICROALBUMIN/CREAT UR-RTO: 19 MCG/MG CREAT
MONOCYTES # BLD: 6 % (ref 3–12)
NRBC AUTOMATED: 0 PER 100 WBC
PDW BLD-RTO: 12.3 % (ref 11.8–14.4)
PLATELET # BLD: 196 K/UL (ref 138–453)
PLATELET ESTIMATE: ABNORMAL
PMV BLD AUTO: 10.4 FL (ref 8.1–13.5)
POTASSIUM SERPL-SCNC: 3.9 MMOL/L (ref 3.7–5.3)
POTASSIUM SERPL-SCNC: 3.9 MMOL/L (ref 3.7–5.3)
RBC # BLD: 4.02 M/UL (ref 3.95–5.11)
RBC # BLD: ABNORMAL 10*6/UL
SEG NEUTROPHILS: 52 % (ref 36–65)
SEGMENTED NEUTROPHILS ABSOLUTE COUNT: 4.45 K/UL (ref 1.5–8.1)
SODIUM BLD-SCNC: 139 MMOL/L (ref 135–144)
SODIUM BLD-SCNC: 139 MMOL/L (ref 135–144)
TOTAL PROTEIN: 7.2 G/DL (ref 6.4–8.3)
TOTAL PROTEIN: 7.2 G/DL (ref 6.4–8.3)
TRIGL SERPL-MCNC: 174 MG/DL
TSH SERPL DL<=0.05 MIU/L-ACNC: 2.39 MIU/L (ref 0.3–5)
VITAMIN D 25-HYDROXY: 55.9 NG/ML (ref 30–100)
VLDLC SERPL CALC-MCNC: ABNORMAL MG/DL (ref 1–30)
WBC # BLD: 8.6 K/UL (ref 3.5–11.3)
WBC # BLD: ABNORMAL 10*3/UL

## 2022-01-31 PROCEDURE — 82043 UR ALBUMIN QUANTITATIVE: CPT

## 2022-01-31 PROCEDURE — 82150 ASSAY OF AMYLASE: CPT

## 2022-01-31 PROCEDURE — 80061 LIPID PANEL: CPT

## 2022-01-31 PROCEDURE — 82570 ASSAY OF URINE CREATININE: CPT

## 2022-01-31 PROCEDURE — 82306 VITAMIN D 25 HYDROXY: CPT

## 2022-01-31 PROCEDURE — 83036 HEMOGLOBIN GLYCOSYLATED A1C: CPT

## 2022-01-31 PROCEDURE — 84443 ASSAY THYROID STIM HORMONE: CPT

## 2022-01-31 PROCEDURE — 83690 ASSAY OF LIPASE: CPT

## 2022-01-31 PROCEDURE — 85025 COMPLETE CBC W/AUTO DIFF WBC: CPT

## 2022-01-31 PROCEDURE — 36415 COLL VENOUS BLD VENIPUNCTURE: CPT

## 2022-01-31 PROCEDURE — 80053 COMPREHEN METABOLIC PANEL: CPT

## 2022-02-01 ENCOUNTER — HOSPITAL ENCOUNTER (OUTPATIENT)
Dept: CT IMAGING | Age: 42
Discharge: HOME | End: 2022-02-03
Payer: COMMERCIAL

## 2022-02-01 DIAGNOSIS — R10.32 LLQ PAIN: ICD-10-CM

## 2022-02-01 PROCEDURE — 6360000004 HC RX CONTRAST MEDICATION: Performed by: SURGERY

## 2022-02-01 PROCEDURE — 2709999900 CT ABDOMEN PELVIS W IV CONTRAST

## 2022-02-01 RX ADMIN — IOHEXOL 50 ML: 240 INJECTION, SOLUTION INTRATHECAL; INTRAVASCULAR; INTRAVENOUS; ORAL at 08:15

## 2022-02-01 RX ADMIN — IOPAMIDOL 100 ML: 755 INJECTION, SOLUTION INTRAVENOUS at 09:25

## 2022-02-02 ENCOUNTER — TELEPHONE (OUTPATIENT)
Dept: SURGERY | Age: 42
End: 2022-02-02

## 2022-02-02 NOTE — TELEPHONE ENCOUNTER
I spoke with patient and gave her Dr. Juan Luis Ahn recommendations. She states that she saw OB-GYN in [facility] yesterday and they have her scheduled for pelvic ultrasound.

## 2022-02-02 NOTE — TELEPHONE ENCOUNTER
Patient saw her CT abdomen pelvis results in my chart and called to review these. Please call her back at [phone].

## 2022-02-02 NOTE — TELEPHONE ENCOUNTER
The CT looks good  No acute process or diverticulitis  The uterus looks ok to me but radiology thought it was a little thicker then normal or denser. So I would do US as suggested and have OB-GYN evalute the finding. So would do :  1.  US pelvis per CT recommendations  2.   referal to Ob-Gyn

## 2022-02-02 NOTE — TELEPHONE ENCOUNTER
Contacted patient and reviewed results. I explained that Dr. Layo Jackson has not reviewed these results yet. Patient states she is still have LLQ pain constantly and some days are worse then others. She has some questions regarding her CT scan. Wanting to know if her kidney stones are still in the kidney or if they are in the ureters? And also what Heterogeneous hypodense appearance of the uterine parenchyma which is nonspecific? I explained on the results of the CT it does not show that the kidney stones are in th ureters, but I can have Dr. Layo Jackson review the imaging. I also explained and apologized that I do not know the heterogeneous hypodense appearance of the uterine parenchyma is and unsure if there are to be concerns with this. I explained I will send a message to Dr. Layo Jackson regarding her results and what his recommendations are. She verbalized understanding.

## 2022-02-07 ENCOUNTER — TELEPHONE (OUTPATIENT)
Dept: SURGERY | Age: 42
End: 2022-02-07

## 2022-02-07 NOTE — TELEPHONE ENCOUNTER
I called patient back. She is wanting to see Urology for kidney stones. I transferred her to reception to schedule.

## 2022-02-11 DIAGNOSIS — N92.0 MENORRHAGIA WITH REGULAR CYCLE: ICD-10-CM

## 2022-02-11 NOTE — TELEPHONE ENCOUNTER
Call to pharm, states pt picked up last refill on 1/25, will need new rx.      Emily called requesting a refill of the below medication which has been pended for you:     Requested Prescriptions     Pending Prescriptions Disp Refills    norethindrone-ethinyl estradiol (MARGI) 0.5-35 MG-MCG per tablet [Pharmacy Med Name: MARGI 0.5/0.035 MG 28 TABLET] 84 tablet      Sig: take 1 tablet by mouth daily for 21 DAYS SKIP TH E PLACEBO TABLETS AND START NEW PACK       Last Appointment Date: 10/15/2021  Next Appointment Date: 3/15/2022    Allergies   Allergen Reactions    Latex Dermatitis    Amoxil [Amoxicillin] Diarrhea    Bactrim [Sulfamethoxazole-Trimethoprim] Itching    Clindamycin/Lincomycin      Nausea,vomiting    Flagyl [Metronidazole] Other (See Comments)     Sees bugs crawling    Biaxin [Clarithromycin] Nausea And Vomiting    Ceftin [Cefuroxime Axetil] Diarrhea and Nausea And Vomiting    Keflex [Cephalexin] Nausea And Vomiting

## 2022-02-12 RX ORDER — NORETHINDRONE AND ETHINYL ESTRADIOL 0.5-0.035
KIT ORAL
Qty: 84 TABLET | Refills: 0 | Status: SHIPPED | OUTPATIENT
Start: 2022-02-12 | End: 2022-06-17 | Stop reason: ALTCHOICE

## 2022-02-16 ENCOUNTER — OFFICE VISIT (OUTPATIENT)
Dept: PRIMARY CARE CLINIC | Age: 42
End: 2022-02-16
Payer: COMMERCIAL

## 2022-02-16 ENCOUNTER — OFFICE VISIT (OUTPATIENT)
Dept: UROLOGY | Age: 42
End: 2022-02-16
Payer: COMMERCIAL

## 2022-02-16 VITALS
SYSTOLIC BLOOD PRESSURE: 132 MMHG | DIASTOLIC BLOOD PRESSURE: 70 MMHG | BODY MASS INDEX: 23.63 KG/M2 | HEART RATE: 86 BPM | WEIGHT: 133.4 LBS | OXYGEN SATURATION: 97 %

## 2022-02-16 VITALS
DIASTOLIC BLOOD PRESSURE: 78 MMHG | SYSTOLIC BLOOD PRESSURE: 118 MMHG | HEIGHT: 63 IN | TEMPERATURE: 97.4 F | WEIGHT: 133.5 LBS | OXYGEN SATURATION: 98 % | RESPIRATION RATE: 18 BRPM | HEART RATE: 87 BPM | BODY MASS INDEX: 23.65 KG/M2

## 2022-02-16 DIAGNOSIS — N20.0 NEPHROLITHIASIS: Primary | ICD-10-CM

## 2022-02-16 DIAGNOSIS — H69.82 EUSTACHIAN TUBE DYSFUNCTION, LEFT: Primary | ICD-10-CM

## 2022-02-16 DIAGNOSIS — N28.1 RENAL CYST: ICD-10-CM

## 2022-02-16 DIAGNOSIS — N39.0 RECURRENT UTI: ICD-10-CM

## 2022-02-16 PROCEDURE — 99204 OFFICE O/P NEW MOD 45 MIN: CPT | Performed by: UROLOGY

## 2022-02-16 PROCEDURE — 99213 OFFICE O/P EST LOW 20 MIN: CPT | Performed by: NURSE PRACTITIONER

## 2022-02-16 RX ORDER — PREDNISONE 20 MG/1
40 TABLET ORAL DAILY
Qty: 10 TABLET | Refills: 0 | Status: SHIPPED | OUTPATIENT
Start: 2022-02-16 | End: 2022-02-21

## 2022-02-16 ASSESSMENT — PATIENT HEALTH QUESTIONNAIRE - PHQ9
SUM OF ALL RESPONSES TO PHQ9 QUESTIONS 1 & 2: 0
SUM OF ALL RESPONSES TO PHQ QUESTIONS 1-9: 0
2. FEELING DOWN, DEPRESSED OR HOPELESS: 0
1. LITTLE INTEREST OR PLEASURE IN DOING THINGS: 0

## 2022-02-16 ASSESSMENT — ENCOUNTER SYMPTOMS
COUGH: 0
SORE THROAT: 0
DIARRHEA: 0
RHINORRHEA: 0
ABDOMINAL PAIN: 0

## 2022-02-16 NOTE — PATIENT INSTRUCTIONS
Patient Education        Eustachian Tube Problems: Care Instructions  Your Care Instructions     The eustachian (say \"you-STAY-shee-un\") tubes run between the inside of the ears and the throat. They keep air pressure stable in the ears. If your eustachian tubes become blocked, the air pressure in your ears changes. The fluids from a cold can clog eustachian tubes, causing pain in the ears. A quick change in air pressure can cause eustachian tubes to close up. This might happen when an airplane changes altitude or when a  goes up or down underwater. Eustachian tube problems often clear up on their own or after antibiotic treatment. If your tubes continue to be blocked, you may need surgery. Follow-up care is a key part of your treatment and safety. Be sure to make and go to all appointments, and call your doctor if you are having problems. It's also a good idea to know your test results and keep a list of the medicines you take. How can you care for yourself at home? · To ease ear pain, apply a warm washcloth or a heating pad set on low. There may be some drainage from the ear when the heat melts earwax. Put a cloth between the heat source and your skin. · If your doctor prescribed antibiotics, take them as directed. Do not stop taking them just because you feel better. You need to take the full course of antibiotics. · Your doctor may recommend over-the-counter medicine. Be safe with medicines. Oral or nasal decongestants may relieve ear pain. Avoid decongestants that are combined with antihistamines, which tend to cause more blockage. But if allergies seem to be the problem, your doctor may recommend a combination. Be careful with cough and cold medicines. Don't give them to children younger than 6, because they don't work for children that age and can even be harmful. For children 6 and older, always follow all the instructions carefully.  Make sure you know how much medicine to give and how long to use it. And use the dosing device if one is included. When should you call for help? Call your doctor now or seek immediate medical care if:    · You develop sudden, complete hearing loss.     · You have severe pain or feel dizzy.     · You have new or increasing pus or blood draining from your ear.     · You have redness, swelling, or pain around or behind the ear. Watch closely for changes in your health, and be sure to contact your doctor if:    · You do not get better after 2 weeks.     · You have any new symptoms, such as itching or a feeling of fullness in the ear. Where can you learn more? Go to https://[employer]. org and sign in to your [employer] account. Enter Y822 in the [employer] box to learn more about \"Eustachian Tube Problems: Care Instructions. \"     If you do not have an account, please click on the \"Sign Up Now\" link. Current as of: September 8, 2021               Content Version: 13.1  © 2006-2021 Healthwise, Incorporated. Care instructions adapted under license by [facility] ([facility]). If you have questions about a medical condition or this instruction, always ask your healthcare professional. [place] [address] any warranty or liability for your use of this information.

## 2022-02-16 NOTE — PROGRESS NOTES
[facility]             [address], [address]                        Telephone [phone]             Fax [phone]     [address] [place] [address] Keanu  1980  KJCLARA:[id number]   Date of visit:  2/16/2022    Subjective:    [facility] Keanu is a 36 y.o.  female who presents to [facility] today (2/16/2022) for evaluation of:    Chief Complaint   Patient presents with    Otalgia     Left, x10 days, can hear \"squishing,\" pain extends slightly into left upper jaw. Otalgia   There is pain in the left ear. This is a new problem. The current episode started 1 to 4 weeks ago (X 10 days). The problem occurs constantly. The problem has been gradually worsening. There has been no fever. The pain is at a severity of 8/10. Pertinent negatives include no abdominal pain, coughing, diarrhea, ear discharge, headaches, rash, rhinorrhea or sore throat. She has tried acetaminophen (sudafed, flonase, ibuprofen, claritin) for the symptoms. The treatment provided no relief. She has the following problem list:  Patient Active Problem List   Diagnosis    Hyperlipidemia    Tobacco use    Vertigo, benign paroxysmal    Type 2 diabetes mellitus without complication, without long-term current use of insulin ([facility])    LPRD (laryngopharyngeal reflux disease)    Seasonal allergic rhinitis due to pollen    Sinus problem        Current medications are:  Current Outpatient Medications   Medication Sig Dispense Refill    predniSONE (DELTASONE) 20 MG tablet Take 2 tablets by mouth daily for 5 days 10 tablet 0    norethindrone-ethinyl estradiol (MARGI) 0.5-35 MG-MCG per tablet take 1 tablet by mouth daily for 21 DAYS SKIP TH E PLACEBO TABLETS AND START NEW PACK 84 tablet 0    blood glucose monitor strips Test 1 time a day & as needed for symptoms of irregular blood glucose.  200 strip 3    Lancets MISC Use to check glucose once daily and as needed for irregular blood glucose. 200 each 3    famotidine (PEPCID) 20 MG tablet Take 1 tablet by mouth nightly 60 tablet 3    dicyclomine (BENTYL) 10 MG capsule Take 1 capsule by mouth 4 times daily as needed (for lower GI symptoms) 120 capsule 3    esomeprazole (NEXIUM) 40 MG delayed release capsule Take 1 capsule by mouth daily 180 capsule 3    loratadine (CLARITIN) 10 MG tablet Take two tablets once daily 180 tablet 3    NONFORMULARY Take 1 capsule by mouth THRIVE capsule, nutritional shake and patch      montelukast (SINGULAIR) 10 MG tablet montelukast 10 mg tablet      fluticasone (FLONASE) 50 MCG/ACT nasal spray 2 sprays to each nostril once daily. 3 Bottle 3    albuterol sulfate  (90 Base) MCG/ACT inhaler Inhale 2 puffs into the lungs 4 times daily as needed for Wheezing 1 Inhaler 0     No current facility-administered medications for this visit. She is allergic to latex, amoxil [amoxicillin], bactrim [sulfamethoxazole-trimethoprim], clindamycin/lincomycin, flagyl [metronidazole], biaxin [clarithromycin], ceftin [cefuroxime axetil], and keflex [cephalexin]. .    She  reports that she has been smoking. She started smoking about 10 years ago. She has a 5.00 pack-year smoking history. She has never used smokeless tobacco.      Objective:    Vitals:    02/16/22 1302   BP: 118/78   Site: Left Upper Arm   Position: Sitting   Cuff Size: Medium Adult   Pulse: 87   Resp: 18   Temp: 97.4 °F (36.3 °C)   TempSrc: Tympanic   SpO2: 98%   Weight: 133 lb 8 oz (60.6 kg)   Height: 5' 3\" (1.6 m)     Body mass index is 23.65 kg/m². Review of Systems   HENT: Positive for ear pain. Negative for ear discharge, rhinorrhea and sore throat. Respiratory: Negative for cough. Gastrointestinal: Negative for abdominal pain and diarrhea. Skin: Negative for rash. Neurological: Negative for headaches. Physical Exam  Vitals and nursing note reviewed.    Constitutional:       Appearance: She is well-developed. HENT:      Head: Normocephalic. Jaw: There is normal jaw occlusion. Right Ear: Tympanic membrane, ear canal and external ear normal.      Left Ear: Ear canal and external ear normal. A middle ear effusion is present. Tympanic membrane is bulging. Nose: Nose normal.      Mouth/Throat:      Lips: Pink. Mouth: Mucous membranes are moist.      Pharynx: Oropharynx is clear. Uvula midline. Eyes:      Pupils: Pupils are equal, round, and reactive to light. Cardiovascular:      Rate and Rhythm: Normal rate and regular rhythm. Heart sounds: Normal heart sounds. Pulmonary:      Effort: Pulmonary effort is normal.      Breath sounds: Normal breath sounds and air entry. Musculoskeletal:      Cervical back: Normal range of motion and neck supple. Lymphadenopathy:      Cervical: No cervical adenopathy. Skin:     General: Skin is warm and dry. Neurological:      General: No focal deficit present. Mental Status: She is alert and oriented to person, place, and time. Psychiatric:         Behavior: Behavior normal.         Thought Content: Thought content normal.       Assessment and Plan:    No results found for this visit on 02/16/22. Diagnosis Orders   1. Eustachian tube dysfunction, left  predniSONE (DELTASONE) 20 MG tablet     Take prednisone as directed. Warm compress to area 2-3 times daily for 15 minutes. Take Tylenol or ibuprofen as needed for pain/fever. I recommended chewing gum to promote eustachian tube function. Follow up with PCP if symptoms persist or worsen. The use, risks, benefits, and side effects of prescribed or recommended medications were discussed. All questions were answered and the patient/caregiver voiced understanding. No orders of the defined types were placed in this encounter.         Electronically signed by DEAN Espinoza CNP on 2/16/22 at 1:24 PM EST

## 2022-02-16 NOTE — PROGRESS NOTES
Nura Woodard MD  Urology Clinic office visit  NEW PATIENT    Patient:  Magi Lamar  YOB: 1980  Date: 2/16/2022    HISTORY OF PRESENT ILLNESS:   The patient is a 36 y.o. female who presents today for evaluation of the following problems:      1. Nephrolithiasis    2. Renal cyst    3. Recurrent UTI         Overall the problem(s) : are worsening. Associated Symptoms: No dysuria, gross hematuria. Pain Severity:      Summary of old records: N/A  (Patient's old records, notes and chart reviewed and summarized above.)      Onset 1 month  Lower abdominal cramping  Left sided intermittent sharp pains  No hematuria, UTIs  Severity is described as moderate. The course of symptoms over time is insidious. Reports possible UTIs in the past few months  11/2021 shows infection  relieved with Abx  Reports sometimes dysuria  No UUI or FARHAD  Denies bulge or prolapse     6 mm likely simple cyst lower pole left kidney. I independently reviewed and verified the images and reports from:    CT ABDOMEN PELVIS W IV CONTRAST Additional Contrast? Oral    Result Date: 2/1/2022  EXAMINATION: CT OF THE ABDOMEN AND PELVIS WITH CONTRAST 2/1/2022 8:41 am TECHNIQUE: CT of the abdomen and pelvis was performed with the administration of intravenous contrast. Multiplanar reformatted images are provided for review. Dose modulation, iterative reconstruction, and/or weight based adjustment of the mA/kV was utilized to reduce the radiation dose to as low as reasonably achievable. COMPARISON: 06/15/2018 HISTORY: ORDERING SYSTEM PROVIDED HISTORY: LLQ pain FINDINGS: Lower Chest: Normal heart size. Lung bases clear. Organs: The liver, spleen, pancreas, adrenal glands and gallbladder appear unremarkable. Bilateral nonobstructive calculi. 6 mm likely simple cyst lower pole left kidney. GI/Bowel: No evidence of bowel obstruction or inflammation. Normal appendix. Pelvis: Bladder is unremarkable.   Heterogeneous hypodense appearance of the uterine parenchyma. Bilateral tubal ligation. Peritoneum/Retroperitoneum: Normal caliber abdominal aorta. No suspicious lymphadenopathy. No ascites or free air. Bones/Soft Tissues: No significant osseous abnormality. 1. No evidence of acute process within the abdomen or pelvis. 2. Redemonstration of bilateral nephrolithiasis. 3. Heterogeneous hypodense appearance of the uterine parenchyma which is nonspecific, consider further evaluation with pelvic ultrasound as clinically warranted. Urinalysis today:  No results found for this visit on 02/16/22. Last BUN and creatinine:  Lab Results   Component Value Date    BUN 11 01/31/2022     Lab Results   Component Value Date    CREATININE 0.66 01/31/2022       Imaging Reviewed during this Office Visit:   (results were independently reviewed by physician and radiology report verified)  I independently reviewed and verified the images and reports from:    CT ABDOMEN PELVIS W IV CONTRAST Additional Contrast? Oral    Result Date: 2/1/2022  EXAMINATION: CT OF THE ABDOMEN AND PELVIS WITH CONTRAST 2/1/2022 8:41 am TECHNIQUE: CT of the abdomen and pelvis was performed with the administration of intravenous contrast. Multiplanar reformatted images are provided for review. Dose modulation, iterative reconstruction, and/or weight based adjustment of the mA/kV was utilized to reduce the radiation dose to as low as reasonably achievable. COMPARISON: 06/15/2018 HISTORY: ORDERING SYSTEM PROVIDED HISTORY: LLQ pain FINDINGS: Lower Chest: Normal heart size. Lung bases clear. Organs: The liver, spleen, pancreas, adrenal glands and gallbladder appear unremarkable. Bilateral nonobstructive calculi. 6 mm likely simple cyst lower pole left kidney. GI/Bowel: No evidence of bowel obstruction or inflammation. Normal appendix. Pelvis: Bladder is unremarkable. Heterogeneous hypodense appearance of the uterine parenchyma. Bilateral tubal ligation.  Peritoneum/Retroperitoneum: Normal caliber abdominal aorta. No suspicious lymphadenopathy. No ascites or free air. Bones/Soft Tissues: No significant osseous abnormality. 1. No evidence of acute process within the abdomen or pelvis. 2. Redemonstration of bilateral nephrolithiasis. 3. Heterogeneous hypodense appearance of the uterine parenchyma which is nonspecific, consider further evaluation with pelvic ultrasound as clinically warranted.          PAST MEDICAL, FAMILY AND SOCIAL HISTORY:  Past Medical History:   Diagnosis Date    Allergic rhinitis     GERD (gastroesophageal reflux disease)     Hemorrhoids     Hyperlipidemia     Hypertension     treated in past but lost weight currently on no medication    Hypoglycemia     Kidney stones     Tobacco abuse     UTI (urinary tract infection)     frequency     Past Surgical History:   Procedure Laterality Date    CARPAL TUNNEL RELEASE Left     ENDOMETRIAL ABLATION      LITHOTRIPSY      NASAL SEPTUM SURGERY      OTHER SURGICAL HISTORY Right 09/07/2016    laser ureteroscopic lithotripsy    [place] EGD TRANSORAL BIOPSY SINGLE/MULTIPLE N/A 3/13/2017    EGD BIOPSY performed by Idalia Aparicio MD at [facility] Bilateral     approximately 2013    TUBAL LIGATION      UPPER GASTROINTESTINAL ENDOSCOPY  2010    hiatal hernia     UPPER GASTROINTESTINAL ENDOSCOPY  03/13/2017    mild gastritis, Dr. Faith Alexander N/A 3/28/2019    EGD BIOPSY W/48 HR GASCA performed by Idalia Aparicio MD at [address] [facility].mild inflammation and gastric polyps     Family History   Problem Relation Age of Onset    High Blood Pressure Mother     Stroke Mother     Thyroid Disease Mother     Diabetes Father     High Blood Pressure Father     Cancer Father         Bladder     No outpatient medications have been marked as taking for the 2/16/22 encounter (Office Visit) with Chiquita Shen MD.       Latex, Amoxil [amoxicillin], Bactrim [sulfamethoxazole-trimethoprim],

## 2022-02-17 ENCOUNTER — HOSPITAL ENCOUNTER (EMERGENCY)
Age: 42
Discharge: HOME | End: 2022-02-17
Attending: EMERGENCY MEDICINE
Payer: COMMERCIAL

## 2022-02-17 VITALS
RESPIRATION RATE: 18 BRPM | DIASTOLIC BLOOD PRESSURE: 89 MMHG | HEIGHT: 63 IN | HEART RATE: 114 BPM | TEMPERATURE: 99.1 F | WEIGHT: 132 LBS | BODY MASS INDEX: 23.39 KG/M2 | SYSTOLIC BLOOD PRESSURE: 142 MMHG | OXYGEN SATURATION: 97 %

## 2022-02-17 DIAGNOSIS — R10.30 LOWER ABDOMINAL PAIN: Primary | ICD-10-CM

## 2022-02-17 LAB
-: ABNORMAL
ABSOLUTE EOS #: 0.03 K/UL (ref 0–0.44)
ABSOLUTE IMMATURE GRANULOCYTE: 0.03 K/UL (ref 0–0.3)
ABSOLUTE LYMPH #: 2.16 K/UL (ref 1.1–3.7)
ABSOLUTE MONO #: 0.38 K/UL (ref 0.1–1.2)
ALBUMIN SERPL-MCNC: 5.2 G/DL (ref 3.5–5.2)
ALBUMIN/GLOBULIN RATIO: 1.7 (ref 1–2.5)
ALP BLD-CCNC: 46 U/L (ref 35–104)
ALT SERPL-CCNC: 31 U/L (ref 5–33)
AMORPHOUS: ABNORMAL
ANION GAP SERPL CALCULATED.3IONS-SCNC: 13 MMOL/L (ref 9–17)
AST SERPL-CCNC: 24 U/L
BACTERIA: ABNORMAL
BASOPHILS # BLD: 1 % (ref 0–2)
BASOPHILS ABSOLUTE: 0.06 K/UL (ref 0–0.2)
BILIRUB SERPL-MCNC: 0.46 MG/DL (ref 0.3–1.2)
BILIRUBIN DIRECT: 0.1 MG/DL
BILIRUBIN URINE: NEGATIVE
BILIRUBIN, INDIRECT: 0.36 MG/DL (ref 0–1)
BUN BLDV-MCNC: 10 MG/DL (ref 6–20)
BUN/CREAT BLD: 18 (ref 9–20)
CALCIUM SERPL-MCNC: 9.5 MG/DL (ref 8.6–10.4)
CASTS UA: ABNORMAL /LPF (ref 0–2)
CHLORIDE BLD-SCNC: 96 MMOL/L (ref 98–107)
CO2: 26 MMOL/L (ref 20–31)
COLOR: ABNORMAL
COMMENT UA: ABNORMAL
CREAT SERPL-MCNC: 0.57 MG/DL (ref 0.5–0.9)
CRYSTALS, UA: ABNORMAL /HPF
DIFFERENTIAL TYPE: ABNORMAL
EOSINOPHILS RELATIVE PERCENT: 0 % (ref 1–4)
EPITHELIAL CELLS UA: ABNORMAL /HPF (ref 0–5)
GFR AFRICAN AMERICAN: >60 ML/MIN
GFR NON-AFRICAN AMERICAN: >60 ML/MIN
GFR SERPL CREATININE-BSD FRML MDRD: ABNORMAL ML/MIN/{1.73_M2}
GFR SERPL CREATININE-BSD FRML MDRD: ABNORMAL ML/MIN/{1.73_M2}
GLOBULIN: 3.1 G/DL (ref 1.5–3.8)
GLUCOSE BLD-MCNC: 178 MG/DL (ref 70–99)
GLUCOSE URINE: ABNORMAL
HCG(URINE) PREGNANCY TEST: NEGATIVE
HCT VFR BLD CALC: 42.3 % (ref 36.3–47.1)
HEMOGLOBIN: 14.8 G/DL (ref 11.9–15.1)
IMMATURE GRANULOCYTES: 0 %
KETONES, URINE: NEGATIVE
LEUKOCYTE ESTERASE, URINE: ABNORMAL
LIPASE: 24 U/L (ref 13–60)
LYMPHOCYTES # BLD: 25 % (ref 24–43)
MAGNESIUM: 1.9 MG/DL (ref 1.6–2.6)
MCH RBC QN AUTO: 33.6 PG (ref 25.2–33.5)
MCHC RBC AUTO-ENTMCNC: 35 G/DL (ref 25.2–33.5)
MCV RBC AUTO: 96.1 FL (ref 82.6–102.9)
MONOCYTES # BLD: 5 % (ref 3–12)
MUCUS: ABNORMAL
NITRITE, URINE: POSITIVE
NRBC AUTOMATED: 0 PER 100 WBC
OTHER OBSERVATIONS UA: ABNORMAL
PDW BLD-RTO: 12.5 % (ref 11.8–14.4)
PH UA: 7 (ref 5–6)
PLATELET # BLD: 240 K/UL (ref 138–453)
PLATELET ESTIMATE: ABNORMAL
PMV BLD AUTO: 10.6 FL (ref 8.1–13.5)
POTASSIUM SERPL-SCNC: 3.4 MMOL/L (ref 3.7–5.3)
PROTEIN UA: ABNORMAL
RBC # BLD: 4.4 M/UL (ref 3.95–5.11)
RBC # BLD: ABNORMAL 10*6/UL
RBC UA: ABNORMAL /HPF (ref 0–4)
RENAL EPITHELIAL, UA: ABNORMAL /HPF
SEG NEUTROPHILS: 69 % (ref 36–65)
SEGMENTED NEUTROPHILS ABSOLUTE COUNT: 5.86 K/UL (ref 1.5–8.1)
SODIUM BLD-SCNC: 135 MMOL/L (ref 135–144)
SPECIFIC GRAVITY UA: 1.01 (ref 1.01–1.02)
TOTAL PROTEIN: 8.3 G/DL (ref 6.4–8.3)
TRICHOMONAS: ABNORMAL
TURBIDITY: ABNORMAL
URINE HGB: NEGATIVE
UROBILINOGEN, URINE: NORMAL
WBC # BLD: 8.5 K/UL (ref 3.5–11.3)
WBC # BLD: ABNORMAL 10*3/UL
WBC UA: ABNORMAL /HPF (ref 0–4)
YEAST: ABNORMAL

## 2022-02-17 PROCEDURE — 81001 URINALYSIS AUTO W/SCOPE: CPT

## 2022-02-17 PROCEDURE — 80048 BASIC METABOLIC PNL TOTAL CA: CPT

## 2022-02-17 PROCEDURE — 2580000003 HC RX 258: Performed by: EMERGENCY MEDICINE

## 2022-02-17 PROCEDURE — 83690 ASSAY OF LIPASE: CPT

## 2022-02-17 PROCEDURE — 85025 COMPLETE CBC W/AUTO DIFF WBC: CPT

## 2022-02-17 PROCEDURE — 81025 URINE PREGNANCY TEST: CPT

## 2022-02-17 PROCEDURE — 96374 THER/PROPH/DIAG INJ IV PUSH: CPT

## 2022-02-17 PROCEDURE — 83735 ASSAY OF MAGNESIUM: CPT

## 2022-02-17 PROCEDURE — 80076 HEPATIC FUNCTION PANEL: CPT

## 2022-02-17 PROCEDURE — 96375 TX/PRO/DX INJ NEW DRUG ADDON: CPT

## 2022-02-17 PROCEDURE — 6360000002 HC RX W HCPCS: Performed by: EMERGENCY MEDICINE

## 2022-02-17 PROCEDURE — 99284 EMERGENCY DEPT VISIT MOD MDM: CPT

## 2022-02-17 RX ORDER — ONDANSETRON 4 MG/1
4 TABLET, FILM COATED ORAL EVERY 6 HOURS PRN
Qty: 20 TABLET | Refills: 0 | Status: SHIPPED | OUTPATIENT
Start: 2022-02-17 | End: 2022-06-17 | Stop reason: ALTCHOICE

## 2022-02-17 RX ORDER — FENTANYL CITRATE 50 UG/ML
50 INJECTION, SOLUTION INTRAMUSCULAR; INTRAVENOUS ONCE
Status: COMPLETED | OUTPATIENT
Start: 2022-02-17 | End: 2022-02-17

## 2022-02-17 RX ORDER — ONDANSETRON 2 MG/ML
4 INJECTION INTRAMUSCULAR; INTRAVENOUS ONCE
Status: COMPLETED | OUTPATIENT
Start: 2022-02-17 | End: 2022-02-17

## 2022-02-17 RX ORDER — SODIUM CHLORIDE 9 MG/ML
1000 INJECTION, SOLUTION INTRAVENOUS CONTINUOUS
Status: DISCONTINUED | OUTPATIENT
Start: 2022-02-17 | End: 2022-02-17 | Stop reason: HOSPADM

## 2022-02-17 RX ADMIN — ONDANSETRON 4 MG: 2 INJECTION INTRAMUSCULAR; INTRAVENOUS at 11:59

## 2022-02-17 RX ADMIN — SODIUM CHLORIDE 1000 ML: 9 INJECTION, SOLUTION INTRAVENOUS at 11:57

## 2022-02-17 RX ADMIN — FENTANYL CITRATE 50 MCG: 50 INJECTION, SOLUTION INTRAMUSCULAR; INTRAVENOUS at 12:01

## 2022-02-17 ASSESSMENT — PAIN DESCRIPTION - DESCRIPTORS
DESCRIPTORS: ACHING
DESCRIPTORS: SHOOTING;STABBING;CRAMPING

## 2022-02-17 ASSESSMENT — ENCOUNTER SYMPTOMS
CHEST TIGHTNESS: 0
SHORTNESS OF BREATH: 0

## 2022-02-17 ASSESSMENT — PAIN DESCRIPTION - LOCATION
LOCATION: ABDOMEN;FLANK
LOCATION: ABDOMEN
LOCATION: ABDOMEN

## 2022-02-17 ASSESSMENT — PAIN SCALES - GENERAL
PAINLEVEL_OUTOF10: 5
PAINLEVEL_OUTOF10: 8
PAINLEVEL_OUTOF10: 5
PAINLEVEL_OUTOF10: 8

## 2022-02-17 ASSESSMENT — PAIN DESCRIPTION - FREQUENCY
FREQUENCY: CONTINUOUS
FREQUENCY: CONTINUOUS

## 2022-02-17 ASSESSMENT — PAIN DESCRIPTION - PAIN TYPE
TYPE: ACUTE PAIN

## 2022-02-17 ASSESSMENT — PAIN - FUNCTIONAL ASSESSMENT: PAIN_FUNCTIONAL_ASSESSMENT: 0-10

## 2022-02-17 NOTE — ED NOTES
Pt ambulated into ER accompanied by self. Pt states increased sharp LLQ abd pain, pelvic cramping, bladder cramping and left flank pain. Pt states s/sx started approx. 2 weeks ago but worsened since last pelvic ultra sound that was performed on tuseday. Pt has been seeing Dr. Adryan Schwarz the urologist in [facility]. Pt states she has been going to pre- op procedures to a hysterectomy.       Pepper Garcia RN  02/17/22 1302

## 2022-02-17 NOTE — ED PROVIDER NOTES
medication    Hypoglycemia     Kidney stones     Tobacco abuse     UTI (urinary tract infection)     frequency         SURGICALHISTORY       Past Surgical History:   Procedure Laterality Date    CARPAL TUNNEL RELEASE Left     ENDOMETRIAL ABLATION      LITHOTRIPSY      NASAL SEPTUM SURGERY      OTHER SURGICAL HISTORY Right 09/07/2016    laser ureteroscopic lithotripsy    [place] E[place] TRANSORAL BIOPSY SINGLE/MULTIPLE N/A 3/13/2017    EGD BIOPSY performed by [place] Xavier MD at [address] [place] [address] Bilateral     approximately 2013    TUBAL LIGATION      UPPER GASTROINTESTINAL ENDOSCOPY  2010    hiatal hernia     UPPER GASTROINTESTINAL ENDOSCOPY  03/13/2017    mild gastritis, Dr. Stoll Host 3/28/2019    EGD BIOPSY W/48 HR GASCA performed by [place] Xavier MD at [facility] OR.mild inflammation and gastric polyps         CURRENT MEDICATIONS       Previous Medications    ALBUTEROL SULFATE  (90 BASE) MCG/ACT INHALER    Inhale 2 puffs into the lungs 4 times daily as needed for Wheezing    BLOOD GLUCOSE MONITOR STRIPS    Test 1 time a day & as needed for symptoms of irregular blood glucose. DICYCLOMINE (BENTYL) 10 MG CAPSULE    Take 1 capsule by mouth 4 times daily as needed (for lower GI symptoms)    ESOMEPRAZOLE (NEXIUM) 40 MG DELAYED RELEASE CAPSULE    Take 1 capsule by mouth daily    FAMOTIDINE (PEPCID) 20 MG TABLET    Take 1 tablet by mouth nightly    FLUTICASONE (FLONASE) 50 MCG/ACT NASAL SPRAY    2 sprays to each nostril once daily. LANCETS MISC    Use to check glucose once daily and as needed for irregular blood glucose.     LORATADINE (CLARITIN) 10 MG TABLET    Take two tablets once daily    MONTELUKAST (SINGULAIR) 10 MG TABLET    montelukast 10 mg tablet    NONFORMULARY    Take 1 capsule by mouth THRIVE capsule, nutritional shake and patch    NORETHINDRONE-ETHINYL ESTRADIOL (MARGI) 0.5-35 MG-MCG PER TABLET    take 1 tablet by mouth daily for 21 DAYS SKIP TH E [place] TABLETS AND START NEW PACK    PREDNISONE (DELTASONE) 20 MG TABLET    Take 2 tablets by mouth daily for 5 days       ALLERGIES     Latex, Amoxil [amoxicillin], Bactrim [sulfamethoxazole-trimethoprim], Clindamycin/lincomycin, Flagyl [metronidazole], Biaxin [clarithromycin], Ceftin [cefuroxime axetil], and Keflex [cephalexin]    FAMILY HISTORY       Family History   Problem Relation Age of Onset    High Blood Pressure Mother     Stroke Mother     Thyroid Disease Mother     Diabetes Father     High Blood Pressure Father     Cancer Father         Bladder          SOCIAL HISTORY       Social History     Socioeconomic History    Marital status:      Spouse name: None    Number of children: None    Years of education: None    Highest education level: None   Occupational History    None   Tobacco Use    Smoking status: Smoker, Current Status Unknown     Packs/day: 0.50     Years: 10.00     Pack years: 5.00     Start date: 2012    Smokeless tobacco: Never Used    Tobacco comment: vaping   Vaping Use    Vaping Use: Every day    Substances: Nicotine    Devices: Disposable   Substance and Sexual Activity    Alcohol use: Not Currently     Comment: social    Drug use: No    Sexual activity: Yes     Partners: Male     Birth control/protection: Surgical, Pill     Comment: Tubal and ablation   Other Topics Concern    None   Social History Narrative    None     Social Determinants of Health     Financial Resource Strain: Low Risk     Difficulty of Paying Living Expenses: Not hard at all   Food Insecurity: No Food Insecurity    Worried About Running Out of Food in the Last Year: Never true    Renée of Food in the Last Year: Never true   Transportation Needs:     Lack of Transportation (Medical): Not on file    Lack of Transportation (Non-Medical):  Not on file   Physical Activity:     Days of Exercise per Week: Not on file    Minutes of Exercise per Session: Not on file   Stress:     Feeling of Stress : Not on file   Social Connections:     Frequency of Communication with Friends and Family: Not on file    Frequency of Social Gatherings with Friends and Family: Not on file    Attends [employer] Services: Not on file    Active Member of Clubs or Organizations: Not on file    Attends Club or Organization Meetings: Not on file    Marital Status: Not on file   Intimate Partner Violence:     Fear of Current or Ex-Partner: Not on file    Emotionally Abused: Not on file    Physically Abused: Not on file    Sexually Abused: Not on file   Housing Stability:     Unable to Pay for Housing in the Last Year: Not on file    Number of [place] in the Last Year: Not on file    Unstable Housing in the Last Year: Not on file       SCREENINGS             PHYSICAL EXAM    (up to 7 for level 4, 8 or more for level 5)     ED Triage Vitals [02/17/22 1124]   BP Temp Temp Source Pulse Resp SpO2 Height Weight   (!) 160/118 99.1 °F (37.3 °C) Tympanic 114 18 98 % 5' 3\" (1.6 m) 132 lb (59.9 kg)       Physical Exam  Vitals reviewed. Constitutional:       General: She is in acute distress. Appearance: She is not ill-appearing. HENT:      Head: Normocephalic. Nose: Nose normal.   Eyes:      General: No scleral icterus. Extraocular Movements: Extraocular movements intact. Cardiovascular:      Rate and Rhythm: Regular rhythm. Tachycardia present. Heart sounds: Normal heart sounds. Pulmonary:      Effort: Pulmonary effort is normal. No respiratory distress. Breath sounds: Normal breath sounds. No rhonchi or rales. Abdominal:      General: Bowel sounds are decreased. There is no distension. Palpations: Abdomen is soft. There is no fluid wave, hepatomegaly or splenomegaly. Tenderness: There is abdominal tenderness in the right lower quadrant, suprapubic area and left lower quadrant. There is guarding. There is no rebound. Hernia: No hernia is present. 98%   Weight: 132 lb (59.9 kg)   Height: 5' 3\" (1.6 m)            Patient urinalysis does not show signs of infection. She has a normal white count 8.5. The rest of her bloodwork is unremarkable except for slight elevation of the blood glucose of 178. She states she has been on a keto diet for the last 1 year. She has occasional constipation and occasional diarrhea but does not report bloody stool. Dr. Layo Jackson was contacted and states that he will have his office follow-up with her to arrange for colonoscopy as part of the work-up. Patient was prescribed dicyclomine by Dr. Layo Jackson in January of this year but states she does not normally take it. She is advised to take it 3 times a day for the next 4 to 5 days. She is also prescribed Zofran for nausea and vomiting as needed. She may return anytime for worsening symptoms. MDM    CONSULTS:  None    PROCEDURES:  Unlessotherwise noted below, none     Procedures    FINAL IMPRESSION      1.  Lower abdominal pain          DISPOSITION/PLAN   DISPOSITION Decision To Discharge 02/17/2022 12:43:24 PM      PATIENT REFERRED TO:  DO Clarke Kee [address] [place] [address]  [phone]            DISCHARGE MEDICATIONS:         Problem List:  Patient Active Problem List   Diagnosis Code    Hyperlipidemia E78.5    Tobacco use Z72.0    Vertigo, benign paroxysmal H81.10    Type 2 diabetes mellitus without complication, without long-term current use of insulin ([facility]) E11.9    LPRD (laryngopharyngeal reflux disease) K21.9    Seasonal allergic rhinitis due to pollen J30.1    Sinus problem J34.9           Summation      Patient Course: Discharged    ED Medicationsadministered this visit:    Medications   0.9 % sodium chloride infusion (1,000 mLs IntraVENous New Bag 2/17/22 0315)   ondansetron (ZOFRAN) injection 4 mg (4 mg IntraVENous Given 2/17/22 1159)   fentaNYL (SUBLIMAZE) injection 50 mcg (50 mcg IntraVENous Given 2/17/22 1201)       New Prescriptions from this visit:    New Prescriptions    ONDANSETRON (ZOFRAN) 4 MG TABLET    Take 1 tablet by mouth every 6 hours as needed for Nausea or Vomiting       Follow-up:  Asha Tristan,   [address] [id number]  [address] [facility], [address]  [phone]              Final Impression:   1.  Lower abdominal pain               (Please note that portions of this note were completed with a voice recognitionprogram.  Efforts were made to edit the dictations but occasionally words are mis-transcribed.)    Jane Nichole MD (electronically signed)  Attending Emergency Physician            Jane Nichole MD  02/17/22 5203

## 2022-02-18 ENCOUNTER — TELEPHONE (OUTPATIENT)
Dept: SURGERY | Age: 42
End: 2022-02-18

## 2022-02-18 ENCOUNTER — HOSPITAL ENCOUNTER (OUTPATIENT)
Dept: GENERAL RADIOLOGY | Age: 42
Discharge: HOME | End: 2022-02-20
Payer: COMMERCIAL

## 2022-02-18 DIAGNOSIS — N20.0 NEPHROLITHIASIS: ICD-10-CM

## 2022-02-18 PROCEDURE — 74018 RADEX ABDOMEN 1 VIEW: CPT

## 2022-02-18 NOTE — TELEPHONE ENCOUNTER
Contacted patient and reviewed that on 6/2020 Dr. Cristiane Elaine ordered 20 mg QID as needed and then most recently ordered on 1/7  10 mg QID as needed. Patient states she will call if she has any questions or concerns.

## 2022-02-18 NOTE — TELEPHONE ENCOUNTER
Patient called requesting to speak with Dr Gabriella Moe nurse. She states she had to go to the Emergency Room yesterday. She states the ER physician had recommended she take bentyl 3 times a day.   She is inquiring what was her recommended dosage per Dr Harriet Morrissey in the past.  Call her back at [phone]

## 2022-02-21 ENCOUNTER — TELEPHONE (OUTPATIENT)
Dept: SURGERY | Age: 42
End: 2022-02-21

## 2022-02-21 NOTE — TELEPHONE ENCOUNTER
Patient is complaining of abdominal pain, lower abdomen. Pain was so bad she went to the ER on Thursday. They treated her with Bentyl which does not seem to help. She saw OB-Gyn. They did a pelvis ultrasound. They did not find any cause for her pain. Dr. Rere Nava ordered an abdominal x-ray. Patient saw report on Mychart. She took something to clean stool out. She is having severe pain this morning. She would like you to look at abdominal xray.

## 2022-02-21 NOTE — TELEPHONE ENCOUNTER
Received call from patient's  stating that he would like patient to be seen tomorrow by Dr. Abigail Simons for her abd pain. I explained Dr. Abigail Simons does not typically see patients on tuesdays as he is in endo all day. He reports that she is still having severe lower abd pain which comes and goes but has mostly been consistent. Does report nausea but no vomiting. He also reports that her pain has been so severe she was on the ground as nothing has seemed to help the pain. I explained that if she is in that severe of pain and nothing has helped that we would recommend going to the ER to be evaluated even though they were in the ER on Thursday. I explained I will send a message to Dr. Abigail Simons regarding her pain and see what his recommendations are and if he can see her soon. I did explain that Dr. Abigail Simons has been in surgery all day and currently still in surgery, so I am not sure when he will be able to review this note, but once he reviews the note our office will give them a call. Emily can be reached at [phone].

## 2022-02-22 ENCOUNTER — PATIENT MESSAGE (OUTPATIENT)
Dept: FAMILY MEDICINE CLINIC | Age: 42
End: 2022-02-22

## 2022-02-22 RX ORDER — ESOMEPRAZOLE MAGNESIUM 40 MG/1
CAPSULE, DELAYED RELEASE ORAL
Qty: 90 CAPSULE | Refills: 3 | Status: SHIPPED | OUTPATIENT
Start: 2022-02-22

## 2022-02-22 NOTE — TELEPHONE ENCOUNTER
Contacted patient and reviewed Dr. Fidel Rasmussen recommendations regarding abd pain. Patient scheduled for CS on 3/1.

## 2022-02-22 NOTE — TELEPHONE ENCOUNTER
Received call again from patient wanting to know if Dr. Natalie Blanchard has been able to review the note regarding her abd pain. I apologized and explained that he has a surgery case that took all day yesterday and he did not have a chance to look in his in basket. She reports still having abd pain rating 8-9/10 has been constant and at times gets sharp pains. Has tried tylenol and ibuprofen without relief. Reports being nauseated and barely ate the past couple days. She states she almost went to the ER last night due to the pain but decided not to go because she states all they do is give her fluids and medications and then sends her home. She states last BM was Sunday night after she took medication over the weekend to clean her out as she thought this was the problem. Wanting to know what Dr. Fidel Rasmussen recommendation was for her abd pain. Patient is scheduled for CS on 3/1. Explained I will send a message to Dr. Natalie Blanchard and will call her back as soon as he responds. She verbalized understanding.

## 2022-02-22 NOTE — TELEPHONE ENCOUNTER
Yes , unfortunately I do not know why she is having pain at this point. All test have been fairly normal    I will set up a CS at first available time that we can. But is she is having 8 out 10 pain, she should go to the ER   They can do necessary test to have best ability to diagnose this acutely. If nothing is found then we can keep on CS schedule. ,

## 2022-02-28 ENCOUNTER — HOSPITAL ENCOUNTER (OUTPATIENT)
Dept: NON INVASIVE DIAGNOSTICS | Age: 42
Discharge: HOME | End: 2022-02-28
Payer: COMMERCIAL

## 2022-02-28 ENCOUNTER — HOSPITAL ENCOUNTER (OUTPATIENT)
Dept: LAB | Age: 42
Discharge: HOME | End: 2022-02-28
Payer: COMMERCIAL

## 2022-02-28 LAB
ABSOLUTE EOS #: 0.05 K/UL (ref 0–0.44)
ABSOLUTE IMMATURE GRANULOCYTE: <0.03 K/UL (ref 0–0.3)
ABSOLUTE LYMPH #: 2.46 K/UL (ref 1.1–3.7)
ABSOLUTE MONO #: 0.36 K/UL (ref 0.1–1.2)
ANION GAP SERPL CALCULATED.3IONS-SCNC: 11 MMOL/L (ref 9–17)
BASOPHILS # BLD: 1 % (ref 0–2)
BASOPHILS ABSOLUTE: 0.05 K/UL (ref 0–0.2)
BUN BLDV-MCNC: 12 MG/DL (ref 6–20)
BUN/CREAT BLD: 17 (ref 9–20)
CALCIUM SERPL-MCNC: 9.4 MG/DL (ref 8.6–10.4)
CHLORIDE BLD-SCNC: 104 MMOL/L (ref 98–107)
CO2: 27 MMOL/L (ref 20–31)
CREAT SERPL-MCNC: 0.71 MG/DL (ref 0.5–0.9)
EOSINOPHILS RELATIVE PERCENT: 1 % (ref 1–4)
GFR AFRICAN AMERICAN: >60 ML/MIN
GFR NON-AFRICAN AMERICAN: >60 ML/MIN
GFR SERPL CREATININE-BSD FRML MDRD: ABNORMAL ML/MIN/{1.73_M2}
GLUCOSE BLD-MCNC: 120 MG/DL (ref 70–99)
HCT VFR BLD CALC: 38.9 % (ref 36.3–47.1)
HEMOGLOBIN: 13.4 G/DL (ref 11.9–15.1)
IMMATURE GRANULOCYTES: 0 %
LYMPHOCYTES # BLD: 29 % (ref 24–43)
MCH RBC QN AUTO: 33.6 PG (ref 25.2–33.5)
MCHC RBC AUTO-ENTMCNC: 34.4 G/DL (ref 25.2–33.5)
MCV RBC AUTO: 97.5 FL (ref 82.6–102.9)
MONOCYTES # BLD: 4 % (ref 3–12)
NRBC AUTOMATED: 0 PER 100 WBC
PDW BLD-RTO: 12.2 % (ref 11.8–14.4)
PLATELET # BLD: 223 K/UL (ref 138–453)
PMV BLD AUTO: 11.1 FL (ref 8.1–13.5)
POTASSIUM SERPL-SCNC: 4.2 MMOL/L (ref 3.7–5.3)
RBC # BLD: 3.99 M/UL (ref 3.95–5.11)
SEG NEUTROPHILS: 65 % (ref 36–65)
SEGMENTED NEUTROPHILS ABSOLUTE COUNT: 5.52 K/UL (ref 1.5–8.1)
SODIUM BLD-SCNC: 142 MMOL/L (ref 135–144)
WBC # BLD: 8.5 K/UL (ref 3.5–11.3)

## 2022-02-28 PROCEDURE — 36415 COLL VENOUS BLD VENIPUNCTURE: CPT

## 2022-02-28 PROCEDURE — 93005 ELECTROCARDIOGRAM TRACING: CPT | Performed by: OBSTETRICS & GYNECOLOGY

## 2022-02-28 PROCEDURE — 80048 BASIC METABOLIC PNL TOTAL CA: CPT

## 2022-02-28 PROCEDURE — 85025 COMPLETE CBC W/AUTO DIFF WBC: CPT

## 2022-03-01 ENCOUNTER — ANESTHESIA (OUTPATIENT)
Dept: OPERATING ROOM | Age: 42
End: 2022-03-01
Payer: COMMERCIAL

## 2022-03-01 ENCOUNTER — ANESTHESIA EVENT (OUTPATIENT)
Dept: OPERATING ROOM | Age: 42
End: 2022-03-01
Payer: COMMERCIAL

## 2022-03-01 ENCOUNTER — HOSPITAL ENCOUNTER (OUTPATIENT)
Age: 42
Setting detail: OUTPATIENT SURGERY
Discharge: HOME | End: 2022-03-01
Attending: SURGERY | Admitting: SURGERY
Payer: COMMERCIAL

## 2022-03-01 VITALS
OXYGEN SATURATION: 97 % | BODY MASS INDEX: 22.5 KG/M2 | TEMPERATURE: 97.8 F | SYSTOLIC BLOOD PRESSURE: 139 MMHG | WEIGHT: 127 LBS | DIASTOLIC BLOOD PRESSURE: 67 MMHG | HEIGHT: 63 IN | HEART RATE: 68 BPM | RESPIRATION RATE: 16 BRPM

## 2022-03-01 VITALS — DIASTOLIC BLOOD PRESSURE: 95 MMHG | OXYGEN SATURATION: 98 % | SYSTOLIC BLOOD PRESSURE: 157 MMHG

## 2022-03-01 LAB
GLUCOSE BLD-MCNC: 140 MG/DL (ref 65–105)
HCG, PREGNANCY URINE (POC): NEGATIVE

## 2022-03-01 PROCEDURE — 6360000002 HC RX W HCPCS: Performed by: NURSE ANESTHETIST, CERTIFIED REGISTERED

## 2022-03-01 PROCEDURE — 3700000001 HC ADD 15 MINUTES (ANESTHESIA): Performed by: SURGERY

## 2022-03-01 PROCEDURE — 3700000000 HC ANESTHESIA ATTENDED CARE: Performed by: SURGERY

## 2022-03-01 PROCEDURE — 7100000011 HC PHASE II RECOVERY - ADDTL 15 MIN: Performed by: SURGERY

## 2022-03-01 PROCEDURE — 2709999900 HC NON-CHARGEABLE SUPPLY: Performed by: SURGERY

## 2022-03-01 PROCEDURE — 45378 DIAGNOSTIC COLONOSCOPY: CPT | Performed by: SURGERY

## 2022-03-01 PROCEDURE — 3609027000 HC COLONOSCOPY: Performed by: SURGERY

## 2022-03-01 PROCEDURE — 82947 ASSAY GLUCOSE BLOOD QUANT: CPT

## 2022-03-01 PROCEDURE — 81025 URINE PREGNANCY TEST: CPT

## 2022-03-01 PROCEDURE — 2580000003 HC RX 258: Performed by: SURGERY

## 2022-03-01 PROCEDURE — 7100000010 HC PHASE II RECOVERY - FIRST 15 MIN: Performed by: SURGERY

## 2022-03-01 RX ORDER — SODIUM CHLORIDE, SODIUM LACTATE, POTASSIUM CHLORIDE, CALCIUM CHLORIDE 600; 310; 30; 20 MG/100ML; MG/100ML; MG/100ML; MG/100ML
INJECTION, SOLUTION INTRAVENOUS CONTINUOUS
Status: DISCONTINUED | OUTPATIENT
Start: 2022-03-01 | End: 2022-03-01 | Stop reason: HOSPADM

## 2022-03-01 RX ORDER — SODIUM CHLORIDE 9 MG/ML
25 INJECTION, SOLUTION INTRAVENOUS PRN
Status: DISCONTINUED | OUTPATIENT
Start: 2022-03-01 | End: 2022-03-01 | Stop reason: HOSPADM

## 2022-03-01 RX ORDER — PROPOFOL 10 MG/ML
INJECTION, EMULSION INTRAVENOUS PRN
Status: DISCONTINUED | OUTPATIENT
Start: 2022-03-01 | End: 2022-03-01 | Stop reason: SDUPTHER

## 2022-03-01 RX ORDER — MIDAZOLAM HYDROCHLORIDE 1 MG/ML
INJECTION INTRAMUSCULAR; INTRAVENOUS PRN
Status: DISCONTINUED | OUTPATIENT
Start: 2022-03-01 | End: 2022-03-01 | Stop reason: SDUPTHER

## 2022-03-01 RX ORDER — SODIUM CHLORIDE 0.9 % (FLUSH) 0.9 %
5-40 SYRINGE (ML) INJECTION EVERY 12 HOURS SCHEDULED
Status: DISCONTINUED | OUTPATIENT
Start: 2022-03-01 | End: 2022-03-01 | Stop reason: HOSPADM

## 2022-03-01 RX ORDER — SODIUM CHLORIDE 0.9 % (FLUSH) 0.9 %
5-40 SYRINGE (ML) INJECTION PRN
Status: DISCONTINUED | OUTPATIENT
Start: 2022-03-01 | End: 2022-03-01 | Stop reason: HOSPADM

## 2022-03-01 RX ORDER — SODIUM CHLORIDE, SODIUM LACTATE, POTASSIUM CHLORIDE, CALCIUM CHLORIDE 600; 310; 30; 20 MG/100ML; MG/100ML; MG/100ML; MG/100ML
INJECTION, SOLUTION INTRAVENOUS CONTINUOUS
Status: CANCELLED | OUTPATIENT
Start: 2022-03-01

## 2022-03-01 RX ADMIN — PROPOFOL 100 MG: 10 INJECTION, EMULSION INTRAVENOUS at 09:59

## 2022-03-01 RX ADMIN — MIDAZOLAM HYDROCHLORIDE 2 MG: 1 INJECTION, SOLUTION INTRAMUSCULAR; INTRAVENOUS at 09:42

## 2022-03-01 RX ADMIN — PROPOFOL 100 MG: 10 INJECTION, EMULSION INTRAVENOUS at 09:53

## 2022-03-01 RX ADMIN — SODIUM CHLORIDE, POTASSIUM CHLORIDE, SODIUM LACTATE AND CALCIUM CHLORIDE: 600; 310; 30; 20 INJECTION, SOLUTION INTRAVENOUS at 09:14

## 2022-03-01 RX ADMIN — PROPOFOL 100 MG: 10 INJECTION, EMULSION INTRAVENOUS at 09:47

## 2022-03-01 ASSESSMENT — PAIN DESCRIPTION - ORIENTATION: ORIENTATION: MID

## 2022-03-01 ASSESSMENT — PAIN SCALES - GENERAL
PAINLEVEL_OUTOF10: 5
PAINLEVEL_OUTOF10: 5

## 2022-03-01 ASSESSMENT — PAIN - FUNCTIONAL ASSESSMENT: PAIN_FUNCTIONAL_ASSESSMENT: 0-10

## 2022-03-01 ASSESSMENT — PAIN DESCRIPTION - LOCATION: LOCATION: ABDOMEN

## 2022-03-01 ASSESSMENT — PAIN DESCRIPTION - DESCRIPTORS: DESCRIPTORS: CRAMPING

## 2022-03-01 NOTE — ANESTHESIA PRE PROCEDURE
Department of Anesthesiology  Preprocedure Note       Name:  Gustabo Jaffe   Age:  36 y.o.  :  1980                                          MRN:  [id number]         Date:  3/1/2022      Surgeon: Marnie Montano):  Idalia Aparicio MD    Procedure: Procedure(s):  nv COVID+-COLONOSCOPY    Medications prior to admission:   Prior to Admission medications    Medication Sig Start Date End Date Taking? Authorizing Provider   esomeprazole (NEXIUM) 40 MG delayed release capsule TAKE 1 CAPSULE DAILY 22  Yes Idalia Aparicio MD   norethindrone-ethinyl estradiol (MARGI) 0.5-35 MG-MCG per tablet take 1 tablet by mouth daily for 21 DAYS SKIP TH E PLACEBO TABLETS AND START NEW PACK 22  Yes Ursula Fields, DO   blood glucose monitor strips Test 1 time a day & as needed for symptoms of irregular blood glucose. 21  Yes Cleatis Blush Black, DO   Lancets MISC Use to check glucose once daily and as needed for irregular blood glucose. 21  Yes Cleatis Blush Black, DO   famotidine (PEPCID) 20 MG tablet Take 1 tablet by mouth nightly 21  Yes Idalia Aparicio MD   dicyclomine (BENTYL) 10 MG capsule Take 1 capsule by mouth 4 times daily as needed (for lower GI symptoms) 21  Yes Idalia Aparicio MD   loratadine (CLARITIN) 10 MG tablet Take two tablets once daily 6/10/20  Yes DEAN Nickerson CNP   NONFORMULARY Take 1 capsule by mouth THRIVE capsule, nutritional shake and patch   Yes Historical Provider, MD   montelukast (SINGULAIR) 10 MG tablet montelukast 10 mg tablet 20  Yes Historical Provider, MD   fluticasone (FLONASE) 50 MCG/ACT nasal spray 2 sprays to each nostril once daily.  19  Yes DEAN Nickerson CNP   ondansetron (ZOFRAN) 4 MG tablet Take 1 tablet by mouth every 6 hours as needed for Nausea or Vomiting 22   Jf Kelly MD   albuterol sulfate  (90 Base) MCG/ACT inhaler Inhale 2 puffs into the lungs 4 times daily as needed for Wheezing 3/18/19   DEAN Olivares - CNP       Current medications:    Current Facility-Administered Medications   Medication Dose Route Frequency Provider Last Rate Last Admin    lactated ringers infusion   IntraVENous Continuous Shani Avendano MD           Allergies:     Allergies   Allergen Reactions    Latex Dermatitis    Amoxil [Amoxicillin] Diarrhea    Bactrim [Sulfamethoxazole-Trimethoprim] Itching    Clindamycin/Lincomycin      Nausea,vomiting    Flagyl [Metronidazole] Other (See Comments)     Sees bugs crawling    Biaxin [Clarithromycin] Nausea And Vomiting    Ceftin [Cefuroxime Axetil] Diarrhea and Nausea And Vomiting    Keflex [Cephalexin] Nausea And Vomiting       Problem List:    Patient Active Problem List   Diagnosis Code    Hyperlipidemia E78.5    Tobacco use Z72.0    Vertigo, benign paroxysmal H81.10    Type 2 diabetes mellitus without complication, without long-term current use of insulin ([facility]) E11.9    LPRD (laryngopharyngeal reflux disease) K21.9    Seasonal allergic rhinitis due to pollen J30.1    Sinus problem J34.9       Past Medical History:        Diagnosis Date    Allergic rhinitis     GERD (gastroesophageal reflux disease)     Hemorrhoids     Hyperlipidemia     Hypertension     treated in past but lost weight currently on no medication    Hypoglycemia     Kidney stones     Tobacco abuse     UTI (urinary tract infection)     frequency       Past Surgical History:        Procedure Laterality Date    CARPAL TUNNEL RELEASE Left     ENDOMETRIAL ABLATION      LITHOTRIPSY      NASAL SEPTUM SURGERY      OTHER SURGICAL HISTORY Right 09/07/2016    laser ureteroscopic lithotripsy    MS EGD TRANSORAL BIOPSY SINGLE/MULTIPLE N/A 3/13/2017    EGD BIOPSY performed by Shani Avendano MD at [facility] Bilateral     approximately 2013    TUBAL LIGATION      UPPER GASTROINTESTINAL ENDOSCOPY  2010    hiatal hernia     UPPER GASTROINTESTINAL ENDOSCOPY  03/13/2017    mild gastritis, Dr. Shantell Soria ENDOSCOPY N/A 3/28/2019    EGD BIOPSY W/48 HR GASCA performed by Sb Feldman MD at [facility] OR.mild inflammation and gastric polyps       Social History:    Social History     Tobacco Use    Smoking status: Smoker, Current Status Unknown     Packs/day: 0.50     Years: 10.00     Pack years: 5.00     Start date: 2012    Smokeless tobacco: Never Used    Tobacco comment: vaping   Substance Use Topics    Alcohol use: Not Currently     Comment: social                                Ready to quit: Not Answered  Counseling given: Not Answered  Comment: vaping      Vital Signs (Current):   Vitals:    03/01/22 0857   BP: (!) 146/103   Pulse: 90   Resp: 18   Temp: 36.7 °C (98.1 °F)   TempSrc: Oral   SpO2: 97%   Weight: 127 lb (57.6 kg)   Height: 5' 3\" (1.6 m)                                              BP Readings from Last 3 Encounters:   03/01/22 (!) 146/103   02/17/22 (!) 142/89   02/16/22 118/78       NPO Status: Time of last liquid consumption: 2200                        Time of last solid consumption: 1830                        Date of last liquid consumption: 02/28/22                        Date of last solid food consumption: 02/27/22    BMI:   Wt Readings from Last 3 Encounters:   03/01/22 127 lb (57.6 kg)   02/17/22 132 lb (59.9 kg)   02/16/22 133 lb 8 oz (60.6 kg)     Body mass index is 22.5 kg/m².     CBC:   Lab Results   Component Value Date    WBC 8.5 02/28/2022    RBC 3.99 02/28/2022    HGB 13.4 02/28/2022    HCT 38.9 02/28/2022    MCV 97.5 02/28/2022    RDW 12.2 02/28/2022     02/28/2022       CMP:   Lab Results   Component Value Date     02/28/2022    K 4.2 02/28/2022     02/28/2022    CO2 27 02/28/2022    BUN 12 02/28/2022    CREATININE 0.71 02/28/2022    GFRAA >60 02/28/2022    LABGLOM >60 02/28/2022    GLUCOSE 120 02/28/2022    PROT 8.3 02/17/2022    CALCIUM 9.4 02/28/2022    BILITOT 0.46 02/17/2022    ALKPHOS 46 02/17/2022    AST 24 02/17/2022    ALT 31 02/17/2022       POC Tests: No results for input(s): POCGLU, POCNA, POCK, POCCL, POCBUN, POCHEMO, POCHCT in the last 72 hours. Coags: No results found for: PROTIME, INR, APTT    HCG (If Applicable):   Lab Results   Component Value Date    PREGTESTUR NEGATIVE 02/17/2022        ABGs: No results found for: PHART, PO2ART, XHD1YPW, DJL9FLE, BEART, N2EGDCCH     Type & Screen (If Applicable):  No results found for: LABABO, LABRH    Drug/Infectious Status (If Applicable):  Lab Results   Component Value Date    HEPCAB NONREACTIVE 07/18/2016       COVID-19 Screening (If Applicable):   Lab Results   Component Value Date    COVID19 DETECTED 12/27/2021           Anesthesia Evaluation  Patient summary reviewed and Nursing notes reviewed no history of anesthetic complications:   Airway: Mallampati: II  TM distance: >3 FB   Neck ROM: full  Mouth opening: > = 3 FB Dental: normal exam         Pulmonary:Negative Pulmonary ROS and normal exam  breath sounds clear to auscultation            Patient did not smoke on day of surgery. Cardiovascular:  Exercise tolerance: good (>4 METS),   (+) hypertension:, hyperlipidemia        Rhythm: regular  Rate: normal           Beta Blocker:  Not on Beta Blocker         Neuro/Psych:   Negative Neuro/Psych ROS              GI/Hepatic/Renal:   (+) GERD:, bowel prep,           Endo/Other:    (+) DiabetesType II DM, , .          Pt had no PAT visit       Abdominal:       Abdomen: soft. Vascular: negative vascular ROS. Other Findings:             Anesthesia Plan      MAC     ASA 2       Induction: intravenous. MIPS: Postoperative opioids intended and Prophylactic antiemetics administered. Anesthetic plan and risks discussed with patient.       Plan discussed with surgical team.                  Grafton Carrel, APRN - CRNA   3/1/2022

## 2022-03-01 NOTE — ANESTHESIA POSTPROCEDURE EVALUATION
Department of Anesthesiology  Postprocedure Note    Patient: Wayland Cushing  MRN: [id number]  YOB: 1980  Date of evaluation: 3/1/2022  Time:  10:06 AM     Procedure Summary     Date: 03/01/22 Room / Location: [address]    Anesthesia Start: 2141 Anesthesia Stop: 6379    Procedure: COLONOSCOPY (N/A ) Diagnosis: (left lower quad pain)    Surgeons: Arvin Flores MD Responsible Provider: Adrianne Seip, APRN - CRNA    Anesthesia Type: MAC ASA Status: 2          Anesthesia Type: MAC    Phong Phase I: Phong Score: 10    Phong Phase II:      Last vitals: Reviewed and per EMR flowsheets.        Anesthesia Post Evaluation    Patient location during evaluation: PACU  Patient participation: complete - patient participated  Level of consciousness: awake, awake and alert and responsive to light touch  Pain score: 0  Airway patency: patent  Nausea & Vomiting: no nausea and no vomiting  Complications: no  Cardiovascular status: blood pressure returned to baseline and hemodynamically stable  Respiratory status: acceptable  Hydration status: euvolemic

## 2022-03-01 NOTE — OP NOTE
COLONOSCOPY PROCEDURE NOTE:      Pre op diagnosis:     left lower quad pain       Operative Procedure:    1. Colonoscopy     Surgeon:    Dr. Michael Puga     Anesthesia:    IV sedation per CRNA    EBL:  minimal    Procedure:    Patient was taken to the endoscopy suite and placed in a left lateral decubitus position. They were given IV sedation as mentioned above. A digital rectal exam was performed. There were no masses and anal tone was normal.  The colonoscope was inserted into the rectum and carefully manipulated up through the sigmoid colon, transverse colon, right colon and into the cecum. The cecum was identified by the ileal cecal valve and transabdominal illumination. Then the scope was slowly withdrawn. The scope was retroflexed in the rectum and the dentate line was examined. The scope was removed. The patient tolerated the procedure well. The following findings were noted. Final Diagnosis:    1. Normal colon    Plan:  1. Repeat CS in 10 years for screening  2. Proceed with partial hysterectomy per Ob-Gyn to see if it helps with lower abd pain. ADDENDUM:  Endo Review :  3/27/2022    Pathology:    none    Plan:    1.   As above in the op note plan

## 2022-03-01 NOTE — H&P
Reina Lewis is a 42 y.o. female        CC:     Follow up GERD and IBS     HISTORY OF PRESENT ILLNESS:     Pt is 41 yo famale , with hx GERD and IBS. Last EGD with 48 hour bravo was done on 3-28-19. Is currently on nex and pepcid daily and no symtpoms.     But IBS or lower abd pressure is bothering her. Last few weeks started , after eating worse,  But is constant last week . Has been about 8/10  Now a 2 out 10. No diarrhea or consitpation. Describe as  Pressure in the mid lower abd , about where her bladder is. Cramping , feels like a balloon is being blown up inside. Sharp pain in the left .       Had covid in dec. Was seen in ER 1/4/22, had slightly low K+, 3.5 she wandered if this could be the cause.     Has OB/GYn apt and PCP with Dr. Ramon Menjivar in next several weeks.     Never had a CS .   No fever chills with this symptoms.     Abd pain: yes  Anemia: no  Bloating:yes  Diarrhea: no  Constipation: no  Melena: no  Hematochezia:no  Rectal Bleeding:yes, occasional if hard stool  Rectal/Anal Pain:no  Pruritus: no  Family history colon Cancer: no  Previous colon cancer: no  Previous Colon Polyp: no  Change in bowels: no  Decrease caliber of stool: no  Change in color of stool: no     Previous work up date: None           Review of Systems:     General:  Fever: Negative  Weight Change:Negative  Night Sweats: Negative     Eye:  Blurry Vision:Negative  Double Vision: Negative     Ent:  Headaches: Negative  Sore throat: Negative     Allergy/Immunology:  Hives: Negative  Latex allergy: Negative     Hematology/Lymphatic:  Bleeding Problems: Negative  Blood Clots: Negative  Swollen Lymph Nodes: Negative     Lungs:  Cough: Negative  SOB: Negative  Wheezing:Negative     Cardiovascular:  Chest Pain: Negative  Palpitations:Negative     GI:   Decreased Appetite: Negative  Heartburn: Negative  Dysphagia: Negative  Nausea/Vomiting: Negative  Abdominal Pain: Negative  Change in Bowels:Negative  Constipation: Connections:     Frequency of Communication with Friends and Family: Not on file    Frequency of Social Gatherings with Friends and Family: Not on file    Attends [employer] Services: Not on file    Active Member of Clubs or Organizations: Not on file    Attends Club or Organization Meetings: Not on file    Marital Status: Not on file   Intimate Partner Violence:     Fear of Current or Ex-Partner: Not on file    Emotionally Abused: Not on file    Physically Abused: Not on file    Sexually Abused: Not on file   Housing Stability:     Unable to Pay for Housing in the Last Year: Not on file    Number of [place] in the Last Year: Not on file    Unstable Housing in the Last Year: Not on file         Past Surgical History         Past Surgical History:   Procedure Laterality Date    CARPAL TUNNEL RELEASE Left      ENDOMETRIAL ABLATION        LITHOTRIPSY        [facility] Right 09/07/2016     laser ureteroscopic lithotripsy    [place] EGD TRANSORAL BIOPSY SINGLE/MULTIPLE N/A 3/13/2017     EGD BIOPSY performed by Sathya Knapp MD at [address] [place] [address] Bilateral       approximately 2013   [id number]        UPPER GASTROINTESTINAL ENDOSCOPY   2010     hiatal hernia     UPPER GASTROINTESTINAL ENDOSCOPY   03/13/2017     mild gastritis, Dr. Pat Lee N/A 3/28/2019     EGD BIOPSY W/48 HR GASCA performed by S[facility]ya Knapp MD at [facility] OR.mild inflammation and gastric polyps         Past Medical History        Past Medical History:   Diagnosis Date    Allergic rhinitis      GERD (gastroesophageal reflux disease)      Hemorrhoids      Hyperlipidemia      Hypertension       treated in past but lost weight currently on no medication    Hypoglycemia      Kidney stones      Tobacco abuse      UTI (urinary tract infection)       frequency                Current Outpatient Medications on File Prior to Visit   Medication Sig Dispense Refill    norethindrone-ethinyl estradiol (NECON) 0.5-35 MG-MCG per tablet Take 1 tab by mouth daily x 21 days, then skip placebo pills and start new pack. 3 packet 1    blood glucose monitor strips Test 1 time a day & as needed for symptoms of irregular blood glucose. 200 strip 3    Lancets MISC Use to check glucose once daily and as needed for irregular blood glucose. 200 each 3    famotidine (PEPCID) 20 MG tablet Take 1 tablet by mouth nightly 60 tablet 3    dicyclomine (BENTYL) 10 MG capsule Take 1 capsule by mouth 4 times daily as needed (for lower GI symptoms) 120 capsule 3    esomeprazole (NEXIUM) 40 MG delayed release capsule Take 1 capsule by mouth daily 180 capsule 3    loratadine (CLARITIN) 10 MG tablet Take two tablets once daily 180 tablet 3    NONFORMULARY Take 1 capsule by mouth THRIVE capsule, nutritional shake and patch        montelukast (SINGULAIR) 10 MG tablet montelukast 10 mg tablet        fluticasone (FLONASE) 50 MCG/ACT nasal spray 2 sprays to each nostril once daily.  3 Bottle 3    albuterol sulfate  (90 Base) MCG/ACT inhaler Inhale 2 puffs into the lungs 4 times daily as needed for Wheezing 1 Inhaler 0      No current facility-administered medications on file prior to visit.            Allergies as of 01/20/2022 - Fully Reviewed 01/14/2022   Allergen Reaction Noted    Latex Dermatitis 02/21/2014    Amoxil [amoxicillin] Diarrhea 09/12/2013    Bactrim [sulfamethoxazole-trimethoprim] Itching 12/23/2013    Clindamycin/lincomycin   02/26/2014    Flagyl [metronidazole] Other (See Comments) 09/12/2013    Biaxin [clarithromycin] Nausea And Vomiting 09/07/2016    Ceftin [cefuroxime axetil] Diarrhea and Nausea And Vomiting 04/24/2017    Keflex [cephalexin] Nausea And Vomiting 12/14/2015            PHYSICAL EXAM:     Blood pressure 130/68, pulse 96, temperature 99 °F (37.2 °C), temperature source Tympanic, height 5' 3\" (1.6 m), weight 130 lb (59 kg), last menstrual period 12/31/2021, not currently breastfeeding.     Gen:  A and O x 3, NAD, well nourished  Eyes:  Sclera non icterus, PERRL  Head:  Normocephalic, non-tender  Neck:  Supple, no adenopathy, thyroid non tender and no masses,no carotid bruits  Lungs:  CTA, symmetrical  Chest:  RRR, no murmurs  Abd:  Soft, mild LLQ tenderness. No R or G no masses  Ext:  No edema, no cyanosis  Psych: reveals appropriate mood, memory and judgment,  Neuro:  Reveals no gross motor or sensory deficits,   Msk:  5/5 strength all 4 extremities, no joint tenderness              ASSESS MENT:     1. LLQ pain     Is almost gone. Is a 2 out of 10. It has been there for about 1 to 2 weeks. I told her I think this probably is a mild diverticulitis is most likely. However I think to be aggressive we should consider a CT scan, colonoscopy, and lab work. And then try a 1 to 2-week period of antibiotics. She really did not want to be that aggressive as she wanted to just try a dose of antibiotics.     I think that is also 1 approach. We may miss something or have a delayed diagnosis but I think it is reasonable to try to avoid some of the other testing to try a week of antibiotics and at least do a blood work including a CBC and basic metabolic to make sure that her potassium is okay and that she does not have a significant elevation in her white count.     If she has continuing issues after the antibiotic. We will try working her up with a CT scan and probable colonoscopy.        PLAN:     1.  cipro 500 mg bid x 7 days (allergic to flagyl)  2. Continue prilosec and pepcid for her GERD, she is doing well with that regimen  3. Plan CS screening at 50yo  4.   If pain recurs or persists then , would RTC to discuss work up with CT scan and CS.

## 2022-03-02 LAB
EKG ATRIAL RATE: 76 BPM
EKG P AXIS: 62 DEGREES
EKG P-R INTERVAL: 144 MS
EKG Q-T INTERVAL: 418 MS
EKG QRS DURATION: 86 MS
EKG QTC CALCULATION (BAZETT): 470 MS
EKG R AXIS: 7 DEGREES
EKG T AXIS: 22 DEGREES
EKG VENTRICULAR RATE: 76 BPM

## 2022-03-04 RX ORDER — LORATADINE 10 MG/1
10 TABLET ORAL NIGHTLY
COMMUNITY

## 2022-03-04 NOTE — PROGRESS NOTES
In preparation for their surgical procedure above patient was screened for Obstructive Sleep Apnea (RAUL) using the STOP-Bang Questionnaire by the Pre-Admission Testing department. This is a pre-surgical screening tool for patient safety and serves as a recommendation, this WILL NOT cause cancellation of surgery. STOP-Bang Questionnaire  * Do you currently see a pulmonologist?  No     If yes STOP, do not complete. Patient follows with Dr.     1.  Do you snore loudly (able to be heard in the next room)? No    2. Do you often feel tired or sleepy during the daytime? No       3. Has anyone ever told you that you stop breathing during your sleep? No    4. Do you have or are you being treated for high blood pressure? No      5. BMI more than 35? BMI (Calculated): 22.7        No    6. Age over 48 years? 36 y.o. No    7. Neck Circumference greater than 17 inches for male or 16 inches for female? Measured           (visits only)            Not Applicable    8. Gender Male? No      TOTAL SCORE: 0    RAUL - Low Risk : Yes to 0 - 2 questions  RAUL - Intermediate Risk : Yes to 3 - 4 questions  RAUL - High Risk : Yes to 5 - 8 questions    Adapted from:   STOP Questionnaire: A Tool to Screen Patients for Obstructive Sleep Apnea   [facility] [employer] Lynn Hunter, M.B.B.S., Sirisha Montano M.D., Ritu Pedersen. Codie Pretty, Ph.D., CLARA Sanchez.B.B.S., CLARA Wilson.Sc., Stacie Alvarado M.D., Brooke Boyle. LANDY Macedo.P.C.    Anesthesiology 2008; 232:147-57 Copyright 2008, the [address] [employer], [place].   ----------------------------------------------------------------------------------------------------------------

## 2022-03-04 NOTE — PROGRESS NOTES
PAT call attempted, patient unavailable, left message to please call us back at your earliest convenience; [phone]

## 2022-03-11 ENCOUNTER — HOSPITAL ENCOUNTER (OUTPATIENT)
Age: 42
Setting detail: OUTPATIENT SURGERY
Discharge: HOME | End: 2022-03-11
Attending: OBSTETRICS & GYNECOLOGY | Admitting: OBSTETRICS & GYNECOLOGY
Payer: COMMERCIAL

## 2022-03-11 ENCOUNTER — ANESTHESIA EVENT (OUTPATIENT)
Dept: OPERATING ROOM | Age: 42
End: 2022-03-11
Payer: COMMERCIAL

## 2022-03-11 ENCOUNTER — ANESTHESIA (OUTPATIENT)
Dept: OPERATING ROOM | Age: 42
End: 2022-03-11
Payer: COMMERCIAL

## 2022-03-11 VITALS
TEMPERATURE: 97.7 F | SYSTOLIC BLOOD PRESSURE: 144 MMHG | BODY MASS INDEX: 22.89 KG/M2 | DIASTOLIC BLOOD PRESSURE: 74 MMHG | HEIGHT: 63 IN | OXYGEN SATURATION: 94 % | HEART RATE: 83 BPM | WEIGHT: 129.19 LBS | RESPIRATION RATE: 18 BRPM

## 2022-03-11 VITALS
TEMPERATURE: 96.6 F | OXYGEN SATURATION: 100 % | SYSTOLIC BLOOD PRESSURE: 113 MMHG | RESPIRATION RATE: 11 BRPM | DIASTOLIC BLOOD PRESSURE: 70 MMHG

## 2022-03-11 DIAGNOSIS — Z90.710 S/P LAPAROSCOPIC HYSTERECTOMY: Primary | ICD-10-CM

## 2022-03-11 LAB — GLUCOSE BLD-MCNC: 125 MG/DL (ref 70–108)

## 2022-03-11 PROCEDURE — 6360000002 HC RX W HCPCS: Performed by: NURSE ANESTHETIST, CERTIFIED REGISTERED

## 2022-03-11 PROCEDURE — 2500000003 HC RX 250 WO HCPCS: Performed by: OBSTETRICS & GYNECOLOGY

## 2022-03-11 PROCEDURE — 3700000001 HC ADD 15 MINUTES (ANESTHESIA): Performed by: OBSTETRICS & GYNECOLOGY

## 2022-03-11 PROCEDURE — 3700000000 HC ANESTHESIA ATTENDED CARE: Performed by: OBSTETRICS & GYNECOLOGY

## 2022-03-11 PROCEDURE — 7100000011 HC PHASE II RECOVERY - ADDTL 15 MIN: Performed by: OBSTETRICS & GYNECOLOGY

## 2022-03-11 PROCEDURE — 3600000019 HC SURGERY ROBOT ADDTL 15MIN: Performed by: OBSTETRICS & GYNECOLOGY

## 2022-03-11 PROCEDURE — 2500000003 HC RX 250 WO HCPCS: Performed by: NURSE ANESTHETIST, CERTIFIED REGISTERED

## 2022-03-11 PROCEDURE — 3600000009 HC SURGERY ROBOT BASE: Performed by: OBSTETRICS & GYNECOLOGY

## 2022-03-11 PROCEDURE — 2580000003 HC RX 258: Performed by: NURSE ANESTHETIST, CERTIFIED REGISTERED

## 2022-03-11 PROCEDURE — 7100000001 HC PACU RECOVERY - ADDTL 15 MIN: Performed by: OBSTETRICS & GYNECOLOGY

## 2022-03-11 PROCEDURE — 6360000002 HC RX W HCPCS: Performed by: ANESTHESIOLOGY

## 2022-03-11 PROCEDURE — S2900 ROBOTIC SURGICAL SYSTEM: HCPCS | Performed by: OBSTETRICS & GYNECOLOGY

## 2022-03-11 PROCEDURE — 6360000002 HC RX W HCPCS

## 2022-03-11 PROCEDURE — 82948 REAGENT STRIP/BLOOD GLUCOSE: CPT

## 2022-03-11 PROCEDURE — 7100000010 HC PHASE II RECOVERY - FIRST 15 MIN: Performed by: OBSTETRICS & GYNECOLOGY

## 2022-03-11 PROCEDURE — 7100000000 HC PACU RECOVERY - FIRST 15 MIN: Performed by: OBSTETRICS & GYNECOLOGY

## 2022-03-11 PROCEDURE — 2709999900 HC NON-CHARGEABLE SUPPLY: Performed by: OBSTETRICS & GYNECOLOGY

## 2022-03-11 PROCEDURE — 6360000002 HC RX W HCPCS: Performed by: STUDENT IN AN ORGANIZED HEALTH CARE EDUCATION/TRAINING PROGRAM

## 2022-03-11 PROCEDURE — 2580000003 HC RX 258: Performed by: ANESTHESIOLOGY

## 2022-03-11 PROCEDURE — 88307 TISSUE EXAM BY PATHOLOGIST: CPT

## 2022-03-11 RX ORDER — KETOROLAC TROMETHAMINE 30 MG/ML
30 INJECTION, SOLUTION INTRAMUSCULAR; INTRAVENOUS EVERY 6 HOURS
Status: CANCELLED | OUTPATIENT
Start: 2022-03-11 | End: 2022-03-13

## 2022-03-11 RX ORDER — FENTANYL CITRATE 50 UG/ML
50 INJECTION, SOLUTION INTRAMUSCULAR; INTRAVENOUS EVERY 5 MIN PRN
Status: COMPLETED | OUTPATIENT
Start: 2022-03-11 | End: 2022-03-11

## 2022-03-11 RX ORDER — KETOROLAC TROMETHAMINE 30 MG/ML
INJECTION, SOLUTION INTRAMUSCULAR; INTRAVENOUS PRN
Status: DISCONTINUED | OUTPATIENT
Start: 2022-03-11 | End: 2022-03-11 | Stop reason: SDUPTHER

## 2022-03-11 RX ORDER — SODIUM CHLORIDE 9 MG/ML
25 INJECTION, SOLUTION INTRAVENOUS PRN
Status: DISCONTINUED | OUTPATIENT
Start: 2022-03-11 | End: 2022-03-11 | Stop reason: HOSPADM

## 2022-03-11 RX ORDER — ACETAMINOPHEN 325 MG/1
650 TABLET ORAL EVERY 4 HOURS PRN
Status: CANCELLED | OUTPATIENT
Start: 2022-03-11

## 2022-03-11 RX ORDER — SODIUM CHLORIDE, SODIUM LACTATE, POTASSIUM CHLORIDE, CALCIUM CHLORIDE 600; 310; 30; 20 MG/100ML; MG/100ML; MG/100ML; MG/100ML
INJECTION, SOLUTION INTRAVENOUS CONTINUOUS PRN
Status: DISCONTINUED | OUTPATIENT
Start: 2022-03-11 | End: 2022-03-11 | Stop reason: SDUPTHER

## 2022-03-11 RX ORDER — FENTANYL CITRATE 50 UG/ML
INJECTION, SOLUTION INTRAMUSCULAR; INTRAVENOUS PRN
Status: DISCONTINUED | OUTPATIENT
Start: 2022-03-11 | End: 2022-03-11 | Stop reason: SDUPTHER

## 2022-03-11 RX ORDER — BUPIVACAINE HYDROCHLORIDE 5 MG/ML
INJECTION, SOLUTION EPIDURAL; INTRACAUDAL PRN
Status: DISCONTINUED | OUTPATIENT
Start: 2022-03-11 | End: 2022-03-11 | Stop reason: ALTCHOICE

## 2022-03-11 RX ORDER — MIDAZOLAM HYDROCHLORIDE 1 MG/ML
INJECTION INTRAMUSCULAR; INTRAVENOUS PRN
Status: DISCONTINUED | OUTPATIENT
Start: 2022-03-11 | End: 2022-03-11 | Stop reason: SDUPTHER

## 2022-03-11 RX ORDER — SODIUM CHLORIDE 9 MG/ML
25 INJECTION, SOLUTION INTRAVENOUS PRN
Status: CANCELLED | OUTPATIENT
Start: 2022-03-11

## 2022-03-11 RX ORDER — SODIUM CHLORIDE 0.9 % (FLUSH) 0.9 %
5-40 SYRINGE (ML) INJECTION PRN
Status: CANCELLED | OUTPATIENT
Start: 2022-03-11

## 2022-03-11 RX ORDER — SODIUM CHLORIDE 0.9 % (FLUSH) 0.9 %
5-40 SYRINGE (ML) INJECTION EVERY 12 HOURS SCHEDULED
Status: CANCELLED | OUTPATIENT
Start: 2022-03-11

## 2022-03-11 RX ORDER — GLYCOPYRROLATE 1 MG/5 ML
SYRINGE (ML) INTRAVENOUS PRN
Status: DISCONTINUED | OUTPATIENT
Start: 2022-03-11 | End: 2022-03-11 | Stop reason: SDUPTHER

## 2022-03-11 RX ORDER — CLINDAMYCIN PHOSPHATE 150 MG/ML
INJECTION, SOLUTION INTRAVENOUS PRN
Status: DISCONTINUED | OUTPATIENT
Start: 2022-03-11 | End: 2022-03-11 | Stop reason: SDUPTHER

## 2022-03-11 RX ORDER — OXYCODONE HYDROCHLORIDE AND ACETAMINOPHEN 5; 325 MG/1; MG/1
1 TABLET ORAL EVERY 4 HOURS PRN
Status: CANCELLED | OUTPATIENT
Start: 2022-03-11

## 2022-03-11 RX ORDER — DEXTROSE, SODIUM CHLORIDE, SODIUM LACTATE, POTASSIUM CHLORIDE, AND CALCIUM CHLORIDE 5; .6; .31; .03; .02 G/100ML; G/100ML; G/100ML; G/100ML; G/100ML
INJECTION, SOLUTION INTRAVENOUS CONTINUOUS
Status: CANCELLED | OUTPATIENT
Start: 2022-03-11

## 2022-03-11 RX ORDER — KETOROLAC TROMETHAMINE 10 MG/1
10 TABLET, FILM COATED ORAL EVERY 6 HOURS PRN
Qty: 20 TABLET | Refills: 1 | Status: SHIPPED | OUTPATIENT
Start: 2022-03-11 | End: 2022-06-17 | Stop reason: ALTCHOICE

## 2022-03-11 RX ORDER — DROPERIDOL 2.5 MG/ML
0.62 INJECTION, SOLUTION INTRAMUSCULAR; INTRAVENOUS ONCE
Status: COMPLETED | OUTPATIENT
Start: 2022-03-11 | End: 2022-03-11

## 2022-03-11 RX ORDER — CLINDAMYCIN PHOSPHATE 900 MG/50ML
INJECTION INTRAVENOUS
Status: DISCONTINUED
Start: 2022-03-11 | End: 2022-03-11 | Stop reason: HOSPADM

## 2022-03-11 RX ORDER — ONDANSETRON 2 MG/ML
4 INJECTION INTRAMUSCULAR; INTRAVENOUS EVERY 6 HOURS PRN
Status: CANCELLED | OUTPATIENT
Start: 2022-03-11

## 2022-03-11 RX ORDER — LIDOCAINE HYDROCHLORIDE 20 MG/ML
INJECTION, SOLUTION INTRAVENOUS PRN
Status: DISCONTINUED | OUTPATIENT
Start: 2022-03-11 | End: 2022-03-11 | Stop reason: SDUPTHER

## 2022-03-11 RX ORDER — CLINDAMYCIN PHOSPHATE 900 MG/50ML
900 INJECTION INTRAVENOUS
Status: CANCELLED | OUTPATIENT
Start: 2022-03-11 | End: 2022-03-11

## 2022-03-11 RX ORDER — PROPOFOL 10 MG/ML
INJECTION, EMULSION INTRAVENOUS PRN
Status: DISCONTINUED | OUTPATIENT
Start: 2022-03-11 | End: 2022-03-11 | Stop reason: SDUPTHER

## 2022-03-11 RX ORDER — IBUPROFEN 400 MG/1
400 TABLET ORAL EVERY 6 HOURS PRN
Status: CANCELLED | OUTPATIENT
Start: 2022-03-11

## 2022-03-11 RX ORDER — GENTAMICIN SULFATE 40 MG/ML
INJECTION, SOLUTION INTRAMUSCULAR; INTRAVENOUS PRN
Status: DISCONTINUED | OUTPATIENT
Start: 2022-03-11 | End: 2022-03-11 | Stop reason: SDUPTHER

## 2022-03-11 RX ORDER — SODIUM CHLORIDE 9 MG/ML
INJECTION, SOLUTION INTRAVENOUS CONTINUOUS
Status: CANCELLED | OUTPATIENT
Start: 2022-03-11

## 2022-03-11 RX ORDER — OXYCODONE HYDROCHLORIDE AND ACETAMINOPHEN 5; 325 MG/1; MG/1
2 TABLET ORAL EVERY 4 HOURS PRN
Status: CANCELLED | OUTPATIENT
Start: 2022-03-11

## 2022-03-11 RX ORDER — DEXAMETHASONE SODIUM PHOSPHATE 10 MG/ML
INJECTION, EMULSION INTRAMUSCULAR; INTRAVENOUS PRN
Status: DISCONTINUED | OUTPATIENT
Start: 2022-03-11 | End: 2022-03-11 | Stop reason: SDUPTHER

## 2022-03-11 RX ORDER — SODIUM CHLORIDE 0.9 % (FLUSH) 0.9 %
5-40 SYRINGE (ML) INJECTION EVERY 12 HOURS SCHEDULED
Status: DISCONTINUED | OUTPATIENT
Start: 2022-03-11 | End: 2022-03-11 | Stop reason: HOSPADM

## 2022-03-11 RX ORDER — ROCURONIUM BROMIDE 10 MG/ML
INJECTION, SOLUTION INTRAVENOUS PRN
Status: DISCONTINUED | OUTPATIENT
Start: 2022-03-11 | End: 2022-03-11 | Stop reason: SDUPTHER

## 2022-03-11 RX ORDER — OXYCODONE HYDROCHLORIDE AND ACETAMINOPHEN 5; 325 MG/1; MG/1
1 TABLET ORAL ONCE
Status: DISCONTINUED | OUTPATIENT
Start: 2022-03-11 | End: 2022-03-11 | Stop reason: HOSPADM

## 2022-03-11 RX ORDER — ONDANSETRON 2 MG/ML
INJECTION INTRAMUSCULAR; INTRAVENOUS PRN
Status: DISCONTINUED | OUTPATIENT
Start: 2022-03-11 | End: 2022-03-11 | Stop reason: SDUPTHER

## 2022-03-11 RX ORDER — ONDANSETRON 2 MG/ML
INJECTION INTRAMUSCULAR; INTRAVENOUS
Status: COMPLETED
Start: 2022-03-11 | End: 2022-03-11

## 2022-03-11 RX ORDER — SODIUM CHLORIDE 0.9 % (FLUSH) 0.9 %
5-40 SYRINGE (ML) INJECTION PRN
Status: DISCONTINUED | OUTPATIENT
Start: 2022-03-11 | End: 2022-03-11 | Stop reason: HOSPADM

## 2022-03-11 RX ORDER — ONDANSETRON 4 MG/1
4 TABLET, ORALLY DISINTEGRATING ORAL EVERY 8 HOURS PRN
Status: CANCELLED | OUTPATIENT
Start: 2022-03-11

## 2022-03-11 RX ORDER — ONDANSETRON 4 MG/1
4 TABLET, ORALLY DISINTEGRATING ORAL EVERY 6 HOURS PRN
Qty: 40 TABLET | Refills: 3 | Status: SHIPPED | OUTPATIENT
Start: 2022-03-11 | End: 2022-06-17 | Stop reason: ALTCHOICE

## 2022-03-11 RX ORDER — ONDANSETRON 2 MG/ML
4 INJECTION INTRAMUSCULAR; INTRAVENOUS ONCE
Status: COMPLETED | OUTPATIENT
Start: 2022-03-11 | End: 2022-03-11

## 2022-03-11 RX ORDER — HYDROMORPHONE HCL 110MG/55ML
PATIENT CONTROLLED ANALGESIA SYRINGE INTRAVENOUS PRN
Status: DISCONTINUED | OUTPATIENT
Start: 2022-03-11 | End: 2022-03-11 | Stop reason: SDUPTHER

## 2022-03-11 RX ORDER — NEOSTIGMINE METHYLSULFATE 5 MG/5 ML
SYRINGE (ML) INTRAVENOUS PRN
Status: DISCONTINUED | OUTPATIENT
Start: 2022-03-11 | End: 2022-03-11 | Stop reason: SDUPTHER

## 2022-03-11 RX ORDER — OXYCODONE HYDROCHLORIDE AND ACETAMINOPHEN 5; 325 MG/1; MG/1
1 TABLET ORAL EVERY 6 HOURS PRN
Qty: 28 TABLET | Refills: 0 | Status: SHIPPED | OUTPATIENT
Start: 2022-03-11 | End: 2022-03-18

## 2022-03-11 RX ADMIN — HYDROMORPHONE HYDROCHLORIDE 0.25 MG: 2 INJECTION INTRAMUSCULAR; INTRAVENOUS; SUBCUTANEOUS at 14:20

## 2022-03-11 RX ADMIN — KETOROLAC TROMETHAMINE 30 MG: 30 INJECTION, SOLUTION INTRAMUSCULAR; INTRAVENOUS at 14:27

## 2022-03-11 RX ADMIN — HYDROMORPHONE HYDROCHLORIDE 0.25 MG: 2 INJECTION INTRAMUSCULAR; INTRAVENOUS; SUBCUTANEOUS at 13:43

## 2022-03-11 RX ADMIN — ONDANSETRON 4 MG: 2 INJECTION INTRAMUSCULAR; INTRAVENOUS at 15:03

## 2022-03-11 RX ADMIN — GENTAMICIN SULFATE 160 MG: 40 INJECTION, SOLUTION INTRAMUSCULAR; INTRAVENOUS at 13:05

## 2022-03-11 RX ADMIN — HYDROMORPHONE HYDROCHLORIDE 0.5 MG: 1 INJECTION, SOLUTION INTRAMUSCULAR; INTRAVENOUS; SUBCUTANEOUS at 15:34

## 2022-03-11 RX ADMIN — ONDANSETRON HYDROCHLORIDE 4 MG: 4 INJECTION, SOLUTION INTRAMUSCULAR; INTRAVENOUS at 12:50

## 2022-03-11 RX ADMIN — DEXAMETHASONE SODIUM PHOSPHATE 10 MG: 10 INJECTION, EMULSION INTRAMUSCULAR; INTRAVENOUS at 12:50

## 2022-03-11 RX ADMIN — LIDOCAINE HYDROCHLORIDE 100 MG: 20 INJECTION, SOLUTION INTRAVENOUS at 12:42

## 2022-03-11 RX ADMIN — Medication 0.4 MG: at 14:33

## 2022-03-11 RX ADMIN — FENTANYL CITRATE 50 MCG: 50 INJECTION INTRAMUSCULAR; INTRAVENOUS at 15:06

## 2022-03-11 RX ADMIN — HYDROMORPHONE HYDROCHLORIDE 0.5 MG: 1 INJECTION, SOLUTION INTRAMUSCULAR; INTRAVENOUS; SUBCUTANEOUS at 15:17

## 2022-03-11 RX ADMIN — HYDROMORPHONE HYDROCHLORIDE 0.5 MG: 1 INJECTION, SOLUTION INTRAMUSCULAR; INTRAVENOUS; SUBCUTANEOUS at 15:12

## 2022-03-11 RX ADMIN — MIDAZOLAM 2 MG: 1 INJECTION INTRAMUSCULAR; INTRAVENOUS at 12:35

## 2022-03-11 RX ADMIN — HYDROMORPHONE HYDROCHLORIDE 0.5 MG: 2 INJECTION INTRAMUSCULAR; INTRAVENOUS; SUBCUTANEOUS at 14:33

## 2022-03-11 RX ADMIN — SODIUM CHLORIDE 25 ML: 9 INJECTION, SOLUTION INTRAVENOUS at 12:22

## 2022-03-11 RX ADMIN — Medication 3.5 MG: at 14:33

## 2022-03-11 RX ADMIN — CLINDAMYCIN PHOSPHATE 900 MG: 150 INJECTION, SOLUTION INTRAVENOUS at 12:50

## 2022-03-11 RX ADMIN — FENTANYL CITRATE 50 MCG: 50 INJECTION INTRAMUSCULAR; INTRAVENOUS at 14:59

## 2022-03-11 RX ADMIN — SODIUM CHLORIDE, POTASSIUM CHLORIDE, SODIUM LACTATE AND CALCIUM CHLORIDE: 600; 310; 30; 20 INJECTION, SOLUTION INTRAVENOUS at 13:21

## 2022-03-11 RX ADMIN — ROCURONIUM BROMIDE 40 MG: 10 INJECTION INTRAVENOUS at 12:42

## 2022-03-11 RX ADMIN — PROPOFOL 150 MG: 10 INJECTION, EMULSION INTRAVENOUS at 12:42

## 2022-03-11 RX ADMIN — Medication 0.2 MG: at 13:05

## 2022-03-11 RX ADMIN — HYDROMORPHONE HYDROCHLORIDE 0.5 MG: 1 INJECTION, SOLUTION INTRAMUSCULAR; INTRAVENOUS; SUBCUTANEOUS at 15:28

## 2022-03-11 RX ADMIN — DROPERIDOL 0.62 MG: 2.5 INJECTION, SOLUTION INTRAMUSCULAR; INTRAVENOUS at 18:02

## 2022-03-11 RX ADMIN — HYDROMORPHONE HYDROCHLORIDE 0.25 MG: 2 INJECTION INTRAMUSCULAR; INTRAVENOUS; SUBCUTANEOUS at 13:56

## 2022-03-11 RX ADMIN — FENTANYL CITRATE 100 MCG: 50 INJECTION, SOLUTION INTRAMUSCULAR; INTRAVENOUS at 12:42

## 2022-03-11 ASSESSMENT — PULMONARY FUNCTION TESTS
PIF_VALUE: 15
PIF_VALUE: 15
PIF_VALUE: 25
PIF_VALUE: 27
PIF_VALUE: 26
PIF_VALUE: 25
PIF_VALUE: 7
PIF_VALUE: 27
PIF_VALUE: 20
PIF_VALUE: 26
PIF_VALUE: 25
PIF_VALUE: 4
PIF_VALUE: 14
PIF_VALUE: 0
PIF_VALUE: 23
PIF_VALUE: 7
PIF_VALUE: 27
PIF_VALUE: 27
PIF_VALUE: 26
PIF_VALUE: 20
PIF_VALUE: 0
PIF_VALUE: 4
PIF_VALUE: 26
PIF_VALUE: 27
PIF_VALUE: 25
PIF_VALUE: 25
PIF_VALUE: 26
PIF_VALUE: 18
PIF_VALUE: 27
PIF_VALUE: 27
PIF_VALUE: 1
PIF_VALUE: 26
PIF_VALUE: 22
PIF_VALUE: 26
PIF_VALUE: 27
PIF_VALUE: 27
PIF_VALUE: 26
PIF_VALUE: 1
PIF_VALUE: 26
PIF_VALUE: 15
PIF_VALUE: 27
PIF_VALUE: 26
PIF_VALUE: 27
PIF_VALUE: 15
PIF_VALUE: 14
PIF_VALUE: 27
PIF_VALUE: 26
PIF_VALUE: 15
PIF_VALUE: 15
PIF_VALUE: 14
PIF_VALUE: 26
PIF_VALUE: 27
PIF_VALUE: 27
PIF_VALUE: 19
PIF_VALUE: 12
PIF_VALUE: 26
PIF_VALUE: 26
PIF_VALUE: 27
PIF_VALUE: 1
PIF_VALUE: 6
PIF_VALUE: 26
PIF_VALUE: 27
PIF_VALUE: 15
PIF_VALUE: 27
PIF_VALUE: 26
PIF_VALUE: 27
PIF_VALUE: 6
PIF_VALUE: 3
PIF_VALUE: 26
PIF_VALUE: 2
PIF_VALUE: 26
PIF_VALUE: 24
PIF_VALUE: 5
PIF_VALUE: 27
PIF_VALUE: 4
PIF_VALUE: 26
PIF_VALUE: 27
PIF_VALUE: 25
PIF_VALUE: 14
PIF_VALUE: 26
PIF_VALUE: 17
PIF_VALUE: 26
PIF_VALUE: 27
PIF_VALUE: 20
PIF_VALUE: 26
PIF_VALUE: 26
PIF_VALUE: 3
PIF_VALUE: 12
PIF_VALUE: 3
PIF_VALUE: 27
PIF_VALUE: 16
PIF_VALUE: 25
PIF_VALUE: 26
PIF_VALUE: 25
PIF_VALUE: 16
PIF_VALUE: 0
PIF_VALUE: 27
PIF_VALUE: 22
PIF_VALUE: 26
PIF_VALUE: 27
PIF_VALUE: 17
PIF_VALUE: 26
PIF_VALUE: 26
PIF_VALUE: 20
PIF_VALUE: 14
PIF_VALUE: 3
PIF_VALUE: 3
PIF_VALUE: 12
PIF_VALUE: 27
PIF_VALUE: 0
PIF_VALUE: 27
PIF_VALUE: 26
PIF_VALUE: 6
PIF_VALUE: 25
PIF_VALUE: 26
PIF_VALUE: 25
PIF_VALUE: 13
PIF_VALUE: 27
PIF_VALUE: 27

## 2022-03-11 ASSESSMENT — PAIN DESCRIPTION - LOCATION
LOCATION: ABDOMEN
LOCATION: ABDOMEN

## 2022-03-11 ASSESSMENT — PAIN SCALES - GENERAL
PAINLEVEL_OUTOF10: 7
PAINLEVEL_OUTOF10: 7
PAINLEVEL_OUTOF10: 10
PAINLEVEL_OUTOF10: 8
PAINLEVEL_OUTOF10: 8
PAINLEVEL_OUTOF10: 7
PAINLEVEL_OUTOF10: 8
PAINLEVEL_OUTOF10: 7
PAINLEVEL_OUTOF10: 0

## 2022-03-11 ASSESSMENT — PAIN - FUNCTIONAL ASSESSMENT
PAIN_FUNCTIONAL_ASSESSMENT: 0-10
PAIN_FUNCTIONAL_ASSESSMENT: 0-10

## 2022-03-11 ASSESSMENT — LIFESTYLE VARIABLES: SMOKING_STATUS: 1

## 2022-03-11 ASSESSMENT — PAIN DESCRIPTION - PAIN TYPE
TYPE: SURGICAL PAIN
TYPE: SURGICAL PAIN

## 2022-03-11 NOTE — PROGRESS NOTES
Pt returned to [facility] room 20. Vitals and assessment as charted. 0.9 infusing, @950ml to count from PACU. Pt has crackers and water. Family at the bedside. Pt and family verbalized understanding of discharge criteria and call light use. Call light in reach.

## 2022-03-11 NOTE — ANESTHESIA POSTPROCEDURE EVALUATION
Department of Anesthesiology  Postprocedure Note    Patient: Nighat Ferrera  MRN: [id number]  YOB: 1980  Date of evaluation: 3/11/2022  Time:  4:06 PM     Procedure Summary     Date: 03/11/22 Room / Location: [place] [id number] / [place]    Anesthesia Start: 1236 Anesthesia Stop: 1409    Procedure: ROBOTIC ASSITED TOTAL LAPAROSCOPIC HYSTERECTOMY BILATERAL SALPINGECTOMY (N/A Uterus) Diagnosis: (Menorrhagia)    Surgeons: Volodymyr Britt MD Responsible Provider: Genie Borden DO    Anesthesia Type: general ASA Status: 2          Anesthesia Type: general    Phong Phase I: Phong Score: 9    Phong Phase II:      Last vitals: Reviewed and per EMR flowsheets.        Anesthesia Post Evaluation    Patient location during evaluation: PACU  Patient participation: complete - patient participated  Level of consciousness: awake  Airway patency: patent  Nausea & Vomiting: no nausea  Complications: no  Cardiovascular status: hemodynamically stable  Respiratory status: acceptable  Hydration status: stable

## 2022-03-11 NOTE — ANESTHESIA PRE PROCEDURE
Department of Anesthesiology  Preprocedure Note       Name:  Jaylene Bell   Age:  36 y.o.  :  1980                                          MRN:  [id number]         Date:  3/11/2022      Surgeon: Bakari Lopes):  Any Langford MD    Procedure: Procedure(s):  ROBOTIC ASSITED TOTAL LAPAROSCOPIC HYSTERECTOMY BILATERAL SALPINGECTOMY POSSIBLE BILATERAL OOPHORECTOMY    Medications prior to admission:   Prior to Admission medications    Medication Sig Start Date End Date Taking? Authorizing Provider   loratadine (CLARITIN) 10 MG tablet Take 10 mg by mouth nightly   Yes Historical Provider, MD   esomeprazole (NEXIUM) 40 MG delayed release capsule TAKE 1 CAPSULE DAILY 22  Yes Sathya Knapp MD   ondansetron [facility]) 4 MG tablet Take 1 tablet by mouth every 6 hours as needed for Nausea or Vomiting 22  Yes Yuridia Tolliver MD   norethindrone-ethinyl estradiol (MARGI) 0.5-35 MG-MCG per tablet take 1 tablet by mouth daily for 21 DAYS SKIP TH E PLACEBO TABLETS AND START NEW PACK 22  Yes Simone Eason DO   famotidine (PEPCID) 20 MG tablet Take 1 tablet by mouth nightly 21  Yes Sathya Knapp MD   dicyclomine (BENTYL) 10 MG capsule Take 1 capsule by mouth 4 times daily as needed (for lower GI symptoms) 21  Yes Sathya Knapp MD   NONFORMULARY Take 1 capsule by mouth THRIVE capsule, nutritional shake and patch   Yes Historical Provider, MD   montelukast (SINGULAIR) 10 MG tablet nightly  20  Yes Historical Provider, MD   fluticasone (FLONASE) 50 MCG/ACT nasal spray 2 sprays to each nostril once daily. 19  Yes DEAN Valera - CNP       Current medications:    No current facility-administered medications for this encounter. Allergies:     Allergies   Allergen Reactions    Latex Dermatitis    Amoxil [Amoxicillin] Diarrhea    Bactrim [Sulfamethoxazole-Trimethoprim] Itching    Cantaloupe (Diagnostic) Swelling     Tongue swells    Clindamycin/Lincomycin Nausea And Vomiting    Flagyl [Metronidazole] Other (See Comments)     Sees bugs crawling    Biaxin [Clarithromycin] Nausea And Vomiting    Ceftin [Cefuroxime Axetil] Diarrhea and Nausea And Vomiting    Keflex [Cephalexin] Nausea And Vomiting       Problem List:    Patient Active Problem List   Diagnosis Code    Hyperlipidemia E78.5    Tobacco use Z72.0    Vertigo, benign paroxysmal H81.10    Type 2 diabetes mellitus without complication, without long-term current use of insulin (HCC) E11.9    LPRD (laryngopharyngeal reflux disease) K21.9    Seasonal allergic rhinitis due to pollen J30.1    Sinus problem J34.9       Past Medical History:        Diagnosis Date    Allergic rhinitis     COVID 12/27/2021    Diabetes mellitus (HCC)     diet control    GERD (gastroesophageal reflux disease)     Hemorrhoids     Hyperlipidemia     Hypoglycemia     Kidney stones     Prolonged emergence from general anesthesia     Tobacco abuse     UTI (urinary tract infection)     frequency       Past Surgical History:        Procedure Laterality Date    CARPAL TUNNEL RELEASE Left     COLONOSCOPY N/A 3/1/2022    COLONOSCOPY performed by Poncho Kennedy MD at [facility] OTHER SURGICAL HISTORY Right 09/07/2016    laser ureteroscopic lithotripsy    [place] EGD TRANSORAL BIOPSY SINGLE/MULTIPLE N/A 3/13/2017    EGD BIOPSY performed by Poncho Kennedy MD at [address] [place] [address] Bilateral     approximately 2013    TUBAL LIGATION      UPPER GASTROINTESTINAL ENDOSCOPY  2010    hiatal hernia     UPPER GASTROINTESTINAL ENDOSCOPY  03/13/2017    mild gastritis, Dr. Rhiannon Yang N/A 3/28/2019    EGD BIOPSY W/48 HR GASCA performed by Poncho Kennedy MD at [facility] OR.mild inflammation and gastric polyps       Social History:    Social History     Tobacco Use    Smoking status: Smoker, Current Status Unknown     Packs/day: 0.50     Years: 10.00     Pack years: 5.00 Start date: 2012    Smokeless tobacco: Never Used    Tobacco comment: vaping   Substance Use Topics    Alcohol use: Not Currently     Comment: rare                                Ready to quit: Not Answered  Counseling given: Not Answered  Comment: vaping      Vital Signs (Current):   Vitals:    03/04/22 1420 03/11/22 1135 03/11/22 1144   BP:  (!) 137/91    Pulse:  89    Resp:  16    Temp:  98.4 °F (36.9 °C)    TempSrc:  Temporal    SpO2:  99%    Weight: 128 lb (58.1 kg)  129 lb 3 oz (58.6 kg)   Height: 5' 3\" (1.6 m)  5' 3\" (1.6 m)                                              BP Readings from Last 3 Encounters:   03/11/22 (!) 137/91   03/01/22 139/67   03/01/22 (!) 157/95       NPO Status:                                                                                 BMI:   Wt Readings from Last 3 Encounters:   03/11/22 129 lb 3 oz (58.6 kg)   03/01/22 127 lb (57.6 kg)   02/17/22 132 lb (59.9 kg)     Body mass index is 22.88 kg/m². CBC:   Lab Results   Component Value Date    WBC 8.5 02/28/2022    RBC 3.99 02/28/2022    HGB 13.4 02/28/2022    HCT 38.9 02/28/2022    MCV 97.5 02/28/2022    RDW 12.2 02/28/2022     02/28/2022       CMP:   Lab Results   Component Value Date     02/28/2022    K 4.2 02/28/2022     02/28/2022    CO2 27 02/28/2022    BUN 12 02/28/2022    CREATININE 0.71 02/28/2022    GFRAA >60 02/28/2022    LABGLOM >60 02/28/2022    GLUCOSE 120 02/28/2022    PROT 8.3 02/17/2022    CALCIUM 9.4 02/28/2022    BILITOT 0.46 02/17/2022    ALKPHOS 46 02/17/2022    AST 24 02/17/2022    ALT 31 02/17/2022       POC Tests: No results for input(s): POCGLU, POCNA, POCK, POCCL, POCBUN, POCHEMO, POCHCT in the last 72 hours.     Coags: No results found for: PROTIME, INR, APTT    HCG (If Applicable):   Lab Results   Component Value Date    PREGTESTUR NEGATIVE 02/17/2022    HCG NEGATIVE 03/01/2022        ABGs: No results found for: PHART, PO2ART, VLG0ZIT, HBF2RVC, BEART, T8TGGQRQ     Type & Screen (If Applicable):  No results found for: LABABO, [address]    Drug/Infectious Status (If Applicable):  Lab Results   Component Value Date    HEPCAB NONREACTIVE 07/18/2016       COVID-19 Screening (If Applicable):   Lab Results   Component Value Date    COVID19 DETECTED 12/27/2021           Anesthesia Evaluation  Patient summary reviewed  Airway: Mallampati: II  TM distance: >3 FB   Neck ROM: full  Mouth opening: > = 3 FB Dental:          Pulmonary:   (+) current smoker          Patient smoked on day of surgery. Cardiovascular:                      Neuro/Psych:               GI/Hepatic/Renal:   (+) GERD:,           Endo/Other:    (+) Diabetes, . Abdominal:             Vascular: Other Findings:             Anesthesia Plan      general     ASA 2       Induction: intravenous. MIPS: Postoperative opioids intended and Prophylactic antiemetics administered. Anesthetic plan and risks discussed with patient. Plan discussed with DK Ladd.  [facility]   3/11/2022

## 2022-03-11 NOTE — H&P
H&P Update    Patient's History and Physical from my office was reviewed. Patient examined. There has been no change.     Simon Steve MD

## 2022-03-11 NOTE — OP NOTE
[address] [place] [address]                                OPERATIVE REPORT    PATIENT NAME: Sacha Mueller                      :        1980  MED REC NO:   [id number]                           ROOM:  ACCOUNT NO:   [de-identified]                           ADMIT DATE: 2022  PROVIDER:     Kathy Rajput M.D.    Jay Raider:  2022    PREOPERATIVE DIAGNOSES:  Menorrhagia, failed Katherine endometrial  ablation. POSTOPERATIVE DIAGNOSES:  Menorrhagia, failed Katherine endometrial  ablation. PROCEDURE:  Robotically-assisted total laparoscopic hysterectomy with  bilateral salpingectomy. SURGEON:  Barber Rajput MD    ASSISTANT SURGEON:  _____, third year OB/GYN resident from [place]. ANESTHESIA:  General.    ANESTHESIOLOGIST:  Tameka Ferrell CRNA    EBL:  50 mL. PATHOLOGY SPECIMENS:  Uterus, cervix, and bilateral tubes. OPERATIVE PROCEDURE:  The patient was taken to the operating room, at  which time she underwent general anesthetic. She was placed in dorsal  lithotomy position, sterilely prepped and draped in the usual manner. A  scalpel was used to make a supraumbilical stab incision and this was  elevated and a Veress needle introduced. Proper positioning was  confirmed by a drop-test.  Once approximately 4.5 L of CO2 was  instilled, the Veress needle was removed and a trocar placed. Proper  positioning was reconfirmed by the camera. The remaining laparoscopic  ports were then placed without complication under direct visualization. The large VCare manipulator was placed into the uterus and a Carmona was  placed to straight drain. The robot was then brought in and docked. The hot napoleon were placed in the right hand and the Force bipolar in  the left. Attention was then turned to the console.   On inspection,  bilateral ovaries were noted to be normal.  The left tube was then  grasped at the fimbriated area, the mesosalpinx was then thoroughly  cauterized all the way to the cornua. The left round ligament was then  thoroughly cauterized and transected, opening up the retroperitoneal  space. The left ureter was identified through the peritoneum well onto  the operative field. The utero-ovarian suspensory ligament was then  thoroughly cauterized and transected, preserving the left ovary. The  posterior leaf of the broad ligament was taken down to the uterosacral.   Anterior leaf was then taken down towards the VCare cup. The right tube  was then grasped at the fimbriated area. The mesosalpinx was then  thoroughly cauterized all the way to the cornua. The right round  ligament was then thoroughly cauterized and transected, opening up the  retroperitoneal space on that side. The uterine-ovarian suspensory  ligament on the right was then thoroughly cauterized and transected,  preserving the right ovary. Posterior leaf of the broad ligament was  taken to the uterosacral.  The anterior leaf was then taken to meet the  prior incision. Meticulous dissection then ensued, dissecting the  bladder off of the VCare cup and upper vagina. Bilateral uterine  vessels were then skeletonized. Bilateral uterine vessels were then  cauterized high on the specimen, again at the level of the VCare cup. These were then transected bilaterally. Another bite along the top of  the VCare cup was then taken to ensure proper hemostasis. An inverted U  incision was then made around the vessels, dissecting them completely  off of the VCare cup. Once the cup was circumferentially visualized, a  colpotomy was made posteriorly between the uterosacrals and these were  then brought around anteriorly, truncating the specimen. The specimen  was then removed into the vagina.   The vaginal cuff was then closed with  a running 2-0 V-Loc stitch, starting at the right uterosacral, sewing to  the left, and coming back slightly to the midline. A second deeper  imbricated stitch was then placed for cuff support and protection,  starting at the left uterosacral and terminating at the right. The  pelvis was then irrigated with copious amounts of sterile saline. All  surgical sites were inspected and found to be hemostatic. The robot was  then undocked. The gas was allowed to escape. Camera and ports were  removed together. The incisions were then closed with buried  interrupted 4-0 Vicryl and had Marcaine injected in each as well as  Steri-Strips and Band-Aids applied. The Carmona was removed prior to  this. She was then undraped, cleaned of any prep, and was transferred  to the cart, and taken to recovery room in stable condition.         Elida Booker M.D.    D: 03/11/2022 15:01:30       T: 03/11/2022 16:32:59     [email or web address]  Job#: [id number]     Doc#: [id number]    CC:

## 2022-03-11 NOTE — PROGRESS NOTES
1449: Pt arrives to pacu, awakens to verbal stimuli and quickly drifts back to sleep. Pt on room air, respirations easy and unlabored. VSS  1459: Pt awakens on her own and c/o pain 10/10, 50mcg of fentanyl administered. VSS  1503: Pt c/o nausea, 4mg of zofran administered. VSS  1506: Pt states pain 8/10, 50mcg of fentanyl given. 1512: No change in pain, 0.5mg of dilaudid administered. 1517: No change, 0.5mg of dilaudid given   1528: States pain 7/10, 0.5mg of dilaudid administered. VSS  1534: No change in pain, 0.5mg of dilaudid given. VSS  1540: Pt sleeping at this time, easily awakens to voice. Pain 4/10 and tolerable. VSS  1545: Pt provided with ice chips, tolerated well. VSS  1601: Pt meets criteria for discharge from pacu  Pt transported back to [facility] in stable condition.  Report given to The Danette RN

## 2022-03-12 NOTE — PROGRESS NOTES

## 2022-03-29 ENCOUNTER — TELEPHONE (OUTPATIENT)
Dept: SURGERY | Age: 42
End: 2022-03-29

## 2022-03-29 NOTE — LETTER
[facility]  [address], [place]  Phone [phone]  Fax [phone]      Dr. Oswaldo Carrillo    3/29/2022    Dear Balaji Morales,    Dr. Chacon Comes reviewed the results of your recent Colonoscopy on 3/1/2022, which was normal.    His recommendations are to be scoped again in 10 years for screening. If you have any questions or concerns regarding your test results or our recommendations, please call the office at [phone].     Sincerely,      Wilbert Galicia RN

## 2022-03-30 ENCOUNTER — PATIENT MESSAGE (OUTPATIENT)
Dept: FAMILY MEDICINE CLINIC | Age: 42
End: 2022-03-30

## 2022-03-30 NOTE — TELEPHONE ENCOUNTER
From: Armando Adler  To: Dr. Flavia Springerumber: 3/30/2022 6:33 AM EDT  Subject: Dallin Meyer, can i just change me appointment out later? Its been a rough year so far and I've missed alot of work. I had a hysterectomy a few weeks ago and will just be going back next week and i don't want to miss work already. I looked at my test results from my bloodwork. Since I've been sick i haven't been eating very well so i gotta get back to it. I think im okay on prescriptions except i don't have any diflucan left. It's been awhile since guzman needed it but like i said since others were providing my food while I've been down i haven't done keto. Please let me know.  Thanks  Balaji Morales

## 2022-06-10 ENCOUNTER — TELEPHONE (OUTPATIENT)
Dept: FAMILY MEDICINE CLINIC | Age: 42
End: 2022-06-10

## 2022-06-10 DIAGNOSIS — E78.5 HYPERLIPIDEMIA, UNSPECIFIED HYPERLIPIDEMIA TYPE: ICD-10-CM

## 2022-06-10 DIAGNOSIS — E11.9 TYPE 2 DIABETES MELLITUS WITHOUT COMPLICATION, WITHOUT LONG-TERM CURRENT USE OF INSULIN (HCC): Primary | ICD-10-CM

## 2022-06-10 NOTE — TELEPHONE ENCOUNTER
PT would like some labs done and also to have a tsh added and she states she would like to have them done on Saturday. Pt would like a call back when orders are placed before she arrives to have them done. Apparently pt states she was suppose to have labs ordered but in her chart their are no lab orders.

## 2022-06-11 ENCOUNTER — HOSPITAL ENCOUNTER (OUTPATIENT)
Dept: LAB | Age: 42
Discharge: HOME | End: 2022-06-11
Payer: COMMERCIAL

## 2022-06-11 DIAGNOSIS — E78.5 HYPERLIPIDEMIA, UNSPECIFIED HYPERLIPIDEMIA TYPE: ICD-10-CM

## 2022-06-11 DIAGNOSIS — E11.9 TYPE 2 DIABETES MELLITUS WITHOUT COMPLICATION, WITHOUT LONG-TERM CURRENT USE OF INSULIN (HCC): ICD-10-CM

## 2022-06-11 LAB
ALBUMIN SERPL-MCNC: 4.6 G/DL (ref 3.5–5.2)
ALBUMIN/GLOBULIN RATIO: 1.9 (ref 1–2.5)
ALP BLD-CCNC: 52 U/L (ref 35–104)
ALT SERPL-CCNC: 27 U/L (ref 5–33)
ANION GAP SERPL CALCULATED.3IONS-SCNC: 10 MMOL/L (ref 9–17)
AST SERPL-CCNC: 18 U/L
BILIRUB SERPL-MCNC: 0.35 MG/DL (ref 0.3–1.2)
BUN BLDV-MCNC: 14 MG/DL (ref 6–20)
BUN/CREAT BLD: 25 (ref 9–20)
CALCIUM SERPL-MCNC: 9.2 MG/DL (ref 8.6–10.4)
CHLORIDE BLD-SCNC: 103 MMOL/L (ref 98–107)
CHOLESTEROL/HDL RATIO: 4.3
CHOLESTEROL: 185 MG/DL
CO2: 26 MMOL/L (ref 20–31)
CREAT SERPL-MCNC: 0.56 MG/DL (ref 0.5–0.9)
GFR AFRICAN AMERICAN: >60 ML/MIN
GFR NON-AFRICAN AMERICAN: >60 ML/MIN
GFR SERPL CREATININE-BSD FRML MDRD: ABNORMAL ML/MIN/{1.73_M2}
GLUCOSE BLD-MCNC: 185 MG/DL (ref 70–99)
HDLC SERPL-MCNC: 43 MG/DL
LDL CHOLESTEROL: 123 MG/DL (ref 0–130)
POTASSIUM SERPL-SCNC: 3.9 MMOL/L (ref 3.7–5.3)
SODIUM BLD-SCNC: 139 MMOL/L (ref 135–144)
TOTAL PROTEIN: 7 G/DL (ref 6.4–8.3)
TRIGL SERPL-MCNC: 94 MG/DL
TSH SERPL DL<=0.05 MIU/L-ACNC: 1.23 UIU/ML (ref 0.3–5)

## 2022-06-11 PROCEDURE — 80053 COMPREHEN METABOLIC PANEL: CPT

## 2022-06-11 PROCEDURE — 36415 COLL VENOUS BLD VENIPUNCTURE: CPT

## 2022-06-11 PROCEDURE — 83036 HEMOGLOBIN GLYCOSYLATED A1C: CPT

## 2022-06-11 PROCEDURE — 84443 ASSAY THYROID STIM HORMONE: CPT

## 2022-06-11 PROCEDURE — 80061 LIPID PANEL: CPT

## 2022-06-12 LAB
ESTIMATED AVERAGE GLUCOSE: 157 MG/DL
HBA1C MFR BLD: 7.1 % (ref 4–6)

## 2022-06-17 ENCOUNTER — OFFICE VISIT (OUTPATIENT)
Dept: FAMILY MEDICINE CLINIC | Age: 42
End: 2022-06-17
Payer: COMMERCIAL

## 2022-06-17 VITALS
HEIGHT: 63 IN | BODY MASS INDEX: 23.11 KG/M2 | TEMPERATURE: 97.9 F | SYSTOLIC BLOOD PRESSURE: 120 MMHG | OXYGEN SATURATION: 98 % | HEART RATE: 98 BPM | WEIGHT: 130.4 LBS | DIASTOLIC BLOOD PRESSURE: 78 MMHG

## 2022-06-17 DIAGNOSIS — E78.5 HYPERLIPIDEMIA, UNSPECIFIED HYPERLIPIDEMIA TYPE: ICD-10-CM

## 2022-06-17 DIAGNOSIS — Z12.31 SCREENING MAMMOGRAM FOR BREAST CANCER: ICD-10-CM

## 2022-06-17 DIAGNOSIS — E11.9 DIET-CONTROLLED DIABETES MELLITUS (HCC): Primary | ICD-10-CM

## 2022-06-17 DIAGNOSIS — J30.1 CHRONIC SEASONAL ALLERGIC RHINITIS DUE TO POLLEN: ICD-10-CM

## 2022-06-17 DIAGNOSIS — R39.89 BLADDER PAIN: ICD-10-CM

## 2022-06-17 PROBLEM — B37.31 CANDIDIASIS OF VAGINA: Status: ACTIVE | Noted: 2020-02-03

## 2022-06-17 PROCEDURE — 99214 OFFICE O/P EST MOD 30 MIN: CPT | Performed by: FAMILY MEDICINE

## 2022-06-17 PROCEDURE — 3051F HG A1C>EQUAL 7.0%<8.0%: CPT | Performed by: FAMILY MEDICINE

## 2022-06-17 ASSESSMENT — ENCOUNTER SYMPTOMS
BLOOD IN STOOL: 0
COUGH: 0
SHORTNESS OF BREATH: 0

## 2022-06-17 NOTE — PROGRESS NOTES
[facility] [address]  [address], [address] [place] [address]  [phone]      Emily Figueroa is a 36 y.o. female who presents today for her medical conditions/complaints as noted below. Tierney Peres is c/o of Diabetes and Discuss Labs      HPI:     Pt here today for follow-up of DM and review of labs. Reviewed results with pt during OV today - A1 decreased from 7.5% to 7.1%; LP is now at goal; TSH normal; CMP normal, other than glucose 185 and BUN/Cr ratio 25. Pt has been eating healthier again and trying to get back to keto diet. Not currently on any DM meds - will continue with diet/exercise control. Had been taking Myrbetriq for bladder irritation after her hysterectomy - took this for a couple months, which did help (per her OB-Gyn Dr. Danyel Lopez). Pt then tried to refill after having tried samples, but it was expensive. He switched her to [facility], but she has not taken this due to listed side effects. Bladder irritation is better, so feels that she is OK without medication right now. Taking Nexium 40 mg daily and Pepcid 20 mg nightly for GERD - stable; also takes Bentyl 10 mg as needed for abdominal cramping. Last doses were earlier this week after graduation parties last weekend. Takes this maybe every couple months. Using Singulair 10 mg nightly, Claritin 10 mg nightly, and Flonase 1 spray b/l daily for allergic rhinitis - stable. Still using Thrive capsules, shakes and patches daily. Had hysterectomy with Dr. Malik Diggs on 3/11 - total hysterectomy with b/l salpingectomy; b/l ovaries remain.             Past Medical History:   Diagnosis Date    Allergic rhinitis     COVID 12/27/2021    Diabetes mellitus (HCC)     diet control    GERD (gastroesophageal reflux disease)     GERD (gastroesophageal reflux disease)     Hemorrhoids     Hyperlipidemia     Hypoglycemia     Kidney stones     Prolonged emergence from general anesthesia     Tobacco abuse     UTI (urinary tract infection)     frequency      Past Surgical History:   Procedure Laterality Date    CARPAL TUNNEL RELEASE Left     COLONOSCOPY N/A 2022    normal, Dr. Jabier Molina at [facility] ([facility]) N/A 2022    ROBOTIC ASSITED TOTAL LAPAROSCOPIC HYSTERECTOMY BILATERAL SALPINGECTOMY performed by Vandana Fishman MD at [facility] OTHER SURGICAL HISTORY Right 2016    laser ureteroscopic lithotripsy    OK EGD TRANSORAL BIOPSY SINGLE/MULTIPLE N/A 2017    EGD BIOPSY performed by Disha Hoover MD at [address] [place] [address] Bilateral     approximately    [facility] UPPER GASTROINTESTINAL ENDOSCOPY      hiatal hernia     UPPER GASTROINTESTINAL ENDOSCOPY  2017    mild gastritis, Dr. Maryjane Velázquez N/A 2019    EGD BIOPSY W/48 HR GASCA performed by Disha Hoover MD at [address] [facility] OR.mild inflammation and gastric polyps     Family History   Problem Relation Age of Onset    High Blood Pressure Mother     Stroke Mother     Thyroid Disease Mother     Diabetes Father     High Blood Pressure Father     Cancer Father         Bladder     Social History     Tobacco Use    Smoking status: Former Smoker     Packs/day: 0.50     Years: 10.00     Pack years: 5.00     Types: Cigarettes     Start date:      Quit date: 2020     Years since quittin.6    Smokeless tobacco: Never Used    Tobacco comment: vaping   Substance Use Topics    Alcohol use: Not Currently     Comment: rare      Current Outpatient Medications   Medication Sig Dispense Refill    loratadine (CLARITIN) 10 MG tablet Take 10 mg by mouth nightly      esomeprazole (NEXIUM) 40 MG delayed release capsule TAKE 1 CAPSULE DAILY 90 capsule 3    famotidine (PEPCID) 20 MG tablet Take 1 tablet by mouth nightly 60 tablet 3    dicyclomine (BENTYL) 10 MG capsule Take 1 capsule by mouth 4 times daily as needed (for lower GI symptoms) 120 capsule 3    NONFORMULARY Take 1 capsule by mouth THRIVE capsule, nutritional shake and patch      montelukast (SINGULAIR) 10 MG tablet nightly       fluticasone (FLONASE) 50 MCG/ACT nasal spray 2 sprays to each nostril once daily. 3 Bottle 3     No current facility-administered medications for this visit. Allergies   Allergen Reactions    Latex Dermatitis    Amoxil [Amoxicillin] Diarrhea    Bactrim [Sulfamethoxazole-Trimethoprim] Itching    Cantaloupe (Diagnostic) Swelling     Tongue swells    Clindamycin/Lincomycin Nausea And Vomiting    Flagyl [Metronidazole] Other (See Comments)     Sees bugs crawling    Biaxin [Clarithromycin] Nausea And Vomiting    Ceftin [Cefuroxime Axetil] Diarrhea and Nausea And Vomiting    Keflex [Cephalexin] Nausea And Vomiting       Health Maintenance   Topic Date Due    COVID-19 Vaccine (1) Never done    Pneumococcal 0-64 years Vaccine (1 - PCV) Never done    Hepatitis B vaccine (1 of 3 - Risk 3-dose series) Never done    DTaP/Tdap/Td vaccine (1 - Tdap) Never done    Diabetic retinal exam  01/06/2021    Diabetic foot exam  07/27/2022    Flu vaccine (Season Ended) 09/01/2022    Diabetic microalbuminuria test  01/31/2023    Depression Screen  02/16/2023    A1C test (Diabetic or Prediabetic)  06/11/2023    Lipids  06/11/2023    Cervical cancer screen  02/01/2025    Colorectal Cancer Screen  03/01/2032    Hepatitis C screen  Completed    HIV screen  Completed    Hepatitis A vaccine  Aged Out    Hib vaccine  Aged Out    Meningococcal (ACWY) vaccine  Aged Out       Subjective:      Review of Systems   Constitutional: Negative for fever. HENT: Negative for ear pain (feels \"full\") and hearing loss. Respiratory: Negative for cough and shortness of breath. Cardiovascular: Negative for chest pain and palpitations. Gastrointestinal: Negative for blood in stool.    Genitourinary: Negative for dysuria, hematuria and menstrual problem. Neurological: Positive for dizziness (had episode last night). Psychiatric/Behavioral: Negative for dysphoric mood and suicidal ideas. Objective:     Vitals:    06/17/22 0846   BP: 120/78   Site: Right Upper Arm   Position: Sitting   Cuff Size: Medium Adult   Pulse: 98   Temp: 97.9 °F (36.6 °C)   TempSrc: Temporal   SpO2: 98%   Weight: 130 lb 6.4 oz (59.1 kg)   Height: 5' 3\" (1.6 m)     Physical Exam  Vitals and nursing note reviewed. Constitutional:       General: She is not in acute distress. Appearance: She is well-developed. HENT:      Head: Normocephalic and atraumatic. Right Ear: Tympanic membrane, ear canal and external ear normal.      Left Ear: Tympanic membrane, ear canal and external ear normal.      Nose: Nose normal.      Mouth/Throat:      Mouth: Mucous membranes are moist.      Pharynx: Oropharynx is clear. No posterior oropharyngeal erythema. Eyes:      Conjunctiva/sclera: Conjunctivae normal.   Cardiovascular:      Rate and Rhythm: Normal rate and regular rhythm. Heart sounds: Normal heart sounds. Pulmonary:      Effort: Pulmonary effort is normal. No respiratory distress. Breath sounds: Normal breath sounds. Abdominal:      General: Bowel sounds are normal. There is no distension. Palpations: Abdomen is soft. Tenderness: There is no abdominal tenderness. Musculoskeletal:      Cervical back: Neck supple. Skin:     General: Skin is warm and dry. Neurological:      Mental Status: She is alert and oriented to person, place, and time. Assessment:      1. Diet-controlled diabetes mellitus ([address]  -     Hemoglobin A1C; Future  2. Chronic seasonal allergic rhinitis due to pollen  3. Hyperlipidemia, unspecified hyperlipidemia type  4. Bladder pain  5. Screening mammogram for breast cancer  -     [facility] ROM DIGITAL SCREEN BILATERAL; Future         Plan:       Will re-check A1c again in 6 months; TSH, CMP, and LP again in 1 year. Return in about 6 months (around 12/17/2022) for f/u DM, labs. Orders Placed This Encounter   Procedures    [facility] ROM DIGITAL SCREEN BILATERAL     Standing Status:   Future     Standing Expiration Date:   8/17/2023     Order Specific Question:   Reason for exam:     Answer:   screening for breast cancer    Hemoglobin A1C     Standing Status:   Future     Standing Expiration Date:   6/17/2023     No orders of the defined types were placed in this encounter. Patient given educational materials - see patient instructions. Discussed use, benefit, and side effects of prescribed medications. All patient questions answered. Pt voiced understanding. Reviewed health maintenance.             Electronically signed by Sherice Rubio DO, DO on 6/27/2022 at 12:03 AM

## 2022-07-23 ENCOUNTER — HOSPITAL ENCOUNTER (OUTPATIENT)
Dept: MAMMOGRAPHY | Age: 42
Discharge: HOME | End: 2022-07-25
Payer: COMMERCIAL

## 2022-07-23 DIAGNOSIS — Z12.31 SCREENING MAMMOGRAM FOR BREAST CANCER: ICD-10-CM

## 2022-07-23 PROCEDURE — 77063 BREAST TOMOSYNTHESIS BI: CPT

## 2022-07-29 DIAGNOSIS — Z12.31 SCREENING MAMMOGRAM, ENCOUNTER FOR: Primary | ICD-10-CM

## 2022-08-10 DIAGNOSIS — B37.31 RECURRENT CANDIDIASIS OF VAGINA: ICD-10-CM

## 2022-08-10 NOTE — TELEPHONE ENCOUNTER
Per MyChart message:  I get yeast infections frequently, [place] said probably worse because of my diabetes. I didn't think i needed any refills when i was in (she gave me some to have on hand prior) because i was doing so well with my keto. It is the only thing that works for me nothing over the counter works. Emily called requesting a refill of the below medication which has been pended for you:     Requested Prescriptions     Pending Prescriptions Disp Refills    fluconazole (DIFLUCAN) 150 MG tablet 6 tablet 3     Sig: Take one daily now and repeat dose in 4 days.        Last Appointment Date: 6/17/2022  Next Appointment Date: 12/30/2022    Allergies   Allergen Reactions    Latex Dermatitis    Amoxil [Amoxicillin] Diarrhea    Bactrim [Sulfamethoxazole-Trimethoprim] Itching    Cantaloupe (Diagnostic) Swelling     Tongue swells    Clindamycin/Lincomycin Nausea And Vomiting    Flagyl [Metronidazole] Other (See Comments)     Sees bugs crawling    Biaxin [Clarithromycin] Nausea And Vomiting    Ceftin [Cefuroxime Axetil] Diarrhea and Nausea And Vomiting    Keflex [Cephalexin] Nausea And Vomiting

## 2022-08-11 RX ORDER — FLUCONAZOLE 150 MG/1
TABLET ORAL
Qty: 2 TABLET | Refills: 3 | Status: SHIPPED | OUTPATIENT
Start: 2022-08-11

## 2022-08-29 ENCOUNTER — OFFICE VISIT (OUTPATIENT)
Dept: PRIMARY CARE CLINIC | Age: 42
End: 2022-08-29
Payer: COMMERCIAL

## 2022-08-29 VITALS
WEIGHT: 134.4 LBS | BODY MASS INDEX: 23.81 KG/M2 | SYSTOLIC BLOOD PRESSURE: 130 MMHG | OXYGEN SATURATION: 97 % | DIASTOLIC BLOOD PRESSURE: 94 MMHG | HEIGHT: 63 IN | HEART RATE: 96 BPM | TEMPERATURE: 98.4 F

## 2022-08-29 DIAGNOSIS — H60.12 CELLULITIS OF LEFT EAR: Primary | ICD-10-CM

## 2022-08-29 PROCEDURE — 99213 OFFICE O/P EST LOW 20 MIN: CPT | Performed by: NURSE PRACTITIONER

## 2022-08-29 RX ORDER — DOXYCYCLINE HYCLATE 100 MG
100 TABLET ORAL 2 TIMES DAILY
Qty: 20 TABLET | Refills: 0 | Status: SHIPPED | OUTPATIENT
Start: 2022-08-29 | End: 2022-09-08

## 2022-08-29 ASSESSMENT — ENCOUNTER SYMPTOMS
ABDOMINAL PAIN: 0
DIARRHEA: 0
SORE THROAT: 0
RHINORRHEA: 0
COUGH: 0
VOMITING: 0

## 2022-08-29 NOTE — PROGRESS NOTES
MG tablet Take 1 tablet by mouth nightly 60 tablet 3    dicyclomine (BENTYL) 10 MG capsule Take 1 capsule by mouth 4 times daily as needed (for lower GI symptoms) 120 capsule 3    NONFORMULARY Take 1 capsule by mouth THRIVE capsule, nutritional shake and patch      montelukast (SINGULAIR) 10 MG tablet nightly       fluticasone (FLONASE) 50 MCG/ACT nasal spray 2 sprays to each nostril once daily. 3 Bottle 3    fluconazole (DIFLUCAN) 150 MG tablet Take one daily now and repeat dose in 4 days. (Patient not taking: Reported on 8/29/2022) 2 tablet 3     No current facility-administered medications for this visit. She is allergic to latex, amoxil [amoxicillin], bactrim [sulfamethoxazole-trimethoprim], cantaloupe (diagnostic), clindamycin/lincomycin, flagyl [metronidazole], biaxin [clarithromycin], ceftin [cefuroxime axetil], and keflex [cephalexin]. .    She  reports that she quit smoking about 21 months ago. Her smoking use included cigarettes. She started smoking about 10 years ago. She has a 5.00 pack-year smoking history. She has never used smokeless tobacco.      Objective:    Vitals:    08/29/22 1758   BP: (!) 130/94   Pulse: 96   Temp: 98.4 °F (36.9 °C)   TempSrc: Tympanic   SpO2: 97%   Weight: 134 lb 6.4 oz (61 kg)   Height: 5' 3\" (1.6 m)     Body mass index is 23.81 kg/m². Review of Systems   HENT:  Positive for ear pain. Negative for ear discharge, hearing loss, rhinorrhea and sore throat. Respiratory:  Negative for cough. Gastrointestinal:  Negative for abdominal pain, diarrhea and vomiting. Skin:  Negative for rash. Neurological:  Negative for headaches. Physical Exam  Vitals and nursing note reviewed. Constitutional:       Appearance: Normal appearance. She is well-developed. HENT:      Head: Normocephalic. Jaw: There is normal jaw occlusion.       Right Ear: Tympanic membrane, ear canal and external ear normal.      Left Ear: Tympanic membrane and ear canal normal.      Ears: Mouth/Throat:      Lips: Pink. Mouth: Mucous membranes are moist.      Pharynx: Oropharynx is clear. Uvula midline. Eyes:      Pupils: Pupils are equal, round, and reactive to light. Cardiovascular:      Rate and Rhythm: Normal rate and regular rhythm. Heart sounds: Normal heart sounds. Pulmonary:      Effort: Pulmonary effort is normal.      Breath sounds: Normal breath sounds and air entry. Musculoskeletal:      Cervical back: Normal range of motion and neck supple. Lymphadenopathy:      Cervical: No cervical adenopathy. Skin:     General: Skin is warm and dry. Neurological:      General: No focal deficit present. Mental Status: She is alert and oriented to person, place, and time. Psychiatric:         Behavior: Behavior normal.         Thought Content: Thought content normal.       Assessment and Plan:    No results found for this visit on 08/29/22. Diagnosis Orders   1. Cellulitis of left ear  doxycycline hyclate (VIBRA-TABS) 100 MG tablet        Complete full course of antibiotic. Warm compress to area for 15 minutes 2-3 times daily. Take Tylenol as needed for pain. Avoid picking at the area. Follow up with PCP if symptoms persist or worsen. The use, risks, benefits, and side effects of prescribed or recommended medications were discussed. All questions were answered and the patient/caregiver voiced understanding. No orders of the defined types were placed in this encounter.         Electronically signed by DEAN Gupta CNP on 8/29/22 at 6:05 PM EDT

## 2022-08-31 DIAGNOSIS — H60.12 CELLULITIS OF LEFT EAR: ICD-10-CM

## 2022-09-04 RX ORDER — DOXYCYCLINE HYCLATE 100 MG
TABLET ORAL
Qty: 20 TABLET | Refills: 0 | OUTPATIENT
Start: 2022-09-04

## 2022-10-12 ENCOUNTER — OFFICE VISIT (OUTPATIENT)
Dept: PRIMARY CARE CLINIC | Age: 42
End: 2022-10-12
Payer: COMMERCIAL

## 2022-10-12 VITALS
HEART RATE: 75 BPM | BODY MASS INDEX: 23.81 KG/M2 | DIASTOLIC BLOOD PRESSURE: 70 MMHG | SYSTOLIC BLOOD PRESSURE: 112 MMHG | TEMPERATURE: 97.7 F | RESPIRATION RATE: 18 BRPM | HEIGHT: 63 IN | WEIGHT: 134.38 LBS | OXYGEN SATURATION: 98 %

## 2022-10-12 DIAGNOSIS — H60.12 CELLULITIS OF EAR, LEFT: Primary | ICD-10-CM

## 2022-10-12 PROCEDURE — 99213 OFFICE O/P EST LOW 20 MIN: CPT | Performed by: NURSE PRACTITIONER

## 2022-10-12 RX ORDER — DOXYCYCLINE HYCLATE 100 MG
100 TABLET ORAL 2 TIMES DAILY
Qty: 20 TABLET | Refills: 0 | Status: SHIPPED | OUTPATIENT
Start: 2022-10-12 | End: 2022-10-22

## 2022-10-12 ASSESSMENT — ENCOUNTER SYMPTOMS
SORE THROAT: 0
SINUS PRESSURE: 1
RHINORRHEA: 0
VOMITING: 0
DIARRHEA: 0
RESPIRATORY NEGATIVE: 1
COUGH: 0

## 2022-10-12 NOTE — PROGRESS NOTES
[facility]             [address], [address]                        Telephone [phone]             Fax [phone]     [facility]  1980  KKV:[id number]   Date of visit:  10/12/2022    Subjective:    Toney [facility] Keanu is a 36 y.o.  female who presents to [facility] today (10/12/2022) for evaluation of:    Chief Complaint   Patient presents with    Otalgia     Left ear pain. Was seen here 8/29/2022 for cellulitis of left ear. Was prescribed antibiotics, started to take them, got covid, then stopped taking them as she was told to not take many meds while having covid. Both ears draining. Symptoms never fully resolved. Ringing in right ear at times. Scratches them often. Would like refill of flonase. Otalgia   There is pain in the left ear. This is a recurrent problem. The current episode started more than 1 month ago. The problem occurs constantly. The problem has been gradually worsening. There has been no fever. The pain is at a severity of 6/10. Associated symptoms include headaches (mild, intermittent). Pertinent negatives include no coughing, diarrhea, ear discharge, hearing loss, rash, rhinorrhea, sore throat or vomiting. Associated symptoms comments: External left ear pain; postnasal drainage; sinus pressure; Treated for cellulitis of left ear on 08/29/22 with doxycycline, she only took half prescribed dose due to having Covid-19, then completed the other half of doxycycline about 3 weeks ago. . She has tried acetaminophen (claritin, flonase, singulair) for the symptoms. The treatment provided no relief.      She has the following problem list:  Patient Active Problem List   Diagnosis    Hyperlipidemia    Tobacco use    Vertigo, benign paroxysmal    Type 2 diabetes mellitus without complication, without long-term current use of insulin ([facility])    LPRD (laryngopharyngeal reflux disease) sinus pressure. Negative for congestion, ear discharge, hearing loss, rhinorrhea and sore throat. Respiratory: Negative. Negative for cough. Cardiovascular: Negative. Gastrointestinal:  Negative for diarrhea and vomiting. Skin:  Negative for rash. Neurological:  Positive for headaches (mild, intermittent). Physical Exam  Vitals and nursing note reviewed. Constitutional:       Appearance: Normal appearance. She is well-developed. HENT:      Head: Normocephalic. Jaw: There is normal jaw occlusion. Right Ear: Tympanic membrane, ear canal and external ear normal.      Left Ear: Tympanic membrane and ear canal normal.      Ears:        Mouth/Throat:      Lips: Pink. Mouth: Mucous membranes are moist.      Pharynx: Oropharynx is clear. Uvula midline. Eyes:      Pupils: Pupils are equal, round, and reactive to light. Cardiovascular:      Rate and Rhythm: Normal rate and regular rhythm. Heart sounds: Normal heart sounds. Pulmonary:      Effort: Pulmonary effort is normal.      Breath sounds: Normal breath sounds and air entry. Musculoskeletal:      Cervical back: Normal range of motion and neck supple. Lymphadenopathy:      Cervical: No cervical adenopathy. Skin:     General: Skin is warm and dry. Neurological:      General: No focal deficit present. Mental Status: She is alert and oriented to person, place, and time. Psychiatric:         Behavior: Behavior normal.         Thought Content: Thought content normal.     Assessment and Plan:    No results found for this visit on 10/12/22. Diagnosis Orders   1. Cellulitis of ear, left  doxycycline hyclate (VIBRA-TABS) 100 MG tablet        Complete full course of antibiotic. Warm compress. Take Tylenol as needed for pain. Follow up with PCP if symptoms persist or worsen. The use, risks, benefits, and side effects of prescribed or recommended medications were discussed.  All questions were answered and the patient/caregiver voiced understanding. No orders of the defined types were placed in this encounter.         Electronically signed by DEAN Irving CNP on 10/12/22 at 6:22 PM EDT

## 2022-10-15 ENCOUNTER — PATIENT MESSAGE (OUTPATIENT)
Dept: FAMILY MEDICINE CLINIC | Age: 42
End: 2022-10-15

## 2022-10-17 NOTE — TELEPHONE ENCOUNTER
From: Crystal Quiroz  To: Dr. Nilda Lama: 10/15/2022 11:36 AM EDT  Subject: Kevin George    I went to urgent care this past week and i got an antibiotic u told them i needed a,refill in my flonase and they said okay but forgot to send it to the pharmacy. Can u refill it for me? I have 1 left and its juan old so i don't think its working as good.    Thanks  Ben Biswas

## 2022-10-26 ENCOUNTER — HOSPITAL ENCOUNTER (EMERGENCY)
Age: 42
Discharge: HOME | End: 2022-10-26
Attending: EMERGENCY MEDICINE
Payer: COMMERCIAL

## 2022-10-26 ENCOUNTER — APPOINTMENT (OUTPATIENT)
Dept: CT IMAGING | Age: 42
End: 2022-10-26
Payer: COMMERCIAL

## 2022-10-26 VITALS
SYSTOLIC BLOOD PRESSURE: 140 MMHG | OXYGEN SATURATION: 96 % | RESPIRATION RATE: 16 BRPM | DIASTOLIC BLOOD PRESSURE: 96 MMHG | HEART RATE: 93 BPM | TEMPERATURE: 98.5 F

## 2022-10-26 DIAGNOSIS — R10.9 RIGHT FLANK PAIN: Primary | ICD-10-CM

## 2022-10-26 LAB
BACTERIA: NORMAL
BILIRUBIN URINE: NEGATIVE
EPITHELIAL CELLS UA: NORMAL /HPF (ref 0–5)
GLUCOSE URINE: NEGATIVE
KETONES, URINE: NEGATIVE
LEUKOCYTE ESTERASE, URINE: NEGATIVE
NITRITE, URINE: NEGATIVE
PH UA: 6 (ref 5–6)
PROTEIN UA: NEGATIVE
RBC UA: NORMAL /HPF (ref 0–4)
SPECIFIC GRAVITY UA: 1.01 (ref 1.01–1.02)
URINE HGB: NEGATIVE
UROBILINOGEN, URINE: NORMAL
WBC UA: NORMAL /HPF (ref 0–4)

## 2022-10-26 PROCEDURE — 99284 EMERGENCY DEPT VISIT MOD MDM: CPT

## 2022-10-26 PROCEDURE — 6360000002 HC RX W HCPCS: Performed by: EMERGENCY MEDICINE

## 2022-10-26 PROCEDURE — 96375 TX/PRO/DX INJ NEW DRUG ADDON: CPT

## 2022-10-26 PROCEDURE — 81001 URINALYSIS AUTO W/SCOPE: CPT

## 2022-10-26 PROCEDURE — 74176 CT ABD & PELVIS W/O CONTRAST: CPT

## 2022-10-26 PROCEDURE — 96374 THER/PROPH/DIAG INJ IV PUSH: CPT

## 2022-10-26 PROCEDURE — 2580000003 HC RX 258: Performed by: EMERGENCY MEDICINE

## 2022-10-26 RX ORDER — KETOROLAC TROMETHAMINE 30 MG/ML
15 INJECTION, SOLUTION INTRAMUSCULAR; INTRAVENOUS ONCE
Status: COMPLETED | OUTPATIENT
Start: 2022-10-26 | End: 2022-10-26

## 2022-10-26 RX ORDER — 0.9 % SODIUM CHLORIDE 0.9 %
500 INTRAVENOUS SOLUTION INTRAVENOUS ONCE
Status: COMPLETED | OUTPATIENT
Start: 2022-10-26 | End: 2022-10-26

## 2022-10-26 RX ORDER — OXYCODONE HYDROCHLORIDE AND ACETAMINOPHEN 5; 325 MG/1; MG/1
1 TABLET ORAL EVERY 6 HOURS PRN
Qty: 12 TABLET | Refills: 0 | Status: SHIPPED | OUTPATIENT
Start: 2022-10-26 | End: 2022-10-29

## 2022-10-26 RX ORDER — ONDANSETRON 2 MG/ML
4 INJECTION INTRAMUSCULAR; INTRAVENOUS ONCE
Status: COMPLETED | OUTPATIENT
Start: 2022-10-26 | End: 2022-10-26

## 2022-10-26 RX ADMIN — ONDANSETRON 4 MG: 2 INJECTION INTRAMUSCULAR; INTRAVENOUS at 15:22

## 2022-10-26 RX ADMIN — KETOROLAC TROMETHAMINE 15 MG: 30 INJECTION, SOLUTION INTRAMUSCULAR at 15:23

## 2022-10-26 RX ADMIN — SODIUM CHLORIDE 500 ML: 9 INJECTION, SOLUTION INTRAVENOUS at 15:25

## 2022-10-26 ASSESSMENT — PAIN - FUNCTIONAL ASSESSMENT: PAIN_FUNCTIONAL_ASSESSMENT: 0-10

## 2022-10-26 ASSESSMENT — PAIN DESCRIPTION - LOCATION
LOCATION: FLANK

## 2022-10-26 ASSESSMENT — PAIN SCALES - GENERAL
PAINLEVEL_OUTOF10: 4

## 2022-10-26 ASSESSMENT — PAIN DESCRIPTION - DESCRIPTORS: DESCRIPTORS: ACHING

## 2022-10-26 ASSESSMENT — PAIN DESCRIPTION - ORIENTATION: ORIENTATION: RIGHT

## 2022-10-26 NOTE — LETTER
[facility]  [address]  Phone: [phone]               October 26, 2022    Patient: Jessie Benoit   YOB: 1980   Date of Visit: 10/26/2022       To Whom It May Concern:    Shawn Messer was seen and treated in our emergency department on 10/26/2022. She may return to work on 10/27/22.       Sincerely,       Esequiel Pryor MD         Signature:__________________________________

## 2022-10-26 NOTE — ED PROVIDER NOTES
[address]      Pt Name: Lorena Brooks  MRN: [id number]  [place] 1980  Date of evaluation: 10/26/2022      CHIEF COMPLAINT       Chief Complaint   Patient presents with    Flank Pain     Pain today at work, went pee and began having excruciating pain. HISTORY OF PRESENT ILLNESS      The patient presents with right flank pain. She says she urinated at work today and then suddenly had pain in the flank. She has had kidney stones before. She has had to have them removed surgically in the past.  The patient denies fever. She denies hematuria. The patient also reports that she has had left ear discomfort. Several weeks ago she had an external ear infection and was taking drops for this. She says she still feels like there might be fluid in her ear. She has not had drainage however. The patient reports no vomiting but does feel little nauseated. She denies sore throat or nasal congestion. REVIEW OF SYSTEMS       All systems reviewed and negative unless noted in HPI. The patient denies fever or constitutional symptoms. Denies vision change. Denies any sore throat or rhinorrhea. Reports left ear discomfort. Denies any neck pain or stiffness. Denies chest pain or shortness of breath. Nausea without vomiting. Right flank pain. Denies any dysuria. History of kidney stones. Denies musculoskeletal injury or pain. Denies any weakness, numbness or focal neurologic deficit. Denies any skin rash or edema. No recent psychiatric issues. No easy bruising or bleeding. Denies any polyuria, polydypsia or history of immunocompromise.        PAST MEDICAL HISTORY    has a past medical history of Allergic rhinitis, COVID, Diabetes mellitus ([address] GERD (gastroesophageal reflux disease), GERD (gastroesophageal reflux disease), Hemorrhoids, Hyperlipidemia, Hypoglycemia, Kidney stones, Prolonged emergence from general anesthesia, Tobacco abuse, and UTI (urinary tract infection). SURGICAL HISTORY      has a past surgical history that includes Lithotripsy; Tubal ligation; Endometrial ablation; Carpal tunnel release (Left); Refractive surgery (Bilateral); other surgical history (Right, 09/07/2016); pr egd transoral biopsy single/multiple (N/A, 03/13/2017); Upper gastrointestinal endoscopy (2010); Upper gastrointestinal endoscopy (03/13/2017); Nasal septum surgery; Upper gastrointestinal endoscopy (N/A, 03/28/2019); Colonoscopy (N/A, 03/01/2022); and Hysterectomy (N/A, 03/11/2022). CURRENT MEDICATIONS       Previous Medications    DICYCLOMINE (BENTYL) 10 MG CAPSULE    Take 1 capsule by mouth 4 times daily as needed (for lower GI symptoms)    ESOMEPRAZOLE (NEXIUM) 40 MG DELAYED RELEASE CAPSULE    TAKE 1 CAPSULE DAILY    FAMOTIDINE (PEPCID) 20 MG TABLET    Take 1 tablet by mouth nightly    FLUCONAZOLE (DIFLUCAN) 150 MG TABLET    Take one daily now and repeat dose in 4 days. FLUTICASONE (FLONASE) 50 MCG/ACT NASAL SPRAY    2 sprays to each nostril once daily. LORATADINE (CLARITIN) 10 MG TABLET    Take 10 mg by mouth nightly    MONTELUKAST (SINGULAIR) 10 MG TABLET    nightly     NONFORMULARY    Take 1 capsule by mouth THRIVE capsule, nutritional shake and patch       ALLERGIES     is allergic to latex, amoxil [amoxicillin], bactrim [sulfamethoxazole-trimethoprim], cantaloupe (diagnostic), clindamycin/lincomycin, flagyl [metronidazole], biaxin [clarithromycin], ceftin [cefuroxime axetil], and keflex [cephalexin]. FAMILY HISTORY     She indicated that her mother is alive. She indicated that her father is alive. family history includes Cancer in her father; Diabetes in her father; High Blood Pressure in her father and mother; Stroke in her mother; Thyroid Disease in her mother. SOCIAL HISTORY      reports that she quit smoking about 1 years ago. Her smoking use included cigarettes. She started smoking about 10 years ago.  She has a 5.00 pack-year smoking history. She has never used smokeless tobacco. She reports that she does not currently use alcohol. She reports that she does not use drugs. PHYSICAL EXAM     INITIAL VITALS:  tympanic temperature is 98.5 °F (36.9 °C). Her blood pressure is 170/106 (abnormal) and her pulse is 93. Her respiration is 17 and oxygen saturation is 97%. The patient is alert and oriented, in distress due to pain. HEENT is atraumatic. Pupils are PERRL at 4 mm. No nystagmus. Mucous membranes moist.  TMs negative bilaterally. No external narrowing or redness of the canals. Neck is supple. Heart sounds regular rate and rhythm with no gallops, murmurs, or rubs. Lungs clear, no wheezes, rales or rhonchi. Abdomen: soft, nontender with no pain to palpation. CVA tenderness on the right only. Musculoskeletal exam shows no evidence of trauma. Normal distal pulses in all extremities. Skin: no rash or edema. Neurological exam reveals cranial nerves 2 through 12 grossly intact. Ambulates without difficulty. Psychiatric: Appropriate. Lymphatics.:  No lymphadenopathy. DIFFERENTIAL DIAGNOSIS/ MDM:     Renal colic, UTI, otitis media, otitis externa    DIAGNOSTIC RESULTS       RADIOLOGY:   I reviewed the radiologist interpretations:  CT ABDOMEN PELVIS WO CONTRAST Additional Contrast? None   Final Result   No acute abdominopelvic process. Bilateral nephrolithiasis without   hydronephrosis. CT ABDOMEN PELVIS WO CONTRAST Additional Contrast? None (Final result)  Result time 10/26/22 16:24:48  Final result by Amanda Berger MD (10/26/22 16:24:48)                Impression:    No acute abdominopelvic process. Bilateral nephrolithiasis without   hydronephrosis.              Narrative:    EXAMINATION:   CT OF THE ABDOMEN AND PELVIS WITHOUT CONTRAST 10/26/2022 3:42 pm     TECHNIQUE:   CT of the abdomen and pelvis was performed without the administration of   intravenous contrast. Multiplanar reformatted images are provided for review. Automated exposure control, iterative reconstruction, and/or weight based   adjustment of the mA/kV was utilized to reduce the radiation dose to as low   as reasonably achievable. COMPARISON:   02/01/2022     HISTORY:   ORDERING SYSTEM PROVIDED HISTORY: right flank pain   TECHNOLOGIST PROVIDED HISTORY:   right flank pain     Decision Support Exception - unselect if not a suspected or confirmed   emergency medical condition->Emergency Medical Condition (MA)   Is the patient pregnant?->No   Reason for Exam: right flank pain, history of kidney stones     FINDINGS:   Evaluation of the solid and hollow abdominal viscera is limited without   intravenous contrast administration. Lower Chest: Within normal limits. Organs: Small nonobstructing renal calculi bilaterally. No hydronephrosis or   hydroureter. Otherwise unremarkable. GI/Bowel: Probable constipation. Normal appendix. Pelvis: Probable partial hysterectomy. No urinary bladder calculi. Peritoneum/Retroperitoneum: Within normal limits. Bones/Soft Tissues: Within normal limits.                      LABS:  Results for orders placed or performed during the hospital encounter of 10/26/22   Urinalysis with Reflex to Culture    Specimen: Urine, clean catch   Result Value Ref Range    Glucose, Ur NEGATIVE NEGATIVE    Bilirubin Urine NEGATIVE NEGATIVE    Ketones, Urine NEGATIVE NEGATIVE    Specific Allen, UA 1.010 1.010 - 1.025    Urine Hgb NEGATIVE NEGATIVE    pH, UA 6.0 5.0 - 6.0    Protein, UA NEGATIVE NEGATIVE    Urobilinogen, Urine Normal Normal    Nitrite, Urine NEGATIVE NEGATIVE    Leukocyte Esterase, Urine NEGATIVE NEGATIVE   Microscopic Urinalysis   Result Value Ref Range    WBC, UA 0 TO 4 0 - 4 /HPF    RBC, UA 0 TO 4 0 - 4 /HPF    Epithelial Cells UA 0 TO 4 0 - 5 /HPF    Bacteria, UA None None         EMERGENCY DEPARTMENT COURSE:   Vitals:    Vitals:    10/26/22 1514 10/26/22 1518   BP: (!) 170/106 Pulse: 93    Resp: 17    Temp:  98.5 °F (36.9 °C)   TempSrc:  Tympanic   SpO2: 97%      -------------------------  BP: (!) 170/106, Temp: 98.5 °F (36.9 °C), Heart Rate: 93, Resp: 17      Re-evaluation Notes    The patient's labs and imaging are reassuring. It may be that she had a small stone that passed quickly but there is no evidence of UTI or urinary stone. The patient will be written for medicine for pain. She is discharged in good condition. Controlled Substance Monitoring:    Acute and Chronic Pain Monitoring:   RX Monitoring 7/22/2020   Attestation -   Periodic Controlled Substance Monitoring No signs of potential drug abuse or diversion identified. FINAL IMPRESSION      1. Right flank pain          DISPOSITION/PLAN   DISPOSITION Decision To Discharge 10/26/2022 04:41:57 PM      Condition on Disposition    good    PATIENT REFERRED TO:  Marifer Cervantes, DO  [address] [place] [address]  [phone]    In 3 days      DISCHARGE MEDICATIONS:  New Prescriptions    OXYCODONE-ACETAMINOPHEN (PERCOCET) 5-325 MG PER TABLET    Take 1 tablet by mouth every 6 hours as needed for Pain for up to 3 days. Intended supply: 3 days.  Take lowest dose possible to manage pain       (Please note that portions of this note were completed with a voice recognition program.  Efforts were made to edit the dictations but occasionally words are mis-transcribed.)    Marianne Xiong MD,, MD   Attending Emergency Physician         Marie Reynoso MD  10/26/22 [id number]

## 2022-10-27 DIAGNOSIS — J30.1 CHRONIC SEASONAL ALLERGIC RHINITIS DUE TO POLLEN: ICD-10-CM

## 2022-10-27 RX ORDER — FLUTICASONE PROPIONATE 50 MCG
1-2 SPRAY, SUSPENSION (ML) NASAL DAILY
Qty: 3 EACH | Refills: 3 | Status: SHIPPED | OUTPATIENT
Start: 2022-10-27

## 2022-10-27 NOTE — TELEPHONE ENCOUNTER
Patient asking for a refill. [facility] called requesting a refill of the below medication which has been pended for you:     Requested Prescriptions     Pending Prescriptions Disp Refills    fluticasone (FLONASE) 50 MCG/ACT nasal spray 3 each 3     Si sprays to each nostril once daily.        Last Appointment Date: 2022  Next Appointment Date: 2022    Allergies   Allergen Reactions    Latex Dermatitis    Amoxil [Amoxicillin] Diarrhea    Bactrim [Sulfamethoxazole-Trimethoprim] Itching    Cantaloupe (Diagnostic) Swelling     Tongue swells    Clindamycin/Lincomycin Nausea And Vomiting    Flagyl [Metronidazole] Other (See Comments)     Sees bugs crawling    Biaxin [Clarithromycin] Nausea And Vomiting    Ceftin [Cefuroxime Axetil] Diarrhea and Nausea And Vomiting    Keflex [Cephalexin] Nausea And Vomiting

## 2022-11-05 ENCOUNTER — APPOINTMENT (OUTPATIENT)
Dept: CT IMAGING | Age: 42
End: 2022-11-05
Payer: COMMERCIAL

## 2022-11-05 ENCOUNTER — HOSPITAL ENCOUNTER (EMERGENCY)
Age: 42
Discharge: HOME | End: 2022-11-06
Attending: EMERGENCY MEDICINE
Payer: COMMERCIAL

## 2022-11-05 VITALS
SYSTOLIC BLOOD PRESSURE: 147 MMHG | WEIGHT: 130 LBS | HEART RATE: 109 BPM | HEIGHT: 63 IN | BODY MASS INDEX: 23.04 KG/M2 | TEMPERATURE: 99.1 F | OXYGEN SATURATION: 97 % | RESPIRATION RATE: 18 BRPM | DIASTOLIC BLOOD PRESSURE: 90 MMHG

## 2022-11-05 DIAGNOSIS — H60.393 INFECTIVE OTITIS EXTERNA OF BOTH EARS: Primary | ICD-10-CM

## 2022-11-05 LAB
ABSOLUTE EOS #: 0 K/UL (ref 0–0.4)
ABSOLUTE IMMATURE GRANULOCYTE: 0.14 K/UL (ref 0–0.3)
ABSOLUTE LYMPH #: 0.14 K/UL (ref 1–4.8)
ABSOLUTE MONO #: 0 K/UL (ref 0.1–1.2)
BASOPHILS # BLD: 0 % (ref 0–1)
BASOPHILS ABSOLUTE: 0 K/UL (ref 0–0.2)
EOSINOPHILS RELATIVE PERCENT: 0 % (ref 1–7)
HCT VFR BLD CALC: 40.3 % (ref 36.3–47.1)
HEMOGLOBIN: 14 G/DL (ref 11.9–15.1)
IMMATURE GRANULOCYTES: 1 %
LYMPHOCYTES # BLD: 1 % (ref 16–46)
MCH RBC QN AUTO: 32.9 PG (ref 25.2–33.5)
MCHC RBC AUTO-ENTMCNC: 34.7 G/DL (ref 25.2–33.5)
MCV RBC AUTO: 94.6 FL (ref 82.6–102.9)
MONOCYTES # BLD: 0 % (ref 4–11)
MORPHOLOGY: ABNORMAL
MORPHOLOGY: ABNORMAL
NRBC AUTOMATED: 0 PER 100 WBC
PDW BLD-RTO: 12.1 % (ref 11.8–14.4)
PLATELET # BLD: 212 K/UL (ref 138–453)
PMV BLD AUTO: 10.7 FL (ref 8.1–13.5)
RBC # BLD: 4.26 M/UL (ref 3.95–5.11)
SEDIMENTATION RATE, ERYTHROCYTE: 45 MM/HR (ref 0–20)
SEG NEUTROPHILS: 98 % (ref 43–77)
SEGMENTED NEUTROPHILS ABSOLUTE COUNT: 13.62 K/UL (ref 1.5–8.1)
WBC # BLD: 13.9 K/UL (ref 3.5–11.3)

## 2022-11-05 PROCEDURE — 99284 EMERGENCY DEPT VISIT MOD MDM: CPT

## 2022-11-05 PROCEDURE — 6360000002 HC RX W HCPCS: Performed by: EMERGENCY MEDICINE

## 2022-11-05 PROCEDURE — 96372 THER/PROPH/DIAG INJ SC/IM: CPT

## 2022-11-05 PROCEDURE — 36415 COLL VENOUS BLD VENIPUNCTURE: CPT

## 2022-11-05 PROCEDURE — 85025 COMPLETE CBC W/AUTO DIFF WBC: CPT

## 2022-11-05 PROCEDURE — 70486 CT MAXILLOFACIAL W/O DYE: CPT

## 2022-11-05 PROCEDURE — 85652 RBC SED RATE AUTOMATED: CPT

## 2022-11-05 PROCEDURE — 6370000000 HC RX 637 (ALT 250 FOR IP): Performed by: EMERGENCY MEDICINE

## 2022-11-05 RX ORDER — CIPROFLOXACIN AND DEXAMETHASONE 3; 1 MG/ML; MG/ML
4 SUSPENSION/ DROPS AURICULAR (OTIC) 2 TIMES DAILY
COMMUNITY
Start: 2022-11-04 | End: 2022-11-11

## 2022-11-05 RX ORDER — IBUPROFEN 800 MG/1
800 TABLET ORAL 3 TIMES DAILY
COMMUNITY
Start: 2022-11-04

## 2022-11-05 RX ORDER — ONDANSETRON 4 MG/1
4 TABLET, ORALLY DISINTEGRATING ORAL ONCE
Status: COMPLETED | OUTPATIENT
Start: 2022-11-05 | End: 2022-11-05

## 2022-11-05 RX ORDER — KETOROLAC TROMETHAMINE 30 MG/ML
30 INJECTION, SOLUTION INTRAMUSCULAR; INTRAVENOUS ONCE
Status: COMPLETED | OUTPATIENT
Start: 2022-11-05 | End: 2022-11-05

## 2022-11-05 RX ORDER — MORPHINE SULFATE 2 MG/ML
2 INJECTION, SOLUTION INTRAMUSCULAR; INTRAVENOUS ONCE
Status: COMPLETED | OUTPATIENT
Start: 2022-11-05 | End: 2022-11-05

## 2022-11-05 RX ORDER — CEFDINIR 300 MG/1
300 CAPSULE ORAL 2 TIMES DAILY
COMMUNITY
Start: 2022-11-04 | End: 2022-11-11

## 2022-11-05 RX ADMIN — KETOROLAC TROMETHAMINE 30 MG: 30 INJECTION, SOLUTION INTRAMUSCULAR at 23:13

## 2022-11-05 RX ADMIN — MORPHINE SULFATE 2 MG: 2 INJECTION, SOLUTION INTRAMUSCULAR; INTRAVENOUS at 23:13

## 2022-11-05 RX ADMIN — ONDANSETRON 4 MG: 4 TABLET, ORALLY DISINTEGRATING ORAL at 23:13

## 2022-11-05 ASSESSMENT — PAIN DESCRIPTION - ORIENTATION: ORIENTATION: LEFT;RIGHT

## 2022-11-05 ASSESSMENT — PAIN - FUNCTIONAL ASSESSMENT: PAIN_FUNCTIONAL_ASSESSMENT: 0-10

## 2022-11-05 ASSESSMENT — PAIN DESCRIPTION - FREQUENCY: FREQUENCY: CONTINUOUS

## 2022-11-05 ASSESSMENT — PAIN SCALES - GENERAL: PAINLEVEL_OUTOF10: 8

## 2022-11-05 ASSESSMENT — PAIN DESCRIPTION - LOCATION: LOCATION: EAR

## 2022-11-05 ASSESSMENT — PAIN DESCRIPTION - DESCRIPTORS: DESCRIPTORS: SHARP;STABBING

## 2022-11-05 ASSESSMENT — PAIN DESCRIPTION - PAIN TYPE: TYPE: ACUTE PAIN

## 2022-11-06 RX ORDER — OXYCODONE HYDROCHLORIDE AND ACETAMINOPHEN 5; 325 MG/1; MG/1
1 TABLET ORAL EVERY 6 HOURS PRN
Qty: 12 TABLET | Refills: 0 | Status: SHIPPED | OUTPATIENT
Start: 2022-11-06 | End: 2022-11-09

## 2022-11-06 ASSESSMENT — ENCOUNTER SYMPTOMS
ALLERGIC/IMMUNOLOGIC NEGATIVE: 1
GASTROINTESTINAL NEGATIVE: 1
EYES NEGATIVE: 1
RESPIRATORY NEGATIVE: 1

## 2022-11-06 NOTE — ED TRIAGE NOTES
Pt to ed complaining of bilateral ear pain with left being worrse. Pt has been seen at [address] [place] [address] ed and has followed up with ebt. Pt is on omnicef and cipro. Sts pain is not getting any better. Sts that she cannot hear out of left ear. Initial onset has been several weeks. Sts that she has been on several antibiotics.

## 2022-11-06 NOTE — DISCHARGE INSTRUCTIONS
CT scan came back quite normal this point in time it does show the swelling of your canals but no obvious abscesses noted. Will get a go ahead and give you some pain medicine for home you should take ibuprofen as well but between the other doses. You need to follow-up with ear nose and throat on Monday as possible. Return back to emergency setting if you are worse. See your family doctor also on Monday if possible. Keep using the antibiotics drops that you were prescribed by the other institution, we have now placed earwicks in your ears to help get the medicine where it supposed to go.

## 2023-01-04 ENCOUNTER — HOSPITAL ENCOUNTER (OUTPATIENT)
Age: 43
Discharge: HOME | End: 2023-01-04
Payer: COMMERCIAL

## 2023-01-04 DIAGNOSIS — E11.9 DIET-CONTROLLED DIABETES MELLITUS (HCC): ICD-10-CM

## 2023-01-04 PROCEDURE — 83036 HEMOGLOBIN GLYCOSYLATED A1C: CPT

## 2023-01-04 PROCEDURE — 36415 COLL VENOUS BLD VENIPUNCTURE: CPT

## 2023-01-05 LAB
ESTIMATED AVERAGE GLUCOSE: 174 MG/DL
HBA1C MFR BLD: 7.7 % (ref 4–6)

## 2023-03-01 ENCOUNTER — OFFICE VISIT (OUTPATIENT)
Dept: PRIMARY CARE CLINIC | Age: 43
End: 2023-03-01
Payer: COMMERCIAL

## 2023-03-01 VITALS
DIASTOLIC BLOOD PRESSURE: 90 MMHG | TEMPERATURE: 97.8 F | WEIGHT: 136 LBS | OXYGEN SATURATION: 97 % | BODY MASS INDEX: 24.1 KG/M2 | HEART RATE: 114 BPM | RESPIRATION RATE: 14 BRPM | HEIGHT: 63 IN | SYSTOLIC BLOOD PRESSURE: 134 MMHG

## 2023-03-01 DIAGNOSIS — R42 DIZZINESS: ICD-10-CM

## 2023-03-01 DIAGNOSIS — B37.31 RECURRENT CANDIDIASIS OF VAGINA: ICD-10-CM

## 2023-03-01 DIAGNOSIS — H69.82 DYSFUNCTION OF LEFT EUSTACHIAN TUBE: ICD-10-CM

## 2023-03-01 DIAGNOSIS — H60.332 ACUTE SWIMMER'S EAR OF LEFT SIDE: Primary | ICD-10-CM

## 2023-03-01 PROCEDURE — 99214 OFFICE O/P EST MOD 30 MIN: CPT | Performed by: FAMILY MEDICINE

## 2023-03-01 RX ORDER — CIPROFLOXACIN AND DEXAMETHASONE 3; 1 MG/ML; MG/ML
4 SUSPENSION/ DROPS AURICULAR (OTIC) 2 TIMES DAILY
Qty: 1 EACH | Refills: 2 | Status: SHIPPED | OUTPATIENT
Start: 2023-03-01 | End: 2023-03-08

## 2023-03-01 RX ORDER — MECLIZINE HCL 12.5 MG/1
12.5 TABLET ORAL 3 TIMES DAILY PRN
Qty: 30 TABLET | Refills: 0 | Status: SHIPPED | OUTPATIENT
Start: 2023-03-01 | End: 2023-03-11

## 2023-03-01 RX ORDER — CEFDINIR 300 MG/1
300 CAPSULE ORAL 2 TIMES DAILY
Qty: 14 CAPSULE | Refills: 0 | Status: SHIPPED | OUTPATIENT
Start: 2023-03-01 | End: 2023-03-08

## 2023-03-01 RX ORDER — PREDNISONE 20 MG/1
TABLET ORAL
Qty: 10 TABLET | Refills: 0 | Status: SHIPPED | OUTPATIENT
Start: 2023-03-01

## 2023-03-01 ASSESSMENT — ENCOUNTER SYMPTOMS
SINUS PAIN: 0
RESPIRATORY NEGATIVE: 1
RHINORRHEA: 0
EYE PAIN: 0
SORE THROAT: 0
EYE REDNESS: 0
SINUS PRESSURE: 0

## 2023-03-01 NOTE — TELEPHONE ENCOUNTER
[facility] called requesting a refill of the below medication which has been pended for you:     Requested Prescriptions     Pending Prescriptions Disp Refills    fluconazole (DIFLUCAN) 150 MG tablet [Pharmacy Med Name: FLUCONAZOLE 150 MG TABLET] 2 tablet 3     Sig: take 1 tablet by mouth AS A ONE TIME DOSE repeat if needed in 4 days       Last Appointment Date: 6/17/2022  Next Appointment Date: Visit date not found    Allergies   Allergen Reactions    Latex Dermatitis    Amoxil [Amoxicillin] Diarrhea    Bactrim [Sulfamethoxazole-Trimethoprim] Itching    Cantaloupe (Diagnostic) Swelling     Tongue swells    Clindamycin/Lincomycin Nausea And Vomiting    Flagyl [Metronidazole] Other (See Comments)     Sees bugs crawling    Biaxin [Clarithromycin] Nausea And Vomiting    Ceftin [Cefuroxime Axetil] Diarrhea and Nausea And Vomiting    Keflex [Cephalexin] Nausea And Vomiting

## 2023-03-01 NOTE — PROGRESS NOTES
3/1/2023     Emily Ferguson (:  1980) is a 39 y.o. female, here for evaluation of the following medical concerns:    Otalgia   There is pain in the left ear. This is a new problem. The current episode started in the past 7 days. The problem occurs constantly. The problem has been gradually worsening. There has been no fever. Associated symptoms include hearing loss (left ear feels plugged). Pertinent negatives include no headaches, rhinorrhea or sore throat. Associated symptoms comments: Today woke up and felt off balance and somewhat dizzy. Has recurrent intermittent issues with fairly significant otitis media and has required hospitalization previously as a result. Ear started bothering her about 4 days ago typical of what has happened when she has had otitis externa in the past and today when she woke up with the dizziness she was concerned that this was getting worse. She does feel like the ear is plugged. Has not had any fever or chills. Has had some mild sinus congestion and drainage but not severe. No sore throat. Right ear is not bothering her. She is concerned as she is going to be traveling to [place] next week for vacation and will be flying and is concerned that her ear is plugged and also getting secondary infection. Has seen ENT for this issue in the past and last visit was in November. . She has tried nothing for the symptoms. Did review patient's med list, allergies, social history,pmhx and pshx today as noted in the record. Review of Systems   Constitutional:  Negative for chills, fatigue and fever. HENT:  Positive for congestion (slight, but not really severe), ear pain, hearing loss (left ear feels plugged) and postnasal drip. Negative for rhinorrhea, sinus pressure, sinus pain and sore throat. Eyes:  Negative for pain, redness and visual disturbance. Respiratory: Negative. Cardiovascular: Negative. Neurological:  Positive for dizziness.  Negative for light-headedness and headaches. Prior to Visit Medications    Medication Sig Taking? Authorizing Provider   ibuprofen (ADVIL;MOTRIN) 800 MG tablet Take 800 mg by mouth 3 times daily Yes Historical Provider, MD   fluticasone (FLONASE) 50 MCG/ACT nasal spray 1-2 sprays by Nasal route daily 2 sprays to each nostril once daily. Yes John Eason, DO   fluconazole (DIFLUCAN) 150 MG tablet Take one daily now and repeat dose in 4 days. Yes John Eason, DO   loratadine (CLARITIN) 10 MG tablet Take 10 mg by mouth nightly Yes Historical Provider, MD   esomeprazole (NEXIUM) 40 MG delayed release capsule TAKE 1 CAPSULE DAILY Yes Arthur Kapadia MD   famotidine (PEPCID) 20 MG tablet Take 1 tablet by mouth nightly Yes Arthur Kapadia MD   dicyclomine (BENTYL) 10 MG capsule Take 1 capsule by mouth 4 times daily as needed (for lower GI symptoms) Yes Arthur Kapadia MD   NONFORMULARY Take 1 capsule by mouth THRIVE capsule, nutritional shake and patch Yes Historical Provider, MD   montelukast (SINGULAIR) 10 MG tablet nightly  Yes Historical Provider, MD        Social History     Tobacco Use    Smoking status: Former     Packs/day: 0.50     Years: 10.00     Pack years: 5.00     Types: Cigarettes     Start date:      Quit date: 2020     Years since quittin.3    Smokeless tobacco: Never    Tobacco comments:     vaping   Substance Use Topics    Alcohol use: Not Currently     Comment: rare        Vitals:    23 0840   BP: (!) 134/90   Pulse: (!) 114   Resp: 14   Temp: 97.8 °F (36.6 °C)   TempSrc: Tympanic   SpO2: 97%   Weight: 136 lb (61.7 kg)   Height: 5' 3\" (1.6 m)     Estimated body mass index is 24.09 kg/m² as calculated from the following:    Height as of this encounter: 5' 3\" (1.6 m). Weight as of this encounter: 136 lb (61.7 kg). Physical Exam  Vitals and nursing note reviewed. Constitutional:       General: She is not in acute distress. Appearance: She is well-developed. She is not diaphoretic.    HENT: Head: Normocephalic and atraumatic. Ears:      Comments: Left outer ear canal with erythema and swelling that is mild at this time. There is no exudate but the inner ear canal does have just slight increased pink coloration. The TM is dull with fluid behind the TM. Right TM and canal appear normal in appearance. Nose: Nose normal. No congestion or rhinorrhea. Mouth/Throat:      Mouth: Mucous membranes are moist.      Pharynx: Oropharynx is clear. No oropharyngeal exudate or posterior oropharyngeal erythema. Eyes:      Conjunctiva/sclera: Conjunctivae normal.   Cardiovascular:      Rate and Rhythm: Normal rate and regular rhythm. Pulmonary:      Effort: Pulmonary effort is normal.      Breath sounds: Normal breath sounds. No wheezing, rhonchi or rales. Musculoskeletal:      Cervical back: Normal range of motion. Lymphadenopathy:      Cervical: No cervical adenopathy. Skin:     General: Skin is warm and dry. Coloration: Skin is not pale. Findings: No erythema or rash. Neurological:      Mental Status: She is alert and oriented to person, place, and time. Psychiatric:         Behavior: Behavior normal.         Thought Content: Thought content normal.         Judgment: Judgment normal.       ASSESSMENT/PLAN:    Encounter Diagnoses   Name Primary?     Acute swimmer's ear of left side Yes    Dysfunction of left eustachian tube     Dizziness      Orders Placed This Encounter   Medications    ciprofloxacin-dexamethasone (CIPRODEX) 0.3-0.1 % otic suspension     Sig: Place 4 drops into the left ear 2 times daily for 7 days     Dispense:  1 each     Refill:  2    cefdinir (OMNICEF) 300 MG capsule     Sig: Take 1 capsule by mouth 2 times daily for 7 days     Dispense:  14 capsule     Refill:  0    predniSONE (DELTASONE) 20 MG tablet     Si bid for 5 days     Dispense:  10 tablet     Refill:  0    meclizine (ANTIVERT) 12.5 MG tablet     Sig: Take 1 tablet by mouth 3 times daily as needed for Dizziness     Dispense:  30 tablet     Refill:  0     Did treat patient more aggressively as she has had issues with severe otitis externa in the past that started very similar to this and ended up with hospitalization. Ultimately we will place her on both oral antibiotic which she had taken previously by her ENT and the Ciprodex. I did put a couple refills on the Ciprodex that she is going to be traveling to [place] and likely will be swimming or have water exposure so just want to make sure that she takes this along to prevent further recurrent issues. I also gave her some prednisone to help with opening up the inner ear tube on the left to help with the eustachian tube symptoms. The dizziness is not severe but we will give her some Antivert to utilize if needed. She can use Tylenol or ibuprofen if needed for pain. If she does have worsening symptoms she is aware that she should return to the urgent care or go to the emergency room if she has fairly severe worsening symptoms or can contact her ENT if needed. (Please note that portions of this note were completed with a voice recognition program. Efforts were made to edit the dictations but occasionally words are mis-transcribed.)    No follow-ups on file. An electronic signature was used to authenticate this note.     --Ana Jean, DO on 3/1/2023 at 8:55 AM

## 2023-03-02 RX ORDER — FLUCONAZOLE 150 MG/1
TABLET ORAL
Qty: 2 TABLET | Refills: 3 | Status: SHIPPED | OUTPATIENT
Start: 2023-03-02

## 2023-03-02 NOTE — TELEPHONE ENCOUNTER
Spoke to patient. States she was seen in UC yesterday and was put on prednisone. States she normally take medication pended when she is on an antibiotic d/t getting a yeast infection every time. States she is not having any symptoms right now and thought she has refills on her last rx but did not. Asking if this can be refilled since she is on an antibiotic.

## 2023-03-02 NOTE — TELEPHONE ENCOUNTER
Noted - pt was started on Prednisone, along with Omnicef and Ciprodex ear drops. Will refill due to pt's recurrent history and upcoming travel per UC note.

## 2023-04-17 DIAGNOSIS — E11.9 TYPE 2 DIABETES MELLITUS WITHOUT COMPLICATION, WITHOUT LONG-TERM CURRENT USE OF INSULIN (HCC): ICD-10-CM

## 2023-04-17 NOTE — TELEPHONE ENCOUNTER
See telephone encounter 4/14/23. Patient needs medication sent to [facility].        Emily called requesting a refill of the below medication which has been pended for you:     Requested Prescriptions     Pending Prescriptions Disp Refills    Dulaglutide (TRULICITY) 5.32 NC/5.6SS SOPN 6 mL 1     Sig: Inject 0.75 mg into the skin once a week       Last Appointment Date: 4/10/2023  Next Appointment Date: 7/12/2023    Allergies   Allergen Reactions    Latex Dermatitis    Amoxil [Amoxicillin] Diarrhea    Bactrim [Sulfamethoxazole-Trimethoprim] Itching    Cantaloupe (Diagnostic) Swelling     Tongue swells    Clindamycin/Lincomycin Nausea And Vomiting    Flagyl [Metronidazole] Other (See Comments)     Sees bugs crawling    Biaxin [Clarithromycin] Nausea And Vomiting    Ceftin [Cefuroxime Axetil] Diarrhea and Nausea And Vomiting    Keflex [Cephalexin] Nausea And Vomiting

## 2023-04-20 RX ORDER — DULAGLUTIDE 0.75 MG/.5ML
0.75 INJECTION, SOLUTION SUBCUTANEOUS WEEKLY
Qty: 6 ML | Refills: 1 | Status: SHIPPED | OUTPATIENT
Start: 2023-04-20

## 2023-07-11 ENCOUNTER — HOSPITAL ENCOUNTER (OUTPATIENT)
Age: 43
Discharge: HOME | End: 2023-07-11
Payer: COMMERCIAL

## 2023-07-11 DIAGNOSIS — E78.5 HYPERLIPIDEMIA, UNSPECIFIED HYPERLIPIDEMIA TYPE: ICD-10-CM

## 2023-07-11 DIAGNOSIS — E11.9 TYPE 2 DIABETES MELLITUS WITHOUT COMPLICATION, WITHOUT LONG-TERM CURRENT USE OF INSULIN (HCC): ICD-10-CM

## 2023-07-11 LAB
ALBUMIN SERPL-MCNC: 4.6 G/DL (ref 3.5–5.2)
ALBUMIN/GLOB SERPL: 2.1 {RATIO} (ref 1–2.5)
ALP SERPL-CCNC: 46 U/L (ref 35–104)
ALT SERPL-CCNC: 21 U/L (ref 5–33)
ANION GAP SERPL CALCULATED.3IONS-SCNC: 10 MMOL/L (ref 9–17)
AST SERPL-CCNC: 17 U/L
BILIRUB SERPL-MCNC: 0.3 MG/DL (ref 0.3–1.2)
BUN SERPL-MCNC: 13 MG/DL (ref 6–20)
BUN/CREAT SERPL: 19 (ref 9–20)
CALCIUM SERPL-MCNC: 9.1 MG/DL (ref 8.6–10.4)
CHLORIDE SERPL-SCNC: 107 MMOL/L (ref 98–107)
CHOLEST SERPL-MCNC: 189 MG/DL
CHOLESTEROL/HDL RATIO: 4.5
CO2 SERPL-SCNC: 25 MMOL/L (ref 20–31)
CREAT SERPL-MCNC: 0.7 MG/DL (ref 0.5–0.9)
CREAT UR-MCNC: 319.7 MG/DL (ref 28–217)
GFR SERPL CREATININE-BSD FRML MDRD: >60 ML/MIN/1.73M2
GLUCOSE SERPL-MCNC: 138 MG/DL (ref 70–99)
HDLC SERPL-MCNC: 42 MG/DL
LDLC SERPL CALC-MCNC: 126 MG/DL (ref 0–130)
MICROALBUMIN UR-MCNC: 45 MG/L
MICROALBUMIN/CREAT UR-RTO: 14 MCG/MG CREAT
POTASSIUM SERPL-SCNC: 4.1 MMOL/L (ref 3.7–5.3)
PROT SERPL-MCNC: 6.8 G/DL (ref 6.4–8.3)
SODIUM SERPL-SCNC: 142 MMOL/L (ref 135–144)
TRIGL SERPL-MCNC: 104 MG/DL
TSH SERPL DL<=0.05 MIU/L-ACNC: 2.85 UIU/ML (ref 0.3–5)

## 2023-07-11 PROCEDURE — 82043 UR ALBUMIN QUANTITATIVE: CPT

## 2023-07-11 PROCEDURE — 84443 ASSAY THYROID STIM HORMONE: CPT

## 2023-07-11 PROCEDURE — 80061 LIPID PANEL: CPT

## 2023-07-11 PROCEDURE — 80053 COMPREHEN METABOLIC PANEL: CPT

## 2023-07-11 PROCEDURE — 83036 HEMOGLOBIN GLYCOSYLATED A1C: CPT

## 2023-07-11 PROCEDURE — 36415 COLL VENOUS BLD VENIPUNCTURE: CPT

## 2023-07-11 PROCEDURE — 82570 ASSAY OF URINE CREATININE: CPT

## 2023-07-12 ENCOUNTER — OFFICE VISIT (OUTPATIENT)
Dept: FAMILY MEDICINE CLINIC | Age: 43
End: 2023-07-12
Payer: COMMERCIAL

## 2023-07-12 ENCOUNTER — HOSPITAL ENCOUNTER (OUTPATIENT)
Age: 43
Setting detail: SPECIMEN
Discharge: HOME | End: 2023-07-12
Payer: COMMERCIAL

## 2023-07-12 VITALS
BODY MASS INDEX: 24.1 KG/M2 | DIASTOLIC BLOOD PRESSURE: 88 MMHG | SYSTOLIC BLOOD PRESSURE: 128 MMHG | HEART RATE: 80 BPM | TEMPERATURE: 97.7 F | HEIGHT: 63 IN | OXYGEN SATURATION: 99 % | WEIGHT: 136 LBS | RESPIRATION RATE: 14 BRPM

## 2023-07-12 DIAGNOSIS — E78.5 HYPERLIPIDEMIA, UNSPECIFIED HYPERLIPIDEMIA TYPE: ICD-10-CM

## 2023-07-12 DIAGNOSIS — E11.9 TYPE 2 DIABETES MELLITUS WITHOUT COMPLICATION, WITHOUT LONG-TERM CURRENT USE OF INSULIN (HCC): Primary | ICD-10-CM

## 2023-07-12 DIAGNOSIS — R35.0 URINARY FREQUENCY: ICD-10-CM

## 2023-07-12 LAB
BILIRUB UR QL STRIP: NEGATIVE
CLARITY UR: CLEAR
COLOR UR: YELLOW
COMMENT UA: NORMAL
EST. AVERAGE GLUCOSE BLD GHB EST-MCNC: 137 MG/DL
GLUCOSE UR STRIP-MCNC: NEGATIVE MG/DL
HBA1C MFR BLD: 6.4 % (ref 4–6)
HGB UR QL STRIP.AUTO: NEGATIVE
KETONES UR STRIP-MCNC: NEGATIVE MG/DL
LEUKOCYTE ESTERASE UR QL STRIP: NEGATIVE
NITRITE UR QL STRIP: NEGATIVE
PH UR STRIP: 6 [PH] (ref 5–6)
PROT UR STRIP-MCNC: NEGATIVE MG/DL
SP GR UR STRIP: 1.02 (ref 1.01–1.02)
UROBILINOGEN UR STRIP-ACNC: NORMAL

## 2023-07-12 PROCEDURE — 81003 URINALYSIS AUTO W/O SCOPE: CPT

## 2023-07-12 PROCEDURE — 3044F HG A1C LEVEL LT 7.0%: CPT | Performed by: FAMILY MEDICINE

## 2023-07-12 PROCEDURE — 99214 OFFICE O/P EST MOD 30 MIN: CPT | Performed by: FAMILY MEDICINE

## 2023-07-12 NOTE — PROGRESS NOTES
[address] [facility]  [address] [facility], [address] [place] [address]  [phone]      Emily Natarajan is a 39 y.o. female who presents today for her medical conditions/complaints as noted below. Livan Hinojosa is c/o of Diabetes      HPI:     Pt here today for follow-up of DM.     Has been having urinary frequency x past few days    If she has pasta, she will try to add a protein; eats chicken nuggets, hamburgers    Still doing protein shakes in the morning; does protein bars through the day    Glucose has been running [phone]    Using Trulicity 3.53 mg once weekly on Saturday's  Feels GI sx's the next day    Only using Bentyl infrequently as needed    Last dose of Diflucan a few months ago            Past Medical History:   Diagnosis Date    Allergic rhinitis     COVID 12/27/2021    Diabetes mellitus (HCC)     diet control    GERD (gastroesophageal reflux disease)     GERD (gastroesophageal reflux disease)     Hemorrhoids     Hyperlipidemia     Hypoglycemia     Kidney stones     Prolonged emergence from general anesthesia     Tobacco abuse     UTI (urinary tract infection)     frequency      Past Surgical History:   Procedure Laterality Date    CARPAL TUNNEL RELEASE Left     COLONOSCOPY N/A 03/01/2022    rosa elena, Dr. Domingo Montour at [facility] ([address]) N/A 03/11/2022    ROBOTIC ASSITED TOTAL LAPAROSCOPIC HYSTERECTOMY BILATERAL SALPINGECTOMY performed by Julito Montiel MD at [facility] Right 09/07/2016    laser ureteroscopic lithotripsy    [place] EGD TRANSORAL BIOPSY SINGLE/MULTIPLE N/A 03/13/2017    EGD BIOPSY performed by Abbi Alvarez MD at [facility] Bilateral     approximately 2013    TUBAL LIGATION      UPPER GASTROINTESTINAL ENDOSCOPY  2010    hiatal hernia     UPPER GASTROINTESTINAL ENDOSCOPY  03/13/2017    mild gastritis, Dr. Anat Torres

## 2023-07-24 ASSESSMENT — ENCOUNTER SYMPTOMS: DIARRHEA: 1

## 2023-08-28 ENCOUNTER — HOSPITAL ENCOUNTER (OUTPATIENT)
Age: 43
Discharge: HOME | End: 2023-08-28
Payer: COMMERCIAL

## 2023-08-28 ENCOUNTER — OFFICE VISIT (OUTPATIENT)
Dept: PRIMARY CARE CLINIC | Age: 43
End: 2023-08-28
Payer: COMMERCIAL

## 2023-08-28 VITALS
SYSTOLIC BLOOD PRESSURE: 150 MMHG | RESPIRATION RATE: 14 BRPM | DIASTOLIC BLOOD PRESSURE: 100 MMHG | HEART RATE: 75 BPM | OXYGEN SATURATION: 98 % | BODY MASS INDEX: 23.94 KG/M2 | WEIGHT: 135.13 LBS | HEIGHT: 63 IN | TEMPERATURE: 98.6 F

## 2023-08-28 DIAGNOSIS — N32.89 BLADDER SPASM: ICD-10-CM

## 2023-08-28 DIAGNOSIS — N30.01 ACUTE CYSTITIS WITH HEMATURIA: Primary | ICD-10-CM

## 2023-08-28 DIAGNOSIS — N30.01 ACUTE CYSTITIS WITH HEMATURIA: ICD-10-CM

## 2023-08-28 LAB
AMORPH SED URNS QL MICRO: ABNORMAL
BACTERIA URNS QL MICRO: ABNORMAL
BILIRUB UR QL STRIP: NEGATIVE
CHARACTER UR: ABNORMAL
CLARITY UR: CLEAR
COLOR UR: YELLOW
EPI CELLS #/AREA URNS HPF: ABNORMAL /HPF (ref 0–5)
GLUCOSE UR STRIP-MCNC: NEGATIVE MG/DL
HGB UR QL STRIP.AUTO: ABNORMAL
KETONES UR STRIP-MCNC: NEGATIVE MG/DL
LEUKOCYTE ESTERASE UR QL STRIP: ABNORMAL
NITRITE UR QL STRIP: POSITIVE
PH UR STRIP: 7 [PH] (ref 5–6)
PROT UR STRIP-MCNC: NEGATIVE MG/DL
RBC #/AREA URNS HPF: ABNORMAL /HPF (ref 0–4)
SP GR UR STRIP: 1.01 (ref 1.01–1.02)
UROBILINOGEN UR STRIP-ACNC: NORMAL EU/DL (ref 0–1)
WBC #/AREA URNS HPF: ABNORMAL /HPF (ref 0–4)

## 2023-08-28 PROCEDURE — 99213 OFFICE O/P EST LOW 20 MIN: CPT | Performed by: STUDENT IN AN ORGANIZED HEALTH CARE EDUCATION/TRAINING PROGRAM

## 2023-08-28 PROCEDURE — 81001 URINALYSIS AUTO W/SCOPE: CPT

## 2023-08-28 PROCEDURE — 87086 URINE CULTURE/COLONY COUNT: CPT

## 2023-08-28 RX ORDER — PHENAZOPYRIDINE HYDROCHLORIDE 200 MG/1
200 TABLET, FILM COATED ORAL 3 TIMES DAILY PRN
Qty: 9 TABLET | Refills: 0 | Status: SHIPPED | OUTPATIENT
Start: 2023-08-28 | End: 2023-08-31

## 2023-08-28 RX ORDER — CONJUGATED ESTROGENS 0.62 MG/G
CREAM VAGINAL
COMMUNITY
Start: 2023-08-09

## 2023-08-28 RX ORDER — CIPROFLOXACIN 500 MG/1
500 TABLET, FILM COATED ORAL 2 TIMES DAILY
Qty: 10 TABLET | Refills: 0 | Status: SHIPPED | OUTPATIENT
Start: 2023-08-28 | End: 2023-09-02

## 2023-08-28 RX ORDER — OXYCODONE HYDROCHLORIDE AND ACETAMINOPHEN 5; 325 MG/1; MG/1
1 TABLET ORAL EVERY 6 HOURS PRN
Qty: 12 TABLET | Refills: 0 | Status: SHIPPED | OUTPATIENT
Start: 2023-08-28 | End: 2023-08-31

## 2023-08-28 ASSESSMENT — ENCOUNTER SYMPTOMS
NAUSEA: 0
DIARRHEA: 0
SHORTNESS OF BREATH: 0
COUGH: 0
BLOOD IN STOOL: 0
VOMITING: 0
CONSTIPATION: 0
TROUBLE SWALLOWING: 0
EYE PAIN: 0
ABDOMINAL PAIN: 0

## 2023-08-28 NOTE — PROGRESS NOTES
[facility]             [address] [facility], [address] [place], [place] [address]on [place]                        Telephone [phone]             Fax [phone]       Emily Lowry  :  1980  Age:  39 y.o. MRN:  [id number]  Date of visit:  2023     Assessment and Plan:    1. Acute cystitis with hematuria  Acute cystitis we will start Cipro 500 mg twice daily for total 5 days. We will also run urine culture at this time. - ciprofloxacin (CIPRO) 500 MG tablet; Take 1 tablet by mouth 2 times daily for 5 days  Dispense: 10 tablet; Refill: 0  - Culture, Urine; Future    2. Bladder spasm  We will trial Pyridium 200 mg 3 times daily as needed for pain and bladder spasm. Recommend return to the urgent care if symptoms worsen or fail to improve. - Urinalysis with Reflex to Culture; Future  - phenazopyridine (PYRIDIUM) 200 MG tablet; Take 1 tablet by mouth 3 times daily as needed for Pain  Dispense: 9 tablet; Refill: 0      Subjective:    Devendra Kincaid is a 39 y.o. female who presents to [facility] today (2023) for evaluation of:  Urinary Pain (She is having bladder spasm to the point she wants to vomit. She states she feels like she is nine months pregnant, she reports lower abd is heavy, she is having burning with urination. )    Patient is a 41-year-old female presents to the urgent care today for evaluation of urinary pain. States she is having a lot of bladder spasms and feels abdominal cramping. She states that she is also having burning with urination this has been ongoing for the past 2 days or so. She states that she is concerned for possible urinary tract infection. Tried some medication at home with no relief. Chief Complaint   Patient presents with    Urinary Pain     She is having bladder spasm to the point she wants to vomit.  She states she feels like she is nine months pregnant, she reports lower abd is heavy, she is

## 2023-08-30 ENCOUNTER — TELEPHONE (OUTPATIENT)
Dept: PRIMARY CARE CLINIC | Age: 43
End: 2023-08-30

## 2023-08-30 LAB
MICROORGANISM SPEC CULT: NO GROWTH
SPECIMEN DESCRIPTION: NORMAL

## 2023-08-30 NOTE — TELEPHONE ENCOUNTER
----- Message from Odalys Hinton DO sent at 8/30/2023  1:50 PM EDT -----  No growth on UA. Continue abx until completed.

## 2023-08-31 ENCOUNTER — TELEPHONE (OUTPATIENT)
Dept: PRIMARY CARE CLINIC | Age: 43
End: 2023-08-31

## 2023-08-31 DIAGNOSIS — N30.01 ACUTE CYSTITIS WITH HEMATURIA: Primary | ICD-10-CM

## 2023-08-31 RX ORDER — PHENAZOPYRIDINE HYDROCHLORIDE 200 MG/1
200 TABLET, FILM COATED ORAL 3 TIMES DAILY PRN
Qty: 15 TABLET | Refills: 0 | Status: SHIPPED | OUTPATIENT
Start: 2023-08-31 | End: 2023-09-05

## 2023-08-31 NOTE — TELEPHONE ENCOUNTER
You saw patient on Monday 8/28/23 in the walk in clinic and prescribed pyridium for bladder infection. Patient states she is now out of the medication, feeling better but wondered if you would be willing to call more in.

## 2023-09-05 ENCOUNTER — HOSPITAL ENCOUNTER (OUTPATIENT)
Age: 43
Setting detail: SPECIMEN
Discharge: HOME | End: 2023-09-05
Payer: COMMERCIAL

## 2023-09-05 ENCOUNTER — HOSPITAL ENCOUNTER (OUTPATIENT)
Dept: CT IMAGING | Age: 43
Discharge: HOME | End: 2023-09-07
Payer: COMMERCIAL

## 2023-09-05 ENCOUNTER — OFFICE VISIT (OUTPATIENT)
Dept: FAMILY MEDICINE CLINIC | Age: 43
End: 2023-09-05
Payer: COMMERCIAL

## 2023-09-05 VITALS
SYSTOLIC BLOOD PRESSURE: 146 MMHG | HEIGHT: 63 IN | BODY MASS INDEX: 24.27 KG/M2 | DIASTOLIC BLOOD PRESSURE: 92 MMHG | HEART RATE: 86 BPM | WEIGHT: 137 LBS | OXYGEN SATURATION: 96 %

## 2023-09-05 DIAGNOSIS — R35.0 URINARY FREQUENCY: ICD-10-CM

## 2023-09-05 DIAGNOSIS — R31.9 URINARY TRACT INFECTION WITH HEMATURIA, SITE UNSPECIFIED: ICD-10-CM

## 2023-09-05 DIAGNOSIS — Z87.442 HISTORY OF KIDNEY STONES: ICD-10-CM

## 2023-09-05 DIAGNOSIS — N32.89 BLADDER SPASMS: ICD-10-CM

## 2023-09-05 DIAGNOSIS — N39.0 URINARY TRACT INFECTION WITH HEMATURIA, SITE UNSPECIFIED: ICD-10-CM

## 2023-09-05 DIAGNOSIS — N30.01 ACUTE CYSTITIS WITH HEMATURIA: ICD-10-CM

## 2023-09-05 DIAGNOSIS — R35.0 URINARY FREQUENCY: Primary | ICD-10-CM

## 2023-09-05 LAB
BACTERIA URNS QL MICRO: ABNORMAL
BILIRUB UR QL STRIP: ABNORMAL
CHARACTER UR: ABNORMAL
CLARITY UR: CLEAR
COLOR UR: ABNORMAL
COMMENT: ABNORMAL
EPI CELLS #/AREA URNS HPF: ABNORMAL /HPF (ref 0–5)
GLUCOSE UR STRIP-MCNC: ABNORMAL MG/DL
HGB UR QL STRIP.AUTO: ABNORMAL
KETONES UR STRIP-MCNC: NEGATIVE MG/DL
LEUKOCYTE ESTERASE UR QL STRIP: NEGATIVE
NITRITE UR QL STRIP: POSITIVE
PH UR STRIP: 6.5 [PH] (ref 5–6)
PROT UR STRIP-MCNC: ABNORMAL MG/DL
RBC #/AREA URNS HPF: ABNORMAL /HPF (ref 0–4)
SP GR UR STRIP: 1.01 (ref 1.01–1.02)
UROBILINOGEN UR STRIP-ACNC: ABNORMAL EU/DL (ref 0–1)
WBC #/AREA URNS HPF: ABNORMAL /HPF (ref 0–4)

## 2023-09-05 PROCEDURE — 81001 URINALYSIS AUTO W/SCOPE: CPT

## 2023-09-05 PROCEDURE — 99214 OFFICE O/P EST MOD 30 MIN: CPT | Performed by: STUDENT IN AN ORGANIZED HEALTH CARE EDUCATION/TRAINING PROGRAM

## 2023-09-05 PROCEDURE — 74176 CT ABD & PELVIS W/O CONTRAST: CPT

## 2023-09-05 RX ORDER — TAMSULOSIN HYDROCHLORIDE 0.4 MG/1
0.4 CAPSULE ORAL DAILY
Qty: 30 CAPSULE | Refills: 0 | Status: SHIPPED | OUTPATIENT
Start: 2023-09-05

## 2023-09-05 RX ORDER — OXYCODONE HYDROCHLORIDE AND ACETAMINOPHEN 5; 325 MG/1; MG/1
1 TABLET ORAL EVERY 6 HOURS PRN
Qty: 12 TABLET | Refills: 0 | Status: SHIPPED | OUTPATIENT
Start: 2023-09-05 | End: 2023-09-08

## 2023-09-05 RX ORDER — PHENAZOPYRIDINE HYDROCHLORIDE 200 MG/1
200 TABLET, FILM COATED ORAL 3 TIMES DAILY PRN
Qty: 30 TABLET | Refills: 0 | Status: SHIPPED | OUTPATIENT
Start: 2023-09-05 | End: 2023-09-15

## 2023-09-05 NOTE — PROGRESS NOTES
[address]  [address] [facility] [address]  Dept: [phone]  Dept Fax: [phone]  Loc: [phone]      Emily Natarajan is a 39 y.o. female who presents today for:  Chief Complaint   Patient presents with    Frequent/Recurrent UTI     Seen in [facility] 8/28 for UTI. Still having urinary frequency and bladder spasms         Goals         Stop Cigarette/Tobacco use (pt-stated)       Patient Self-Management Goal for Health Maintenance  Goal: I will chose a goal related to tobacco cessation:  I will set a quit date. Barriers: none   Plan for overcoming my barriers: N/A  Confidence: 5/10  Anticipated Goal Completion Date: 11/2017          Weight (lb) < 135 (pt-stated)       Patient Self-Management Goal for Health Maintenance  Goal: I will exercise for 30 minutes 3-5 days per week.   Barriers: none  Plan for overcoming my barriers: N/A  Confidence: 9/10  Anticipated Goal Completion Date: will check in with progress at next f/u visit              HPI:     HPI      Past Medical History:   Diagnosis Date    Allergic rhinitis     COVID 12/27/2021    Diabetes mellitus (HCC)     diet control    GERD (gastroesophageal reflux disease)     GERD (gastroesophageal reflux disease)     Hemorrhoids     Hyperlipidemia     Hypoglycemia     Kidney stones     Prolonged emergence from general anesthesia     Tobacco abuse     UTI (urinary tract infection)     frequency      Past Surgical History:   Procedure Laterality Date    CARPAL TUNNEL RELEASE Left     COLONOSCOPY N/A 03/01/2022    Dr. Jose L cuba at [address] [place] [address] ([facility]) N/A 03/11/2022    ROBOTIC ASSITED TOTAL LAPAROSCOPIC HYSTERECTOMY BILATERAL SALPINGECTOMY performed by Julito Montiel MD at [facility] 09/07/2016    laser ureteroscopic lithotripsy    [place] EGD TRANSORAL

## 2023-09-08 ENCOUNTER — TELEPHONE (OUTPATIENT)
Dept: FAMILY MEDICINE CLINIC | Age: 43
End: 2023-09-08

## 2023-09-08 DIAGNOSIS — N20.0 KIDNEY STONE: Primary | ICD-10-CM

## 2023-09-08 NOTE — TELEPHONE ENCOUNTER
----- Message from Arrey sent at 9/8/2023  8:42 AM EDT -----  Subject: Message to Provider    QUESTIONS  Information for Provider? Attn? Dr. Obie Thompson nurse? Kidney stones has   not passed yet. Stopped Flomax due to nausea and making feel   faint/lightheaded. Please call to discuss. ---------------------------------------------------------------------------  --------------  Awilda Moise [facility]  [phone]; OK to leave message on voicemail,OK to respond with electronic   message via [employer] portal (only for patients who have registered [employer]   account)  ---------------------------------------------------------------------------  --------------  SCRIPT ANSWERS  Relationship to Patient?  Self

## 2023-09-08 NOTE — TELEPHONE ENCOUNTER
Calling back to see what she is to do. Has not passed kidney stone and she stopped med as it was nauseating her.  Please advise

## 2023-09-08 NOTE — TELEPHONE ENCOUNTER
Spoke with patient and offered to give her something for the nausea so she can continue to take the Flomax but she states it made her entire body numb and ill. Advised her to continue to push water and that I would place a referral to urology for her since it has been going on for about 14 days. Advised if pain got extreme to report to the ER. Patient states understanding and transferred to urology to schedule.

## 2023-09-11 ENCOUNTER — OFFICE VISIT (OUTPATIENT)
Dept: UROLOGY | Age: 43
End: 2023-09-11
Payer: COMMERCIAL

## 2023-09-11 ENCOUNTER — HOSPITAL ENCOUNTER (OUTPATIENT)
Age: 43
Setting detail: OUTPATIENT SURGERY
Discharge: HOME | End: 2023-09-11
Attending: UROLOGY | Admitting: UROLOGY
Payer: COMMERCIAL

## 2023-09-11 ENCOUNTER — ANESTHESIA (OUTPATIENT)
Dept: OPERATING ROOM | Age: 43
End: 2023-09-11
Payer: COMMERCIAL

## 2023-09-11 ENCOUNTER — APPOINTMENT (OUTPATIENT)
Dept: GENERAL RADIOLOGY | Age: 43
End: 2023-09-11
Attending: UROLOGY
Payer: COMMERCIAL

## 2023-09-11 ENCOUNTER — ANESTHESIA EVENT (OUTPATIENT)
Dept: OPERATING ROOM | Age: 43
End: 2023-09-11
Payer: COMMERCIAL

## 2023-09-11 VITALS
WEIGHT: 134.4 LBS | BODY MASS INDEX: 23.81 KG/M2 | RESPIRATION RATE: 18 BRPM | TEMPERATURE: 97.2 F | SYSTOLIC BLOOD PRESSURE: 163 MMHG | HEART RATE: 92 BPM | OXYGEN SATURATION: 94 % | DIASTOLIC BLOOD PRESSURE: 92 MMHG | HEIGHT: 63 IN

## 2023-09-11 VITALS
SYSTOLIC BLOOD PRESSURE: 124 MMHG | DIASTOLIC BLOOD PRESSURE: 82 MMHG | WEIGHT: 137 LBS | BODY MASS INDEX: 24.27 KG/M2 | HEIGHT: 63 IN | HEART RATE: 94 BPM | OXYGEN SATURATION: 95 %

## 2023-09-11 DIAGNOSIS — N20.0 NEPHROLITHIASIS: Primary | ICD-10-CM

## 2023-09-11 DIAGNOSIS — G89.18 POSTOPERATIVE PAIN: Primary | ICD-10-CM

## 2023-09-11 DIAGNOSIS — N28.1 RENAL CYST: ICD-10-CM

## 2023-09-11 DIAGNOSIS — N39.0 RECURRENT UTI: ICD-10-CM

## 2023-09-11 DIAGNOSIS — E11.9 TYPE 2 DIABETES MELLITUS WITHOUT COMPLICATION, WITHOUT LONG-TERM CURRENT USE OF INSULIN (HCC): ICD-10-CM

## 2023-09-11 LAB — GLUCOSE BLD-MCNC: 123 MG/DL (ref 65–105)

## 2023-09-11 PROCEDURE — 2580000003 HC RX 258: Performed by: UROLOGY

## 2023-09-11 PROCEDURE — 6360000002 HC RX W HCPCS: Performed by: NURSE ANESTHETIST, CERTIFIED REGISTERED

## 2023-09-11 PROCEDURE — 3700000000 HC ANESTHESIA ATTENDED CARE: Performed by: UROLOGY

## 2023-09-11 PROCEDURE — 82947 ASSAY GLUCOSE BLOOD QUANT: CPT

## 2023-09-11 PROCEDURE — 2720000010 HC SURG SUPPLY STERILE: Performed by: UROLOGY

## 2023-09-11 PROCEDURE — 7100000001 HC PACU RECOVERY - ADDTL 15 MIN: Performed by: UROLOGY

## 2023-09-11 PROCEDURE — 3700000001 HC ADD 15 MINUTES (ANESTHESIA): Performed by: UROLOGY

## 2023-09-11 PROCEDURE — 7100000010 HC PHASE II RECOVERY - FIRST 15 MIN: Performed by: UROLOGY

## 2023-09-11 PROCEDURE — C2617 STENT, NON-COR, TEM W/O DEL: HCPCS | Performed by: UROLOGY

## 2023-09-11 PROCEDURE — 7100000000 HC PACU RECOVERY - FIRST 15 MIN: Performed by: UROLOGY

## 2023-09-11 PROCEDURE — 3600000003 HC SURGERY LEVEL 3 BASE: Performed by: UROLOGY

## 2023-09-11 PROCEDURE — 7100000011 HC PHASE II RECOVERY - ADDTL 15 MIN: Performed by: UROLOGY

## 2023-09-11 PROCEDURE — 6360000002 HC RX W HCPCS: Performed by: UROLOGY

## 2023-09-11 PROCEDURE — 3600000013 HC SURGERY LEVEL 3 ADDTL 15MIN: Performed by: UROLOGY

## 2023-09-11 PROCEDURE — 2500000003 HC RX 250 WO HCPCS: Performed by: NURSE ANESTHETIST, CERTIFIED REGISTERED

## 2023-09-11 PROCEDURE — 2709999900 HC NON-CHARGEABLE SUPPLY: Performed by: UROLOGY

## 2023-09-11 PROCEDURE — 99214 OFFICE O/P EST MOD 30 MIN: CPT | Performed by: UROLOGY

## 2023-09-11 DEVICE — URETERAL STENT
Type: IMPLANTABLE DEVICE | Site: URETER | Status: FUNCTIONAL
Brand: CONTOUR™

## 2023-09-11 RX ORDER — OXYBUTYNIN CHLORIDE 5 MG/1
10 TABLET, EXTENDED RELEASE ORAL NIGHTLY
Status: DISCONTINUED | OUTPATIENT
Start: 2023-09-11 | End: 2023-09-11 | Stop reason: HOSPADM

## 2023-09-11 RX ORDER — HYDROCODONE BITARTRATE AND ACETAMINOPHEN 5; 325 MG/1; MG/1
1 TABLET ORAL EVERY 6 HOURS PRN
Qty: 18 TABLET | Refills: 0 | Status: SHIPPED | OUTPATIENT
Start: 2023-09-11 | End: 2023-09-16

## 2023-09-11 RX ORDER — LIDOCAINE HYDROCHLORIDE 20 MG/ML
INJECTION, SOLUTION EPIDURAL; INFILTRATION; INTRACAUDAL; PERINEURAL PRN
Status: DISCONTINUED | OUTPATIENT
Start: 2023-09-11 | End: 2023-09-11 | Stop reason: SDUPTHER

## 2023-09-11 RX ORDER — PROPOFOL 10 MG/ML
INJECTION, EMULSION INTRAVENOUS PRN
Status: DISCONTINUED | OUTPATIENT
Start: 2023-09-11 | End: 2023-09-11 | Stop reason: SDUPTHER

## 2023-09-11 RX ORDER — ONDANSETRON 2 MG/ML
INJECTION INTRAMUSCULAR; INTRAVENOUS PRN
Status: DISCONTINUED | OUTPATIENT
Start: 2023-09-11 | End: 2023-09-11 | Stop reason: SDUPTHER

## 2023-09-11 RX ORDER — FENTANYL CITRATE 50 UG/ML
INJECTION, SOLUTION INTRAMUSCULAR; INTRAVENOUS PRN
Status: DISCONTINUED | OUTPATIENT
Start: 2023-09-11 | End: 2023-09-11 | Stop reason: SDUPTHER

## 2023-09-11 RX ORDER — SODIUM CHLORIDE 9 MG/ML
INJECTION, SOLUTION INTRAVENOUS PRN
Status: DISCONTINUED | OUTPATIENT
Start: 2023-09-11 | End: 2023-09-11 | Stop reason: HOSPADM

## 2023-09-11 RX ORDER — KETOROLAC TROMETHAMINE 30 MG/ML
INJECTION, SOLUTION INTRAMUSCULAR; INTRAVENOUS PRN
Status: DISCONTINUED | OUTPATIENT
Start: 2023-09-11 | End: 2023-09-11 | Stop reason: SDUPTHER

## 2023-09-11 RX ORDER — LEVOFLOXACIN 5 MG/ML
500 INJECTION, SOLUTION INTRAVENOUS
Status: DISCONTINUED | OUTPATIENT
Start: 2023-09-11 | End: 2023-09-11 | Stop reason: HOSPADM

## 2023-09-11 RX ORDER — CIPROFLOXACIN 500 MG/1
500 TABLET, FILM COATED ORAL 2 TIMES DAILY
Qty: 14 TABLET | Refills: 0 | Status: SHIPPED | OUTPATIENT
Start: 2023-09-11 | End: 2023-09-18

## 2023-09-11 RX ORDER — MIDAZOLAM HYDROCHLORIDE 1 MG/ML
INJECTION INTRAMUSCULAR; INTRAVENOUS PRN
Status: DISCONTINUED | OUTPATIENT
Start: 2023-09-11 | End: 2023-09-11 | Stop reason: SDUPTHER

## 2023-09-11 RX ORDER — OXYBUTYNIN CHLORIDE 10 MG/1
10 TABLET, EXTENDED RELEASE ORAL DAILY
Qty: 30 TABLET | Refills: 2 | Status: SHIPPED | OUTPATIENT
Start: 2023-09-11

## 2023-09-11 RX ORDER — SODIUM CHLORIDE 0.9 % (FLUSH) 0.9 %
5-40 SYRINGE (ML) INJECTION EVERY 12 HOURS SCHEDULED
Status: DISCONTINUED | OUTPATIENT
Start: 2023-09-11 | End: 2023-09-11 | Stop reason: HOSPADM

## 2023-09-11 RX ORDER — DEXAMETHASONE SODIUM PHOSPHATE 10 MG/ML
INJECTION INTRAMUSCULAR; INTRAVENOUS PRN
Status: DISCONTINUED | OUTPATIENT
Start: 2023-09-11 | End: 2023-09-11 | Stop reason: SDUPTHER

## 2023-09-11 RX ORDER — FENTANYL CITRATE 50 UG/ML
25 INJECTION, SOLUTION INTRAMUSCULAR; INTRAVENOUS EVERY 5 MIN PRN
Status: COMPLETED | OUTPATIENT
Start: 2023-09-11 | End: 2023-09-11

## 2023-09-11 RX ORDER — FENTANYL CITRATE 50 UG/ML
25 INJECTION, SOLUTION INTRAMUSCULAR; INTRAVENOUS ONCE
Status: COMPLETED | OUTPATIENT
Start: 2023-09-11 | End: 2023-09-11

## 2023-09-11 RX ORDER — SODIUM CHLORIDE 0.9 % (FLUSH) 0.9 %
5-40 SYRINGE (ML) INJECTION PRN
Status: DISCONTINUED | OUTPATIENT
Start: 2023-09-11 | End: 2023-09-11 | Stop reason: HOSPADM

## 2023-09-11 RX ORDER — DIPHENHYDRAMINE HYDROCHLORIDE 50 MG/ML
INJECTION INTRAMUSCULAR; INTRAVENOUS PRN
Status: DISCONTINUED | OUTPATIENT
Start: 2023-09-11 | End: 2023-09-11 | Stop reason: SDUPTHER

## 2023-09-11 RX ORDER — SODIUM CHLORIDE, SODIUM LACTATE, POTASSIUM CHLORIDE, CALCIUM CHLORIDE 600; 310; 30; 20 MG/100ML; MG/100ML; MG/100ML; MG/100ML
INJECTION, SOLUTION INTRAVENOUS CONTINUOUS
Status: DISCONTINUED | OUTPATIENT
Start: 2023-09-11 | End: 2023-09-11 | Stop reason: HOSPADM

## 2023-09-11 RX ADMIN — ONDANSETRON 4 MG: 2 INJECTION INTRAMUSCULAR; INTRAVENOUS at 15:02

## 2023-09-11 RX ADMIN — FENTANYL CITRATE 25 MCG: 50 INJECTION, SOLUTION INTRAMUSCULAR; INTRAVENOUS at 16:02

## 2023-09-11 RX ADMIN — HYDROMORPHONE HYDROCHLORIDE 0.5 MG: 1 INJECTION, SOLUTION INTRAMUSCULAR; INTRAVENOUS; SUBCUTANEOUS at 16:20

## 2023-09-11 RX ADMIN — FENTANYL CITRATE 25 MCG: 50 INJECTION, SOLUTION INTRAMUSCULAR; INTRAVENOUS at 16:07

## 2023-09-11 RX ADMIN — FENTANYL CITRATE 25 MCG: 50 INJECTION, SOLUTION INTRAMUSCULAR; INTRAVENOUS at 15:57

## 2023-09-11 RX ADMIN — PROPOFOL 200 MG: 10 INJECTION, EMULSION INTRAVENOUS at 14:53

## 2023-09-11 RX ADMIN — FENTANYL CITRATE 100 MCG: 50 INJECTION, SOLUTION INTRAMUSCULAR; INTRAVENOUS at 14:51

## 2023-09-11 RX ADMIN — DEXAMETHASONE SODIUM PHOSPHATE 10 MG: 10 INJECTION INTRAMUSCULAR; INTRAVENOUS at 15:02

## 2023-09-11 RX ADMIN — KETOROLAC TROMETHAMINE 30 MG: 30 INJECTION, SOLUTION INTRAMUSCULAR at 15:20

## 2023-09-11 RX ADMIN — FENTANYL CITRATE 25 MCG: 50 INJECTION, SOLUTION INTRAMUSCULAR; INTRAVENOUS at 16:18

## 2023-09-11 RX ADMIN — DIPHENHYDRAMINE HYDROCHLORIDE 20 MG: 50 INJECTION, SOLUTION INTRAMUSCULAR; INTRAVENOUS at 15:02

## 2023-09-11 RX ADMIN — SODIUM CHLORIDE, POTASSIUM CHLORIDE, SODIUM LACTATE AND CALCIUM CHLORIDE: 600; 310; 30; 20 INJECTION, SOLUTION INTRAVENOUS at 14:04

## 2023-09-11 RX ADMIN — SODIUM CHLORIDE, POTASSIUM CHLORIDE, SODIUM LACTATE AND CALCIUM CHLORIDE: 600; 310; 30; 20 INJECTION, SOLUTION INTRAVENOUS at 14:53

## 2023-09-11 RX ADMIN — MIDAZOLAM HYDROCHLORIDE 2 MG: 1 INJECTION, SOLUTION INTRAMUSCULAR; INTRAVENOUS at 14:51

## 2023-09-11 RX ADMIN — LIDOCAINE HYDROCHLORIDE 100 MG: 20 INJECTION, SOLUTION EPIDURAL; INFILTRATION; INTRACAUDAL; PERINEURAL at 14:53

## 2023-09-11 ASSESSMENT — PAIN SCALES - GENERAL
PAINLEVEL_OUTOF10: 8
PAINLEVEL_OUTOF10: 5
PAINLEVEL_OUTOF10: 8
PAINLEVEL_OUTOF10: 8
PAINLEVEL_OUTOF10: 7
PAINLEVEL_OUTOF10: 5
PAINLEVEL_OUTOF10: 4
PAINLEVEL_OUTOF10: 8
PAINLEVEL_OUTOF10: 6

## 2023-09-11 ASSESSMENT — PAIN DESCRIPTION - LOCATION
LOCATION: ABDOMEN

## 2023-09-11 ASSESSMENT — PAIN DESCRIPTION - ORIENTATION
ORIENTATION: LOWER

## 2023-09-11 ASSESSMENT — PAIN DESCRIPTION - DESCRIPTORS
DESCRIPTORS: ACHING
DESCRIPTORS: CRAMPING
DESCRIPTORS: ACHING
DESCRIPTORS: BURNING

## 2023-09-11 ASSESSMENT — PAIN - FUNCTIONAL ASSESSMENT: PAIN_FUNCTIONAL_ASSESSMENT: 0-10

## 2023-09-11 NOTE — DISCHARGE INSTRUCTIONS
Pt ok to discharge home in good condition  No heavy lifting, >10 lbs for today  Pt should avoid strenuous activity for today  Pt should walk moderately at home  Pt ok to shower   Pt may resume diet as tolerated  Pt should take Rx as directed  No driving while on narcotics  Please call attending physician or hospital  with questions  Call or Present to ED if fever (> 101F), intractable nausea vomiting or pain. Rx in chart    Pt should follow up with Jimbo Garces MD, in 4 weeks, call to confirm appointment      Pt should Pull stent in 5 days. There may be some pain associated with the stent removal, which is usually self-limiting. We suggest using the pain medication prescribed for you and a nonsteroidal anti-inflammatory such as Ibuprofen, if you are able to take this medication, to control symptoms. Please stay hydrated. Please call with questions.

## 2023-09-11 NOTE — BRIEF OP NOTE
Brief Postoperative Note      Patient: Karson Michelle  YOB: 1980  MRN: [id number]    Date of Procedure: 9/11/2023    Pre-Op Diagnosis Codes:     * Bilateral ureteral calculi [N20.1]    Post-Op Diagnosis: Same       Procedure(s):  Cystoscopy Bilateral Ureteroscopy, Holmium laser, right stone basketing,  and bilateral stent placement    Surgeon(s):  Cristobal Damian MD    Assistant:  * No surgical staff found *    Anesthesia: General    Estimated Blood Loss (mL): Minimal    Complications: None    Specimens:   * No specimens in log *    Implants:  Implant Name Type Inv. Item Serial No.  Lot No. LRB No. Used Action   STENT URET 6FR L26CM PERCFLX HYDR+ Moira John [id number]  [facility] 6FR L26CM PERCFLX HYDR+ TAPR TIP GRAD  [employer]- [id number] Left 1 Implanted   STENT URET 6FR L26CM PERCFLX HYDR+ TAPR [facility] [id number]  STENT URET 6FR L26CM PERCFLX HYDR+ TAPR TIP GRAD  [employer]- [id number] Right 1 Implanted         Drains: * No LDAs found *    Findings: small stones did not have to be lasered. Right stone basketed. Pull stents in five days.  F/u rodriguez lithjohnk in 4-8 weeks      Electronically signed by Vasquez Justin MD on 9/11/2023 at 3:30 PM

## 2023-09-11 NOTE — ANESTHESIA PRE PROCEDURE
Department of Anesthesiology  Preprocedure Note       Name:  Salina Wheat   Age:  39 y.o.  :  1980                                          MRN:  [id number]         Date:  2023      Surgeon: Nguyễn Be):  Marifer Hanks MD    Procedure: Procedure(s):  Cystoscopy Bilateral Ureteroscopy Holmium Laser bilateral stent placement Jennifer Miner #[id number] -João/nr)    Medications prior to admission:   Prior to Admission medications    Medication Sig Start Date End Date Taking? Authorizing Provider   phenazopyridine (PYRIDIUM) 200 MG tablet Take 1 tablet by mouth 3 times daily as needed for Pain 9/5/23 9/15/23  Kwadwo FAUSTIN Spitnale, DO   tamsulosin (FLOMAX) 0.4 MG capsule Take 1 capsule by mouth daily  Patient not taking: Reported on 2023   Kwadwo Gamboa DO   PREMARIN 0.625 MG/GM CREA vaginal cream  23   Historical Provider, MD   Dulaglutide (TRULICITY) 8.58 ZN/7.4UA SOPN Inject 0.75 mg into the skin once a week 23   Jordan Eason DO   esomeprazole (NEXIUM) 40 MG delayed release capsule Take 1 capsule by mouth daily 4/10/23   Jordan Eason DO   famotidine (PEPCID) 20 MG tablet Take 1 tablet by mouth nightly 4/10/23   Jordan Eason DO   montelukast (SINGULAIR) 10 MG tablet Take 1 tablet by mouth nightly 4/10/23   Jordan Eason DO   fluconazole (DIFLUCAN) 150 MG tablet Take 1 tab by mouth once; repeat dose again in 3 days. Patient not taking: Reported on 2023 3/2/23   Jordan Eason DO   fluticasone [facility]) 50 MCG/ACT nasal spray 1-2 sprays by Nasal route daily 2 sprays to each nostril once daily.  10/27/22   Jordan Eason DO   loratadine (CLARITIN) 10 MG tablet Take 1 tablet by mouth nightly    Historical Provider, MD   dicyclomine (BENTYL) 10 MG capsule Take 1 capsule by mouth 4 times daily as needed (for lower GI symptoms) 21   Katalina Delarosa MD   NONFORMULARY Take 1 capsule by mouth THRIVE capsule, nutritional shake and patch    Historical Provider, MD       Current medications:

## 2023-09-11 NOTE — TELEPHONE ENCOUNTER
Emily called requesting a refill of the below medication which has been pended for you:     Requested Prescriptions     Pending Prescriptions Disp Refills    TRULICITY 9.71 KX/3.4TP SOPN [Pharmacy Med Name: TRULICITY(4) PEN 3.65/0.9] 6 mL 1     Sig: INJECT 0.5ML (=0.75 MG)    SUBCUTANEOUSLY ONCE WEEKLY       Last Appointment Date: 7/12/2023  Next Appointment Date: 1/15/2024    Allergies   Allergen Reactions    Latex Dermatitis    Amoxil [Amoxicillin] Diarrhea    Bactrim [Sulfamethoxazole-Trimethoprim] Itching    Cantaloupe (Diagnostic) Swelling     Tongue swells    Clindamycin/Lincomycin Nausea And Vomiting    Flagyl [Metronidazole] Other (See Comments)     Sees bugs crawling    Flomax [Tamsulosin Hcl] Nausea Only and Other (See Comments)     Lightheaded, couldn't walk by herself    Biaxin [Clarithromycin] Nausea And Vomiting    Ceftin [Cefuroxime Axetil] Diarrhea and Nausea And Vomiting    Keflex [Cephalexin] Nausea And Vomiting

## 2023-09-11 NOTE — PROGRESS NOTES
Peyman Emmanuel MD.    [place] [address]  [address] [facility]  [place] [address] [facility]  Dept: [phone]  Dept Fax: [phone] [facility] Urology Office Note -     Patient:  Anat Ferrer  YOB: 1980    The patient is a 39 y.o. female who presents today for evaluation of the following problems:   Chief Complaint   Patient presents with    Nephrolithiasis     Flank pain, bladder spasms     referred/consultation requested by Dominique Sharif DO. History of Present Illness:    Kidney stones  Pain and bladder spasms  Right sided stone  Pt very uncomfortable in office today      Requested/reviewed records from Dominique Sharif DO office and/or outside physician/EMR    (Patient's old records have been requested, reviewed and pertinent findings summarized in today's note.)    Procedures Today: N/A      Last several PSA's:  No results found for: \"PSA\"    Last total testosterone:  No results found for: \"TESTOSTERONE\"    Urinalysis today:  No results found for this visit on 09/11/23. Last BUN and creatinine:  Lab Results   Component Value Date    BUN 13 07/11/2023     Lab Results   Component Value Date    CREATININE 0.7 07/11/2023         Imaging Reviewed during this Office Visit:   [address] [place] [address] Road, MD independently reviewed the images and verified the radiology reports from:    CT ABDOMEN PELVIS WO CONTRAST Additional Contrast? None    Result Date: 9/5/2023  EXAMINATION: CT OF THE ABDOMEN AND PELVIS WITHOUT CONTRAST 9/5/2023 1:41 pm TECHNIQUE: CT of the abdomen and pelvis was performed without the administration of intravenous contrast. Multiplanar reformatted images are provided for review. Automated exposure control, iterative reconstruction, and/or weight based adjustment of the mA/kV was utilized to reduce the radiation dose to as low as reasonably achievable. COMPARISON: 10/26/2022.  HISTORY: ORDERING

## 2023-09-11 NOTE — H&P
Ruben Cody MD  History and Physical    Patient:  Caitlin Montano  MRN: [id number]  YOB: 1980    HISTORY OF PRESENT ILLNESS:     The patient is a 39 y.o. female who presents with bl stones. Here for procedure. Patient's old records, notes and chart reviewed and summarized above. Ruben Cody MD independently reviewed the images and verified the radiology reports from:    CT ABDOMEN PELVIS WO CONTRAST Additional Contrast? None    Result Date: 9/5/2023  EXAMINATION: CT OF THE ABDOMEN AND PELVIS WITHOUT CONTRAST 9/5/2023 1:41 pm TECHNIQUE: CT of the abdomen and pelvis was performed without the administration of intravenous contrast. Multiplanar reformatted images are provided for review. Automated exposure control, iterative reconstruction, and/or weight based adjustment of the mA/kV was utilized to reduce the radiation dose to as low as reasonably achievable. COMPARISON: 10/26/2022. HISTORY: ORDERING SYSTEM PROVIDED HISTORY: Bladder spasms TECHNOLOGIST PROVIDED HISTORY: recurrent uti, bladder spasms Is the patient pregnant?->No Reason for Exam: Bladder pain/spasms, recurrent utis; hx of kidney stones FINDINGS: Lower Chest: There is mild dependent atelectasis in the lower lobes. There is no pleural effusion. Organs: The liver, gallbladder, spleen, adrenal glands and pancreas appear normal.  There is a 3 mm stone in the bladder in the vicinity of the right ureteral orifice. There is no associated hydronephrosis. There are 2 stones at the lower pole the right kidney measuring 3 mm or less. There are least 7 small stones in the left kidney. GI/Bowel: There is no bowel dilatation or bowel wall thickening. The appendix and stomach are unremarkable. Pelvis: Other than the stone, the bladder appears normal.  There has been a hysterectomy. There is no free fluid. Peritoneum/Retroperitoneum: No evidence of lymphadenopathy. Aorta is normal in caliber.   No fat stranding, free fluid, free air or focal issues  Musculoskeletal: negative  Skin: negative   Neurological: negative  Hematological/Lymphatic: negative  Psychological: negative    Physical Exam:      No data found. Constitutional: Patient in no acute distress; Neuro: alert and oriented to person place and time. Psych: Mood and affect normal.  Skin: Normal  Lungs: Respiratory effort normal, CTA  Cardiovascular:  Normal peripheral pulses; no murmur. Normal rhythm  Abdomen: Soft, non-tender, non-distended with no CVA, flank pain, hepatosplenomegaly or hernia. Kidneys normal.  Bladder non-tender and not distended. LABS:   No results for input(s): \"WBC\", \"HGB\", \"HCT\", \"MCV\", \"PLT\" in the last 72 hours. No results for input(s): \"NA\", \"K\", \"CL\", \"CO2\", \"PHOS\", \"BUN\", \"CREATININE\", \"CA\" in the last 72 hours. No results found for: \"PSA\"      Urinalysis: No results for input(s): \"COLORU\", \"PHUR\", \"LABCAST\", \"WBCUA\", \"RBCUA\", \"MUCUS\", \"TRICHOMONAS\", \"YEAST\", \"BACTERIA\", \"CLARITYU\", \"SPECGRAV\", \"LEUKOCYTESUR\", \"UROBILINOGEN\", \"BILIRUBINUR\", \"BLOODU\" in the last 72 hours.     Invalid input(s): \"NITRATE\", \"GLUCOSEUKETONESUAMORPHOUS\"     -----------------------------------------------------------------      Assessment and Plan     Impression:    Patient Active Problem List   Diagnosis    Hyperlipidemia    Tobacco use    Vertigo, benign paroxysmal    Type 2 diabetes mellitus without complication, without long-term current use of insulin (HCC)    LPRD (laryngopharyngeal reflux disease)    Seasonal allergic rhinitis due to pollen    Sinus problem    Candidiasis of vagina       Plan:     Consent obtained; cysto bl urs w hll in OR today    Refmaddie Tom MD  12:43 PM 9/11/2023

## 2023-09-11 NOTE — ANESTHESIA POSTPROCEDURE EVALUATION
Department of Anesthesiology  Postprocedure Note    Patient: Jayde Damon  MRN: [id number]  YOB: 1980  Date of evaluation: 9/11/2023      Procedure Summary     Date: 09/11/23 Room / Location: [address] [place] [address]    Anesthesia Start: 1452 Anesthesia Stop: 2118    Procedure: Cystoscopy Bilateral Ureteroscopy, Holmium laser, right stone basketing,  and bilateral stent placement (Bilateral) Diagnosis:       Bilateral ureteral calculi      (Bilateral ureteral calculi [N20.1])    Surgeons: Tanner Gómez MD Responsible Provider: DEAN Cevallos CRNA    Anesthesia Type: general ASA Status: 2          Anesthesia Type: No value filed.     Phong Phase I: Phong Score: 5    Phong Phase II:        Anesthesia Post Evaluation    Patient location during evaluation: PACU  Patient participation: complete - patient participated  Level of consciousness: awake and alert  Pain score: 4  Airway patency: patent  Nausea & Vomiting: no nausea  Complications: no  Cardiovascular status: blood pressure returned to baseline and hemodynamically stable  Respiratory status: acceptable  Hydration status: euvolemic  Multimodal analgesia pain management approach  Pain management: adequate and satisfactory to patient

## 2023-09-14 ENCOUNTER — TELEPHONE (OUTPATIENT)
Dept: FAMILY MEDICINE CLINIC | Age: 43
End: 2023-09-14

## 2023-09-14 DIAGNOSIS — N20.0 KIDNEY STONE: Primary | ICD-10-CM

## 2023-09-14 NOTE — TELEPHONE ENCOUNTER
Pt calling stating she had been seen previously for kidney stones and given pain meds, pt was seen on Monday and had stents placed, pt was given Norco #18 on 9-11 and pt only has a few left and states she needs more meds for her pain, pt uses pended pharmacy, please advise.

## 2023-09-15 RX ORDER — DULAGLUTIDE 0.75 MG/.5ML
0.75 INJECTION, SOLUTION SUBCUTANEOUS WEEKLY
Qty: 6 ML | Refills: 1 | Status: SHIPPED | OUTPATIENT
Start: 2023-09-15

## 2023-09-15 RX ORDER — HYDROCODONE BITARTRATE AND ACETAMINOPHEN 5; 325 MG/1; MG/1
1 TABLET ORAL EVERY 6 HOURS PRN
Qty: 20 TABLET | Refills: 0 | Status: SHIPPED | OUTPATIENT
Start: 2023-09-15 | End: 2023-09-20

## 2023-09-15 ASSESSMENT — ENCOUNTER SYMPTOMS
VOMITING: 0
NAUSEA: 0
CONSTIPATION: 0
TROUBLE SWALLOWING: 0
DIARRHEA: 0
ABDOMINAL PAIN: 0
EYE PAIN: 0
BLOOD IN STOOL: 0
SHORTNESS OF BREATH: 0
COUGH: 0

## 2023-09-18 ENCOUNTER — TELEPHONE (OUTPATIENT)
Dept: UROLOGY | Age: 43
End: 2023-09-18

## 2023-09-18 NOTE — TELEPHONE ENCOUNTER
Patient called stating she had surgery with Dr Rommel Alvarado last Monday and she was uncertain when she was to come back to see Dr Rommel Alvarado in the office.   Call her at [phone]

## 2023-09-24 NOTE — OP NOTE
disease, ureteral trauma, or papillary lesion. To place the stent a 22 Khmer rigid cystoscopy was back loaded over the wire. Under direct visualization the stent was advanced until it was in proper location. The Glidewire was then removed. A curl could be seen in the Left renal pelvis under using fluoroscopic vision, and in the bladder under direct visualization. A string was left on the stent. The patients bladder was drained. All instrumentation was removed. The patient was then awakened and discharged back to the PACU in good and stable condition.

## 2023-09-30 DIAGNOSIS — Z87.442 HISTORY OF KIDNEY STONES: ICD-10-CM

## 2023-10-04 RX ORDER — TAMSULOSIN HYDROCHLORIDE 0.4 MG/1
0.4 CAPSULE ORAL DAILY
Qty: 30 CAPSULE | Refills: 0 | OUTPATIENT
Start: 2023-10-04

## 2023-11-23 DIAGNOSIS — J30.1 CHRONIC SEASONAL ALLERGIC RHINITIS DUE TO POLLEN: ICD-10-CM

## 2023-11-29 RX ORDER — FLUTICASONE PROPIONATE 50 MCG
1-2 SPRAY, SUSPENSION (ML) NASAL DAILY
Qty: 48 G | Refills: 3 | Status: SHIPPED | OUTPATIENT
Start: 2023-11-29

## 2024-01-10 ENCOUNTER — HOSPITAL ENCOUNTER (OUTPATIENT)
Age: 44
Discharge: HOME | End: 2024-01-10
Payer: COMMERCIAL

## 2024-01-10 DIAGNOSIS — R10.9 ABDOMINAL CRAMPING: ICD-10-CM

## 2024-01-10 DIAGNOSIS — E11.9 TYPE 2 DIABETES MELLITUS WITHOUT COMPLICATION, WITHOUT LONG-TERM CURRENT USE OF INSULIN (HCC): ICD-10-CM

## 2024-01-10 LAB
EST. AVERAGE GLUCOSE BLD GHB EST-MCNC: 160 MG/DL
HBA1C MFR BLD: 7.2 % (ref 4–6)

## 2024-01-10 PROCEDURE — 36415 COLL VENOUS BLD VENIPUNCTURE: CPT

## 2024-01-10 PROCEDURE — 83036 HEMOGLOBIN GLYCOSYLATED A1C: CPT

## 2024-01-11 RX ORDER — DICYCLOMINE HYDROCHLORIDE 10 MG/1
10 CAPSULE ORAL 4 TIMES DAILY PRN
Qty: 60 CAPSULE | Refills: 0 | Status: SHIPPED | OUTPATIENT
Start: 2024-01-11

## 2024-01-11 NOTE — TELEPHONE ENCOUNTER
KB pt, TWV on notes    Emily called requesting a refill of the below medication which has been pended for you: pt lactose intolerant-takes this to help with the abd discomfort, does not take often maybe 3-4 times a week, but pt is completely out, requested supply to [facility], will discuss long term options with BLANCA at next appt.    Requested Prescriptions     Pending Prescriptions Disp Refills    dicyclomine (BENTYL) 10 MG capsule 120 capsule 0     Sig: Take 1 capsule by mouth 4 times daily as needed (for lower GI symptoms)       Last Appointment Date: 09/05/2023  Next Appointment Date: 02/19/2024    Allergies   Allergen Reactions    Latex Dermatitis    Amoxil [Amoxicillin] Diarrhea    Bactrim [Sulfamethoxazole-Trimethoprim] Itching    Cantaloupe (Diagnostic) Swelling     Tongue swells    Clindamycin/Lincomycin Nausea And Vomiting    Flagyl [Metronidazole] Other (See Comments)     Sees bugs crawling    Flomax [Tamsulosin Hcl] Nausea Only and Other (See Comments)     Lightheaded, couldn't walk by herself    Biaxin [Clarithromycin] Nausea And Vomiting    Ceftin [Cefuroxime Axetil] Diarrhea and Nausea And Vomiting    Keflex [Cephalexin] Nausea And Vomiting

## 2024-01-18 DIAGNOSIS — R10.9 ABDOMINAL CRAMPING: ICD-10-CM

## 2024-01-18 RX ORDER — DICYCLOMINE HYDROCHLORIDE 10 MG/1
CAPSULE ORAL
Qty: 60 CAPSULE | Refills: 0 | OUTPATIENT
Start: 2024-01-18

## 2024-01-23 DIAGNOSIS — E11.9 TYPE 2 DIABETES MELLITUS WITHOUT COMPLICATION, WITHOUT LONG-TERM CURRENT USE OF INSULIN (HCC): Primary | ICD-10-CM

## 2024-02-19 ENCOUNTER — HOSPITAL ENCOUNTER (OUTPATIENT)
Age: 44
Discharge: HOME | End: 2024-02-19
Payer: COMMERCIAL

## 2024-02-19 ENCOUNTER — OFFICE VISIT (OUTPATIENT)
Dept: FAMILY MEDICINE CLINIC | Age: 44
End: 2024-02-19
Payer: COMMERCIAL

## 2024-02-19 VITALS
TEMPERATURE: 98 F | RESPIRATION RATE: 14 BRPM | OXYGEN SATURATION: 99 % | SYSTOLIC BLOOD PRESSURE: 136 MMHG | BODY MASS INDEX: 24.27 KG/M2 | HEIGHT: 63 IN | DIASTOLIC BLOOD PRESSURE: 86 MMHG | WEIGHT: 137 LBS | HEART RATE: 90 BPM

## 2024-02-19 DIAGNOSIS — B37.31 RECURRENT CANDIDIASIS OF VAGINA: ICD-10-CM

## 2024-02-19 DIAGNOSIS — E11.9 TYPE 2 DIABETES MELLITUS WITHOUT COMPLICATION, WITHOUT LONG-TERM CURRENT USE OF INSULIN (HCC): ICD-10-CM

## 2024-02-19 DIAGNOSIS — R10.9 ABDOMINAL CRAMPING: ICD-10-CM

## 2024-02-19 DIAGNOSIS — R10.9 FLANK PAIN: ICD-10-CM

## 2024-02-19 DIAGNOSIS — H62.40 OTITIS EXTERNA, FUNGAL: ICD-10-CM

## 2024-02-19 DIAGNOSIS — E11.9 TYPE 2 DIABETES MELLITUS WITHOUT COMPLICATION, WITHOUT LONG-TERM CURRENT USE OF INSULIN (HCC): Primary | ICD-10-CM

## 2024-02-19 DIAGNOSIS — B36.9 OTITIS EXTERNA, FUNGAL: ICD-10-CM

## 2024-02-19 DIAGNOSIS — Z87.442 HISTORY OF KIDNEY STONES: ICD-10-CM

## 2024-02-19 DIAGNOSIS — E78.5 HYPERLIPIDEMIA, UNSPECIFIED HYPERLIPIDEMIA TYPE: ICD-10-CM

## 2024-02-19 DIAGNOSIS — R10.30 LOWER ABDOMINAL PAIN: ICD-10-CM

## 2024-02-19 LAB
ALBUMIN SERPL-MCNC: 4.8 G/DL (ref 3.5–5.2)
ALBUMIN/GLOB SERPL: 1.7 {RATIO} (ref 1–2.5)
ALP SERPL-CCNC: 59 U/L (ref 35–104)
ALT SERPL-CCNC: 24 U/L (ref 5–33)
ANION GAP SERPL CALCULATED.3IONS-SCNC: 9 MMOL/L (ref 9–17)
AST SERPL-CCNC: 18 U/L
BILIRUB SERPL-MCNC: 0.4 MG/DL (ref 0.3–1.2)
BILIRUB UR QL STRIP: NEGATIVE
BUN SERPL-MCNC: 12 MG/DL (ref 6–20)
BUN/CREAT SERPL: 15 (ref 9–20)
CALCIUM SERPL-MCNC: 9.1 MG/DL (ref 8.6–10.4)
CHLORIDE SERPL-SCNC: 102 MMOL/L (ref 98–107)
CLARITY UR: CLEAR
CO2 SERPL-SCNC: 28 MMOL/L (ref 20–31)
COLOR UR: YELLOW
COMMENT: ABNORMAL
CREAT SERPL-MCNC: 0.8 MG/DL (ref 0.5–0.9)
GFR SERPL CREATININE-BSD FRML MDRD: >60 ML/MIN/1.73M2
GLUCOSE SERPL-MCNC: 124 MG/DL (ref 70–99)
GLUCOSE UR STRIP-MCNC: NEGATIVE MG/DL
HGB UR QL STRIP.AUTO: NEGATIVE
KETONES UR STRIP-MCNC: NEGATIVE MG/DL
LEUKOCYTE ESTERASE UR QL STRIP: NEGATIVE
NITRITE UR QL STRIP: NEGATIVE
PH UR STRIP: 6.5 [PH] (ref 5–6)
POTASSIUM SERPL-SCNC: 4.4 MMOL/L (ref 3.7–5.3)
PROT SERPL-MCNC: 7.7 G/DL (ref 6.4–8.3)
PROT UR STRIP-MCNC: NEGATIVE MG/DL
SODIUM SERPL-SCNC: 139 MMOL/L (ref 135–144)
SP GR UR STRIP: 1.02 (ref 1.01–1.02)
TSH SERPL DL<=0.05 MIU/L-ACNC: 1.33 UIU/ML (ref 0.3–5)
UROBILINOGEN UR STRIP-ACNC: NORMAL EU/DL (ref 0–1)

## 2024-02-19 PROCEDURE — 36415 COLL VENOUS BLD VENIPUNCTURE: CPT

## 2024-02-19 PROCEDURE — 80053 COMPREHEN METABOLIC PANEL: CPT

## 2024-02-19 PROCEDURE — 99214 OFFICE O/P EST MOD 30 MIN: CPT | Performed by: FAMILY MEDICINE

## 2024-02-19 PROCEDURE — 84443 ASSAY THYROID STIM HORMONE: CPT

## 2024-02-19 PROCEDURE — 81003 URINALYSIS AUTO W/O SCOPE: CPT

## 2024-02-19 PROCEDURE — 3051F HG A1C>EQUAL 7.0%<8.0%: CPT | Performed by: FAMILY MEDICINE

## 2024-02-19 RX ORDER — DICYCLOMINE HYDROCHLORIDE 10 MG/1
10 CAPSULE ORAL 4 TIMES DAILY PRN
Qty: 120 CAPSULE | Refills: 3 | Status: SHIPPED | OUTPATIENT
Start: 2024-02-19

## 2024-02-19 RX ORDER — NYSTATIN 100000 U/G
CREAM TOPICAL
Qty: 30 G | Refills: 1 | Status: SHIPPED | OUTPATIENT
Start: 2024-02-19

## 2024-02-19 RX ORDER — FLUCONAZOLE 150 MG/1
TABLET ORAL
Qty: 2 TABLET | Refills: 3 | Status: SHIPPED | OUTPATIENT
Start: 2024-02-19

## 2024-02-19 RX ORDER — DULAGLUTIDE 0.75 MG/.5ML
0.75 INJECTION, SOLUTION SUBCUTANEOUS WEEKLY
Qty: 6 ML | Refills: 3 | Status: SHIPPED | OUTPATIENT
Start: 2024-02-19

## 2024-02-19 NOTE — PROGRESS NOTES
[facility]  [address] [place] [address]  [phone]      Emily León is a 44 y.o. female who presents today for her medical conditions/complaints as noted below.  Emily León is c/o of Diabetes (6 mo f/u) and Ear Problem (Left ear)      HPI:     Pt here today for follow-up of DM and ear problem.    Last A1c was increased from 6.4% to 7.2%.  Pt admits that she has been eating more pasta due to cost of food; tries to have protein with it.  Taking Trulicity 0.75 mg once weekly on Saturday's.      Has been having more diarrhea intermittently; also having abd pain after eating.  Not related to any certain type of food; even reacts to bland foods.  Located in lower abdomen, like \"cutting knives\" pain.  Can last hours to days.  Can get bloated after eating and feel \"full\".  Has some nausea and decreased appetite.  Taking Bentyl 10 mg QID PRN, which does seem to help.  Also using heating pad and Tylenol.  Thought she might have been constipated, but after eating cheese recently on pizza, she had diarrhea all day and then has not been \"normal since\".      Has had constipation noted on imaging in the past.  Had been taking stool softener, but it caused diarrhea after awhile, so she stopped.    Ear Problem  This is a new problem. The current episode started 1 to 4 weeks ago (few weeks ago). Associated symptoms include abdominal pain and nausea. Pertinent negatives include no fever or vomiting. Associated symptoms comments: Itching in ear canal; sometimes feels \"wet\". Treatments tried: peroxide on the outside of the ear.         Past Medical History:   Diagnosis Date    Allergic rhinitis     COVID 12/27/2021    Diabetes mellitus (HCC)     diet control    GERD (gastroesophageal reflux disease)     GERD (gastroesophageal reflux disease)     Hemorrhoids     Hyperlipidemia     Hypoglycemia     Kidney stones     Prolonged emergence from general anesthesia     Tobacco abuse     UTI

## 2024-02-22 ENCOUNTER — HOSPITAL ENCOUNTER (OUTPATIENT)
Dept: CT IMAGING | Age: 44
Discharge: HOME | End: 2024-02-24
Attending: FAMILY MEDICINE
Payer: COMMERCIAL

## 2024-02-22 DIAGNOSIS — R10.30 LOWER ABDOMINAL PAIN: ICD-10-CM

## 2024-02-22 PROCEDURE — 74176 CT ABD & PELVIS W/O CONTRAST: CPT

## 2024-02-26 ENCOUNTER — TELEPHONE (OUTPATIENT)
Dept: FAMILY MEDICINE CLINIC | Age: 44
End: 2024-02-26

## 2024-02-26 NOTE — TELEPHONE ENCOUNTER
Patient is calling stating that she would like her labs and CT read asap because she is worried. Please advise.

## 2024-02-28 ENCOUNTER — TELEPHONE (OUTPATIENT)
Dept: UROLOGY | Age: 44
End: 2024-02-28

## 2024-02-28 ENCOUNTER — HOSPITAL ENCOUNTER (OUTPATIENT)
Age: 44
Discharge: HOME | End: 2024-02-28
Payer: COMMERCIAL

## 2024-02-28 DIAGNOSIS — N20.0 NEPHROLITHIASIS: ICD-10-CM

## 2024-02-28 DIAGNOSIS — N39.0 RECURRENT UTI: Primary | ICD-10-CM

## 2024-02-28 DIAGNOSIS — N23 KIDNEY PAIN: ICD-10-CM

## 2024-02-28 DIAGNOSIS — Z87.442 HISTORY OF KIDNEY STONES: Primary | ICD-10-CM

## 2024-02-28 PROCEDURE — 87086 URINE CULTURE/COLONY COUNT: CPT

## 2024-02-28 NOTE — TELEPHONE ENCOUNTER
Patient was transferred to the office from Dr Eason to make an appointment with nephrology. (Nephrolithiasis - Hx kidney stones)  Patient states she recently had a CT scan. (lower abdominal pain)  Patient states she has more kidney stones in both kidneys.  Patient is asking if her follow up appointment with Jessika Levy on June 28 needs to be moved up.      Call back #[phone]

## 2024-02-28 NOTE — TELEPHONE ENCOUNTER
Comparing CT scan from 9/2023 to 2/2024 no increase in stone burden. Okay to continue surveillance.

## 2024-02-28 NOTE — TELEPHONE ENCOUNTER
Jessika- can you review recent CT and see if you think patient needs seen sooner than planned appt in 3 mo. Thanks!

## 2024-02-28 NOTE — PROGRESS NOTES
Referral for nephrology was placed for reoccurrant kidney stones and patient still having some kidney pain and wanted to make sure she hasn't developed a UTI since last UA so urine culture was placed.

## 2024-02-29 LAB
MICROORGANISM SPEC CULT: NO GROWTH
SPECIMEN DESCRIPTION: NORMAL

## 2024-03-04 ENCOUNTER — HOSPITAL ENCOUNTER (EMERGENCY)
Age: 44
Discharge: HOME | End: 2024-03-04
Attending: EMERGENCY MEDICINE
Payer: COMMERCIAL

## 2024-03-04 ENCOUNTER — APPOINTMENT (OUTPATIENT)
Dept: CT IMAGING | Age: 44
End: 2024-03-04
Payer: COMMERCIAL

## 2024-03-04 VITALS
WEIGHT: 135 LBS | BODY MASS INDEX: 23.91 KG/M2 | OXYGEN SATURATION: 98 % | TEMPERATURE: 98.9 F | HEART RATE: 118 BPM | RESPIRATION RATE: 18 BRPM | DIASTOLIC BLOOD PRESSURE: 101 MMHG | SYSTOLIC BLOOD PRESSURE: 162 MMHG

## 2024-03-04 DIAGNOSIS — R10.9 FLANK PAIN: Primary | ICD-10-CM

## 2024-03-04 LAB
ALBUMIN SERPL-MCNC: 4.6 G/DL (ref 3.5–5.2)
ALBUMIN/GLOB SERPL: 1.7 {RATIO} (ref 1–2.5)
ALP SERPL-CCNC: 58 U/L (ref 35–104)
ALT SERPL-CCNC: 25 U/L (ref 5–33)
ANION GAP SERPL CALCULATED.3IONS-SCNC: 11 MMOL/L (ref 9–17)
AST SERPL-CCNC: 19 U/L
BASOPHILS # BLD: 0.06 K/UL (ref 0–0.2)
BASOPHILS NFR BLD: 1 % (ref 0–2)
BILIRUB SERPL-MCNC: 0.2 MG/DL (ref 0.3–1.2)
BILIRUB UR QL STRIP: NEGATIVE
BUN SERPL-MCNC: 14 MG/DL (ref 6–20)
BUN/CREAT SERPL: 20 (ref 9–20)
CALCIUM SERPL-MCNC: 9.1 MG/DL (ref 8.6–10.4)
CHLORIDE SERPL-SCNC: 104 MMOL/L (ref 98–107)
CLARITY UR: CLEAR
CO2 SERPL-SCNC: 21 MMOL/L (ref 20–31)
COLOR UR: YELLOW
COMMENT: ABNORMAL
CREAT SERPL-MCNC: 0.7 MG/DL (ref 0.5–0.9)
EOSINOPHIL # BLD: 0.16 K/UL (ref 0–0.44)
EOSINOPHILS RELATIVE PERCENT: 2 % (ref 1–4)
ERYTHROCYTE [DISTWIDTH] IN BLOOD BY AUTOMATED COUNT: 12 % (ref 11.8–14.4)
GFR SERPL CREATININE-BSD FRML MDRD: >60 ML/MIN/1.73M2
GLUCOSE SERPL-MCNC: 256 MG/DL (ref 70–99)
GLUCOSE UR STRIP-MCNC: ABNORMAL MG/DL
HCT VFR BLD AUTO: 39.3 % (ref 36.3–47.1)
HGB BLD-MCNC: 13.9 G/DL (ref 11.9–15.1)
HGB UR QL STRIP.AUTO: NEGATIVE
IMM GRANULOCYTES # BLD AUTO: 0.03 K/UL (ref 0–0.3)
IMM GRANULOCYTES NFR BLD: 0 %
KETONES UR STRIP-MCNC: ABNORMAL MG/DL
LEUKOCYTE ESTERASE UR QL STRIP: NEGATIVE
LIPASE SERPL-CCNC: 34 U/L (ref 13–60)
LYMPHOCYTES NFR BLD: 2.1 K/UL (ref 1.1–3.7)
LYMPHOCYTES RELATIVE PERCENT: 25 % (ref 24–43)
MCH RBC QN AUTO: 32.2 PG (ref 25.2–33.5)
MCHC RBC AUTO-ENTMCNC: 35.4 G/DL (ref 25.2–33.5)
MCV RBC AUTO: 91 FL (ref 82.6–102.9)
MONOCYTES NFR BLD: 0.48 K/UL (ref 0.1–1.2)
MONOCYTES NFR BLD: 6 % (ref 3–12)
NEUTROPHILS NFR BLD: 66 % (ref 36–65)
NEUTS SEG NFR BLD: 5.5 K/UL (ref 1.5–8.1)
NITRITE UR QL STRIP: NEGATIVE
NRBC BLD-RTO: 0 PER 100 WBC
PH UR STRIP: 6 [PH] (ref 5–6)
PLATELET # BLD AUTO: 238 K/UL (ref 138–453)
PMV BLD AUTO: 10.8 FL (ref 8.1–13.5)
POTASSIUM SERPL-SCNC: 4.1 MMOL/L (ref 3.7–5.3)
PROT SERPL-MCNC: 7.3 G/DL (ref 6.4–8.3)
PROT UR STRIP-MCNC: NEGATIVE MG/DL
RBC # BLD AUTO: 4.32 M/UL (ref 3.95–5.11)
SODIUM SERPL-SCNC: 136 MMOL/L (ref 135–144)
SP GR UR STRIP: 1.02 (ref 1.01–1.02)
TROPONIN I SERPL HS-MCNC: <6 NG/L (ref 0–14)
UROBILINOGEN UR STRIP-ACNC: NORMAL EU/DL (ref 0–1)
WBC OTHER # BLD: 8.3 K/UL (ref 3.5–11.3)

## 2024-03-04 PROCEDURE — 96374 THER/PROPH/DIAG INJ IV PUSH: CPT

## 2024-03-04 PROCEDURE — 2580000003 HC RX 258: Performed by: EMERGENCY MEDICINE

## 2024-03-04 PROCEDURE — 2709999900 CT ABDOMEN PELVIS W IV CONTRAST

## 2024-03-04 PROCEDURE — 80053 COMPREHEN METABOLIC PANEL: CPT

## 2024-03-04 PROCEDURE — 6360000004 HC RX CONTRAST MEDICATION: Performed by: EMERGENCY MEDICINE

## 2024-03-04 PROCEDURE — 81003 URINALYSIS AUTO W/O SCOPE: CPT

## 2024-03-04 PROCEDURE — 83690 ASSAY OF LIPASE: CPT

## 2024-03-04 PROCEDURE — 6360000002 HC RX W HCPCS: Performed by: EMERGENCY MEDICINE

## 2024-03-04 PROCEDURE — 99285 EMERGENCY DEPT VISIT HI MDM: CPT

## 2024-03-04 PROCEDURE — 85025 COMPLETE CBC W/AUTO DIFF WBC: CPT

## 2024-03-04 PROCEDURE — 84484 ASSAY OF TROPONIN QUANT: CPT

## 2024-03-04 RX ORDER — 0.9 % SODIUM CHLORIDE 0.9 %
1000 INTRAVENOUS SOLUTION INTRAVENOUS ONCE
Status: COMPLETED | OUTPATIENT
Start: 2024-03-04 | End: 2024-03-04

## 2024-03-04 RX ORDER — KETOROLAC TROMETHAMINE 30 MG/ML
30 INJECTION, SOLUTION INTRAMUSCULAR; INTRAVENOUS ONCE
Status: COMPLETED | OUTPATIENT
Start: 2024-03-04 | End: 2024-03-04

## 2024-03-04 RX ORDER — IBUPROFEN 800 MG/1
800 TABLET ORAL EVERY 8 HOURS PRN
Qty: 30 TABLET | Refills: 0 | Status: SHIPPED | OUTPATIENT
Start: 2024-03-04

## 2024-03-04 RX ORDER — CYCLOBENZAPRINE HCL 10 MG
10 TABLET ORAL 3 TIMES DAILY PRN
Qty: 21 TABLET | Refills: 0 | Status: SHIPPED | OUTPATIENT
Start: 2024-03-04 | End: 2024-03-14

## 2024-03-04 RX ADMIN — KETOROLAC TROMETHAMINE 30 MG: 30 INJECTION INTRAMUSCULAR; INTRAVENOUS at 09:05

## 2024-03-04 RX ADMIN — SODIUM CHLORIDE 1000 ML: 9 INJECTION, SOLUTION INTRAVENOUS at 09:07

## 2024-03-04 RX ADMIN — IOPAMIDOL 100 ML: 755 INJECTION, SOLUTION INTRAVENOUS at 09:34

## 2024-03-04 ASSESSMENT — ENCOUNTER SYMPTOMS
COUGH: 0
ABDOMINAL PAIN: 1
BLOOD IN STOOL: 0
VOMITING: 0
BACK PAIN: 0
EYE PAIN: 0
CONSTIPATION: 0
SHORTNESS OF BREATH: 0
DIARRHEA: 1
NAUSEA: 0

## 2024-03-04 ASSESSMENT — PAIN DESCRIPTION - LOCATION
LOCATION: BACK
LOCATION: BACK

## 2024-03-04 ASSESSMENT — PAIN SCALES - GENERAL
PAINLEVEL_OUTOF10: 5
PAINLEVEL_OUTOF10: 8

## 2024-03-04 ASSESSMENT — LIFESTYLE VARIABLES
HOW MANY STANDARD DRINKS CONTAINING ALCOHOL DO YOU HAVE ON A TYPICAL DAY: PATIENT DOES NOT DRINK
HOW OFTEN DO YOU HAVE A DRINK CONTAINING ALCOHOL: NEVER

## 2024-03-04 ASSESSMENT — PAIN - FUNCTIONAL ASSESSMENT: PAIN_FUNCTIONAL_ASSESSMENT: 0-10

## 2024-03-04 NOTE — TELEPHONE ENCOUNTER
Discussed with patient, states she had a urine test done last week. Per Jessika, results were negative and CT stable.  Appointment scheduled to discuss with Dr. More.

## 2024-03-04 NOTE — TELEPHONE ENCOUNTER
Recommend checking urine to r/o infection.     The patient should go to the ED if she develops fever, chills, nausea, vomiting, chest pain, SOB, calf pain, feelings of incomplete emptying, or should they otherwise feel they need evaluated

## 2024-03-04 NOTE — TELEPHONE ENCOUNTER
Patient states she has just not been feeling well, has been using heating pad and ibuprofen.  Reports no fever. Feels like she has bladder spasms when she urinates.  Recommendations?

## 2024-03-04 NOTE — ED PROVIDER NOTES
tenderness, left CVA tenderness or guarding.   Musculoskeletal:         General: No tenderness.      Cervical back: Normal range of motion.   Skin:     General: Skin is warm and dry.   Neurological:      Mental Status: She is alert and oriented to person, place, and time.   Psychiatric:         Behavior: Behavior normal.           DIFFERENTIAL DIAGNOSIS/ MDM:     Flank and abdominal pain we will do a workup    DIAGNOSTIC RESULTS     EKG: All EKG's are interpreted by the Emergency Department Physician who either signs or Co-signs this chart in the absence of a cardiologist.        RADIOLOGY:   CT ABDOMEN PELVIS W IV CONTRAST Additional Contrast? None   Final Result   No acute abdominal or pelvic abnormality.      Nonobstructing renal stones bilaterally.            I directly visualized the following  images and reviewed the radiologist interpretations:          ED BEDSIDE ULTRASOUND:       LABS:  Labs Reviewed   CBC WITH AUTO DIFFERENTIAL - Abnormal; Notable for the following components:       Result Value    MCHC 35.4 (*)     Neutrophils % 66 (*)     All other components within normal limits   COMPREHENSIVE METABOLIC PANEL W/ REFLEX TO MG FOR LOW K - Abnormal; Notable for the following components:    Glucose 256 (*)     Total Bilirubin 0.2 (*)     All other components within normal limits   URINALYSIS WITH REFLEX TO CULTURE - Abnormal; Notable for the following components:    Glucose, Ur 2+ (*)     Ketones, Urine TRACE (*)     All other components within normal limits   LIPASE   TROPONIN       Slightly elevated glucose otherwise labs unremarkable  EMERGENCY DEPARTMENT COURSE:   Vitals:    Vitals:    03/04/24 0848 03/04/24 0849   BP:  (!) 170/103   Pulse: (!) 118    Resp: 18    Temp: 98.9 °F (37.2 °C)    TempSrc: Tympanic    SpO2: 98%    Weight: 61.2 kg (135 lb)      -------------------------  BP: (!) 170/103, Temp: 98.9 °F (37.2 °C), Pulse: (!) 118, Respirations: 18      Re-evaluation Notes    Resting comfortably I

## 2024-03-05 NOTE — TELEPHONE ENCOUNTER
From: Ines Bravo  To: Leeanna White DO  Sent: 4/10/2019 9:15 PM EDT  Subject: Prescription Question    When I was taking it in the morning. I felt like crap 4 hours later. My readings would be 120-150. But I felt like I was having a low. (As I used to have low blood sugar and know how it feels, shaking, sweating out of it) so they I started taking the medicine at lunch time and felt better but when I wake up in the morning my readings are still high. 180-220. So having a hard time regulating. .. I don't know what to do. I'm scared to have a low at night because my dad has a few times. .. What do u think? I also got into Dr Gill Burton and got the scope and bravo test done should be getting results back very soon. Thanks  ----- Message -----  From: Leeanna White DO  Sent: 4/4/2019 6:06 PM EDT  To: Ines Bravo  Subject: RE: Prescription Question    Emily,    I'm so sorry that I did not see your message - I do believe that your body can be accustomed to higher glucose levels, so that when it does decrease to more normal ranges, you actually feel \"low\". Do you have any updated readings for me from the past 1-2 weeks? Are you still having the shakiness and sweats? How are you currently taking the Glimepiride?     Thanks,  Dr. Aggie Stewart        ----- Message -----   From: Ines Bravo   Sent: 3/7/2019 3:21 PM EST   To: Leeanna White DO  Subject: Prescription Question    So is it possible for my body to have gotten used to the higher sugar because the last couple days I've taken it before lunch it's been 120 and 112 and I've been crashing today especially my hands were shaking and I was sweating for over an hour and that was after I had some cheese and little turkey and ham bitsI was thinking about taking half in the morning and maybe half at lunch because when I wake up my sugar has still been about 150 to 180 what's your thoughts
fair minus

## 2024-03-11 ENCOUNTER — OFFICE VISIT (OUTPATIENT)
Dept: UROLOGY | Age: 44
End: 2024-03-11
Payer: COMMERCIAL

## 2024-03-11 VITALS
SYSTOLIC BLOOD PRESSURE: 144 MMHG | BODY MASS INDEX: 23.92 KG/M2 | OXYGEN SATURATION: 97 % | DIASTOLIC BLOOD PRESSURE: 100 MMHG | HEIGHT: 63 IN | RESPIRATION RATE: 12 BRPM | WEIGHT: 135 LBS | HEART RATE: 95 BPM

## 2024-03-11 DIAGNOSIS — R10.2 PELVIC PAIN: ICD-10-CM

## 2024-03-11 DIAGNOSIS — N20.0 NEPHROLITHIASIS: Primary | ICD-10-CM

## 2024-03-11 DIAGNOSIS — N39.0 RECURRENT UTI: ICD-10-CM

## 2024-03-11 PROCEDURE — 99214 OFFICE O/P EST MOD 30 MIN: CPT | Performed by: UROLOGY

## 2024-03-11 RX ORDER — CIPROFLOXACIN 500 MG/1
500 TABLET, FILM COATED ORAL 2 TIMES DAILY
Qty: 20 TABLET | Refills: 0 | Status: SHIPPED | OUTPATIENT
Start: 2024-03-11 | End: 2024-03-21

## 2024-03-11 RX ORDER — OXYBUTYNIN CHLORIDE 10 MG/1
10 TABLET, EXTENDED RELEASE ORAL DAILY
Qty: 30 TABLET | Refills: 3 | Status: SHIPPED | OUTPATIENT
Start: 2024-03-11

## 2024-03-22 ENCOUNTER — HOSPITAL ENCOUNTER (OUTPATIENT)
Dept: GENERAL RADIOLOGY | Age: 44
Discharge: HOME | End: 2024-03-22
Payer: COMMERCIAL

## 2024-03-22 DIAGNOSIS — N20.0 NEPHROLITHIASIS: ICD-10-CM

## 2024-03-22 PROCEDURE — 74018 RADEX ABDOMEN 1 VIEW: CPT

## 2024-03-25 ENCOUNTER — OFFICE VISIT (OUTPATIENT)
Dept: UROLOGY | Age: 44
End: 2024-03-25
Payer: COMMERCIAL

## 2024-03-25 VITALS
RESPIRATION RATE: 12 BRPM | BODY MASS INDEX: 24.8 KG/M2 | OXYGEN SATURATION: 96 % | HEART RATE: 90 BPM | HEIGHT: 63 IN | WEIGHT: 140 LBS | SYSTOLIC BLOOD PRESSURE: 130 MMHG | DIASTOLIC BLOOD PRESSURE: 86 MMHG

## 2024-03-25 DIAGNOSIS — N20.0 NEPHROLITHIASIS: Primary | ICD-10-CM

## 2024-03-25 DIAGNOSIS — N39.0 RECURRENT UTI: ICD-10-CM

## 2024-03-25 PROCEDURE — 99214 OFFICE O/P EST MOD 30 MIN: CPT | Performed by: UROLOGY

## 2024-03-25 NOTE — PROGRESS NOTES
Blood Pressure Father     Cancer Father         Bladder     Outpatient Medications Marked as Taking for the 3/25/24 encounter (Office Visit) with Cornelius More MD   Medication Sig Dispense Refill    ibuprofen (ADVIL;MOTRIN) 800 MG tablet Take 1 tablet by mouth every 8 hours as needed for Pain 30 tablet 0    dicyclomine (BENTYL) 10 MG capsule Take 1 capsule by mouth 4 times daily as needed (for lower GI symptoms) 120 capsule 3    Dulaglutide (TRULICITY) 0.75 MG/0.5ML SOPN Inject 0.75 mg into the skin once a week 6 mL 3    nystatin (MYCOSTATIN) 855858 UNIT/GM cream Apply topically to external ear up to 2 times daily as needed. 30 g 1    fluticasone (FLONASE) 50 MCG/ACT nasal spray 1-2 sprays by Nasal route daily 48 g 3    esomeprazole (NEXIUM) 40 MG delayed release capsule Take 1 capsule by mouth daily 90 capsule 3    famotidine (PEPCID) 20 MG tablet Take 1 tablet by mouth nightly 90 tablet 3    loratadine (CLARITIN) 10 MG tablet Take 1 tablet by mouth nightly      NONFORMULARY Take 1 capsule by mouth THRIVE capsule, nutritional shake and patch         Latex, Amoxil [amoxicillin], Bactrim [sulfamethoxazole-trimethoprim], Cantaloupe (diagnostic), Clindamycin/lincomycin, Flagyl [metronidazole], Flomax [tamsulosin hcl], Biaxin [clarithromycin], Ceftin [cefuroxime axetil], and Keflex [cephalexin]  Social History     Tobacco Use   Smoking Status Former    Current packs/day: 0.00    Average packs/day: 0.5 packs/day for 10.0 years (5.0 ttl pk-yrs)    Types: Cigarettes    Start date: 2012    Quit date: 11/2020    Years since quitting: 3.3   Smokeless Tobacco Never   Tobacco Comments    vaping      (If patient a smoker, smoking cessation counseling offered)   Social History     Substance and Sexual Activity   Alcohol Use Not Currently    Comment: rare       REVIEW OF SYSTEMS:  Constitutional: negative  Eyes: negative  Respiratory: negative  Cardiovascular: negative  Gastrointestinal: negative  Genitourinary: see

## 2024-05-08 ENCOUNTER — OFFICE VISIT (OUTPATIENT)
Dept: PRIMARY CARE CLINIC | Age: 44
End: 2024-05-08
Payer: COMMERCIAL

## 2024-05-08 VITALS
SYSTOLIC BLOOD PRESSURE: 140 MMHG | OXYGEN SATURATION: 99 % | DIASTOLIC BLOOD PRESSURE: 98 MMHG | TEMPERATURE: 99.4 F | HEIGHT: 63 IN | HEART RATE: 99 BPM | BODY MASS INDEX: 24.8 KG/M2 | WEIGHT: 140 LBS

## 2024-05-08 DIAGNOSIS — H60.501 ACUTE OTITIS EXTERNA OF RIGHT EAR, UNSPECIFIED TYPE: ICD-10-CM

## 2024-05-08 DIAGNOSIS — H66.92 LEFT OTITIS MEDIA, UNSPECIFIED OTITIS MEDIA TYPE: Primary | ICD-10-CM

## 2024-05-08 PROCEDURE — 99213 OFFICE O/P EST LOW 20 MIN: CPT | Performed by: FAMILY MEDICINE

## 2024-05-08 RX ORDER — CIPROFLOXACIN AND DEXAMETHASONE 3; 1 MG/ML; MG/ML
4 SUSPENSION/ DROPS AURICULAR (OTIC) 2 TIMES DAILY
Qty: 1 EACH | Refills: 0 | Status: SHIPPED | OUTPATIENT
Start: 2024-05-08 | End: 2024-05-15

## 2024-05-08 RX ORDER — CEFDINIR 300 MG/1
300 CAPSULE ORAL 2 TIMES DAILY
Qty: 20 CAPSULE | Refills: 0 | Status: SHIPPED | OUTPATIENT
Start: 2024-05-08 | End: 2024-05-18

## 2024-05-08 ASSESSMENT — PATIENT HEALTH QUESTIONNAIRE - PHQ9
SUM OF ALL RESPONSES TO PHQ QUESTIONS 1-9: 0
1. LITTLE INTEREST OR PLEASURE IN DOING THINGS: NOT AT ALL
SUM OF ALL RESPONSES TO PHQ QUESTIONS 1-9: 0
2. FEELING DOWN, DEPRESSED OR HOPELESS: NOT AT ALL
SUM OF ALL RESPONSES TO PHQ QUESTIONS 1-9: 0
SUM OF ALL RESPONSES TO PHQ9 QUESTIONS 1 & 2: 0
SUM OF ALL RESPONSES TO PHQ QUESTIONS 1-9: 0

## 2024-05-08 NOTE — PROGRESS NOTES
[place] [address]                        Telephone [phone]             Fax [phone]       Emily León  :  1980  Age:  44 y.o.   MRN:  [id number]  Date of visit:  2024       Assessment & Plan:    1. Left otitis media, unspecified otitis media type  - cefdinir (OMNICEF) 300 MG capsule; Take 1 capsule by mouth 2 times daily for 10 days  Dispense: 20 capsule; Refill: 0    2. Acute otitis externa of right ear, unspecified type  - ciprofloxacin-dexAMETHasone (CIPRODEX) 0.3-0.1 % otic suspension; Place 4 drops into the left ear 2 times daily for 7 days  Dispense: 1 each; Refill: 0    She was advised to follow up if symptoms worsen or do not resolve.         Subjective:    Emily León is a 44 y.o. female who presents to [facility] today (2024) for evaluation of:  Otalgia (Sx began 3 days ago)    She reports pain in both ears.   The right ear is more painful.   She states that she has had symptoms for the past 3 days.  She has not had a fever.   She has had episodes of otitis externa previously.    Current medications are:  Current Outpatient Medications   Medication Sig Dispense Refill    ibuprofen (ADVIL;MOTRIN) 800 MG tablet Take 1 tablet by mouth every 8 hours as needed for Pain 30 tablet 0    dicyclomine (BENTYL) 10 MG capsule Take 1 capsule by mouth 4 times daily as needed (for lower GI symptoms) 120 capsule 3    Dulaglutide (TRULICITY) 0.75 MG/0.5ML SOPN Inject 0.75 mg into the skin once a week 6 mL 3    fluticasone (FLONASE) 50 MCG/ACT nasal spray 1-2 sprays by Nasal route daily 48 g 3    esomeprazole (NEXIUM) 40 MG delayed release capsule Take 1 capsule by mouth daily 90 capsule 3    famotidine (PEPCID) 20 MG tablet Take 1 tablet by mouth nightly 90 tablet 3    loratadine (CLARITIN) 10 MG tablet Take 1 tablet by mouth nightly      NONFORMULARY Take 1 capsule by mouth

## 2024-05-21 DIAGNOSIS — E11.9 TYPE 2 DIABETES MELLITUS WITHOUT COMPLICATION, WITHOUT LONG-TERM CURRENT USE OF INSULIN (HCC): ICD-10-CM

## 2024-05-21 NOTE — TELEPHONE ENCOUNTER
Emily called requesting a refill of the below medication which has been pended for you:     Requested Prescriptions     Pending Prescriptions Disp Refills    dulaglutide (TRULICITY) 0.75 MG/0.5ML SOPN SC injection 6 mL 3     Sig: Inject 0.5 mLs into the skin once a week       Last Appointment Date: 2/19/2024  Next Appointment Date: 8/21/2024    Allergies   Allergen Reactions    Latex Dermatitis    Amoxil [Amoxicillin] Diarrhea    Bactrim [Sulfamethoxazole-Trimethoprim] Itching    Cantaloupe (Diagnostic) Swelling     Tongue swells    Clindamycin/Lincomycin Nausea And Vomiting    Flagyl [Metronidazole] Other (See Comments)     Sees bugs crawling    Flomax [Tamsulosin Hcl] Nausea Only and Other (See Comments)     Lightheaded, couldn't walk by herself    Biaxin [Clarithromycin] Nausea And Vomiting    Ceftin [Cefuroxime Axetil] Diarrhea and Nausea And Vomiting    Keflex [Cephalexin] Nausea And Vomiting

## 2024-05-24 RX ORDER — DULAGLUTIDE 0.75 MG/.5ML
0.75 INJECTION, SOLUTION SUBCUTANEOUS WEEKLY
Qty: 6 ML | Refills: 3 | Status: SHIPPED | OUTPATIENT
Start: 2024-05-24

## 2024-06-03 ENCOUNTER — OFFICE VISIT (OUTPATIENT)
Dept: NEPHROLOGY | Age: 44
End: 2024-06-03
Payer: COMMERCIAL

## 2024-06-03 VITALS
HEART RATE: 64 BPM | SYSTOLIC BLOOD PRESSURE: 138 MMHG | HEIGHT: 63 IN | DIASTOLIC BLOOD PRESSURE: 72 MMHG | BODY MASS INDEX: 24.63 KG/M2 | WEIGHT: 139 LBS

## 2024-06-03 DIAGNOSIS — Z87.440 HISTORY OF RECURRENT UTIS: ICD-10-CM

## 2024-06-03 DIAGNOSIS — N20.0 NEPHROLITHIASIS: Primary | ICD-10-CM

## 2024-06-03 DIAGNOSIS — E11.9 TYPE 2 DIABETES MELLITUS WITHOUT COMPLICATION, WITHOUT LONG-TERM CURRENT USE OF INSULIN (HCC): ICD-10-CM

## 2024-06-03 PROCEDURE — 99203 OFFICE O/P NEW LOW 30 MIN: CPT | Performed by: INTERNAL MEDICINE

## 2024-06-03 PROCEDURE — 3051F HG A1C>EQUAL 7.0%<8.0%: CPT | Performed by: INTERNAL MEDICINE

## 2024-06-03 NOTE — PROGRESS NOTES
Will likely require initiation of chlorthalidone.  5.  Okay to squeeze and consume 1 fresh lemon or lime daily.  6.  Avoid any NSAIDS   7.  Return to clinic in 6 to 8 weeks.    Thank you for the consultation.  Please do not hesitate to call with questions.    Electronically signed by Robin Brooks MD on 6/3/2024 at 2:53 PM  [facility]     This note is created with the assistance of a speech-recognition program. While intending to generate a document that actually reflects the content of the visit, no guarantees can be provided that every mistake has been identified and corrected by editing.

## 2024-06-14 DIAGNOSIS — R10.9 ABDOMINAL CRAMPING: ICD-10-CM

## 2024-06-14 RX ORDER — DICYCLOMINE HYDROCHLORIDE 10 MG/1
CAPSULE ORAL
Qty: 120 CAPSULE | Refills: 3 | OUTPATIENT
Start: 2024-06-14

## 2024-06-24 ENCOUNTER — ANESTHESIA EVENT (OUTPATIENT)
Dept: OPERATING ROOM | Age: 44
End: 2024-06-24
Payer: COMMERCIAL

## 2024-06-24 ENCOUNTER — HOSPITAL ENCOUNTER (OUTPATIENT)
Age: 44
Setting detail: OUTPATIENT SURGERY
Discharge: HOME | End: 2024-06-24
Attending: UROLOGY | Admitting: UROLOGY
Payer: COMMERCIAL

## 2024-06-24 ENCOUNTER — ANESTHESIA (OUTPATIENT)
Dept: OPERATING ROOM | Age: 44
End: 2024-06-24
Payer: COMMERCIAL

## 2024-06-24 VITALS
SYSTOLIC BLOOD PRESSURE: 142 MMHG | BODY MASS INDEX: 24.84 KG/M2 | DIASTOLIC BLOOD PRESSURE: 96 MMHG | RESPIRATION RATE: 16 BRPM | HEIGHT: 63 IN | TEMPERATURE: 98 F | WEIGHT: 140.2 LBS | OXYGEN SATURATION: 95 % | HEART RATE: 77 BPM

## 2024-06-24 DIAGNOSIS — G89.18 POSTOPERATIVE PAIN: Primary | ICD-10-CM

## 2024-06-24 PROCEDURE — 7100000010 HC PHASE II RECOVERY - FIRST 15 MIN: Performed by: UROLOGY

## 2024-06-24 PROCEDURE — 3600000013 HC SURGERY LEVEL 3 ADDTL 15MIN: Performed by: UROLOGY

## 2024-06-24 PROCEDURE — 3700000001 HC ADD 15 MINUTES (ANESTHESIA): Performed by: UROLOGY

## 2024-06-24 PROCEDURE — 3600000003 HC SURGERY LEVEL 3 BASE: Performed by: UROLOGY

## 2024-06-24 PROCEDURE — 7100000011 HC PHASE II RECOVERY - ADDTL 15 MIN: Performed by: UROLOGY

## 2024-06-24 PROCEDURE — 6360000002 HC RX W HCPCS: Performed by: NURSE ANESTHETIST, CERTIFIED REGISTERED

## 2024-06-24 PROCEDURE — 3700000000 HC ANESTHESIA ATTENDED CARE: Performed by: UROLOGY

## 2024-06-24 PROCEDURE — 2580000003 HC RX 258: Performed by: UROLOGY

## 2024-06-24 RX ORDER — ACETAMINOPHEN AND CODEINE PHOSPHATE 300; 30 MG/1; MG/1
1 TABLET ORAL EVERY 4 HOURS PRN
Qty: 12 TABLET | Refills: 0 | Status: SHIPPED | OUTPATIENT
Start: 2024-06-24 | End: 2024-06-27

## 2024-06-24 RX ORDER — SODIUM CHLORIDE 9 MG/ML
INJECTION, SOLUTION INTRAVENOUS PRN
Status: DISCONTINUED | OUTPATIENT
Start: 2024-06-24 | End: 2024-06-24 | Stop reason: HOSPADM

## 2024-06-24 RX ORDER — DEXAMETHASONE SODIUM PHOSPHATE 10 MG/ML
INJECTION INTRAMUSCULAR; INTRAVENOUS PRN
Status: DISCONTINUED | OUTPATIENT
Start: 2024-06-24 | End: 2024-06-24 | Stop reason: SDUPTHER

## 2024-06-24 RX ORDER — FENTANYL CITRATE 50 UG/ML
INJECTION, SOLUTION INTRAMUSCULAR; INTRAVENOUS PRN
Status: DISCONTINUED | OUTPATIENT
Start: 2024-06-24 | End: 2024-06-24 | Stop reason: SDUPTHER

## 2024-06-24 RX ORDER — LEVOFLOXACIN 5 MG/ML
500 INJECTION, SOLUTION INTRAVENOUS
Status: DISCONTINUED | OUTPATIENT
Start: 2024-06-24 | End: 2024-06-24 | Stop reason: HOSPADM

## 2024-06-24 RX ORDER — PROPOFOL 10 MG/ML
INJECTION, EMULSION INTRAVENOUS PRN
Status: DISCONTINUED | OUTPATIENT
Start: 2024-06-24 | End: 2024-06-24 | Stop reason: SDUPTHER

## 2024-06-24 RX ORDER — SODIUM CHLORIDE, SODIUM LACTATE, POTASSIUM CHLORIDE, CALCIUM CHLORIDE 600; 310; 30; 20 MG/100ML; MG/100ML; MG/100ML; MG/100ML
INJECTION, SOLUTION INTRAVENOUS CONTINUOUS
Status: DISCONTINUED | OUTPATIENT
Start: 2024-06-24 | End: 2024-06-24 | Stop reason: HOSPADM

## 2024-06-24 RX ORDER — SODIUM CHLORIDE 0.9 % (FLUSH) 0.9 %
5-40 SYRINGE (ML) INJECTION EVERY 12 HOURS SCHEDULED
Status: DISCONTINUED | OUTPATIENT
Start: 2024-06-24 | End: 2024-06-24 | Stop reason: HOSPADM

## 2024-06-24 RX ORDER — KETOROLAC TROMETHAMINE 30 MG/ML
INJECTION, SOLUTION INTRAMUSCULAR; INTRAVENOUS PRN
Status: DISCONTINUED | OUTPATIENT
Start: 2024-06-24 | End: 2024-06-24 | Stop reason: SDUPTHER

## 2024-06-24 RX ORDER — MIDAZOLAM HYDROCHLORIDE 1 MG/ML
INJECTION INTRAMUSCULAR; INTRAVENOUS PRN
Status: DISCONTINUED | OUTPATIENT
Start: 2024-06-24 | End: 2024-06-24 | Stop reason: SDUPTHER

## 2024-06-24 RX ORDER — ONDANSETRON 2 MG/ML
INJECTION INTRAMUSCULAR; INTRAVENOUS PRN
Status: DISCONTINUED | OUTPATIENT
Start: 2024-06-24 | End: 2024-06-24 | Stop reason: SDUPTHER

## 2024-06-24 RX ORDER — SODIUM CHLORIDE 0.9 % (FLUSH) 0.9 %
5-40 SYRINGE (ML) INJECTION PRN
Status: DISCONTINUED | OUTPATIENT
Start: 2024-06-24 | End: 2024-06-24 | Stop reason: HOSPADM

## 2024-06-24 RX ADMIN — ONDANSETRON 4 MG: 2 INJECTION INTRAMUSCULAR; INTRAVENOUS at 14:53

## 2024-06-24 RX ADMIN — MIDAZOLAM HYDROCHLORIDE 2 MG: 1 INJECTION, SOLUTION INTRAMUSCULAR; INTRAVENOUS at 14:42

## 2024-06-24 RX ADMIN — PROPOFOL 150 MCG/KG/MIN: 10 INJECTION, EMULSION INTRAVENOUS at 14:42

## 2024-06-24 RX ADMIN — DEXAMETHASONE SODIUM PHOSPHATE 10 MG: 10 INJECTION INTRAMUSCULAR; INTRAVENOUS at 14:53

## 2024-06-24 RX ADMIN — FENTANYL CITRATE 50 MCG: 50 INJECTION, SOLUTION INTRAMUSCULAR; INTRAVENOUS at 14:42

## 2024-06-24 RX ADMIN — KETOROLAC TROMETHAMINE 30 MG: 30 INJECTION, SOLUTION INTRAMUSCULAR; INTRAVENOUS at 15:22

## 2024-06-24 RX ADMIN — SODIUM CHLORIDE, POTASSIUM CHLORIDE, SODIUM LACTATE AND CALCIUM CHLORIDE: 600; 310; 30; 20 INJECTION, SOLUTION INTRAVENOUS at 13:20

## 2024-06-24 RX ADMIN — FENTANYL CITRATE 50 MCG: 50 INJECTION, SOLUTION INTRAMUSCULAR; INTRAVENOUS at 15:10

## 2024-06-24 RX ADMIN — PROPOFOL 200 MG: 10 INJECTION, EMULSION INTRAVENOUS at 14:40

## 2024-06-24 ASSESSMENT — PAIN DESCRIPTION - DESCRIPTORS: DESCRIPTORS: ACHING

## 2024-06-24 ASSESSMENT — PAIN SCALES - GENERAL
PAINLEVEL_OUTOF10: 0

## 2024-06-24 ASSESSMENT — PAIN - FUNCTIONAL ASSESSMENT
PAIN_FUNCTIONAL_ASSESSMENT: 0-10
PAIN_FUNCTIONAL_ASSESSMENT: ADULT NONVERBAL PAIN SCALE (NPVS)
PAIN_FUNCTIONAL_ASSESSMENT: ADULT NONVERBAL PAIN SCALE (NPVS)

## 2024-06-24 NOTE — ANESTHESIA POSTPROCEDURE EVALUATION
Department of Anesthesiology  Postprocedure Note    Patient: Emily León  MRN: [id number]  YOB: 1980  Date of evaluation: 6/24/2024    Procedure Summary       Date: 06/24/24 Room / Location: [address]    Anesthesia Start: 1439 Anesthesia Stop: 1545    Procedure: Left LITHOTRIPSY Diagnosis:       Kidney stone on left side      (Kidney stone on left side [N20.0])    Surgeons: Cornelius More MD Responsible Provider: Keith Davies APRN - CRNA    Anesthesia Type: General, TIVA ASA Status: 2            Anesthesia Type: General, TIVA    Phong Phase I: Phong Score: 10    Phong Phase II: Phong Score: 9    Anesthesia Post Evaluation    Patient location during evaluation: bedside  Level of consciousness: sleepy but conscious  Airway patency: patent  Nausea & Vomiting: no nausea and no vomiting  Cardiovascular status: hemodynamically stable  Respiratory status: spontaneous ventilation  Hydration status: stable  Pain management: satisfactory to patient    No notable events documented.

## 2024-06-24 NOTE — OP NOTE
Cornelius More MD.  Urologic Surgery      [place], [place]    DATE: 6/24/2024  Patient:  Emily León  MRN: [id number]  YOB: 1980    SURGEON: Cornelius More MD.    ASSISTANT: none    PREOPERATIVE DIAGNOSIS:   left kidney stones    POSTOPERATIVE DIAGNOSIS:   left kidney stones    PROCEDURE PERFORMED: left Extracorporeal Shock Wave Lithotripsy    ANESTHESIA: Monitor Anesthesia Care    COMPLICATIONS: none    OR BLOOD LOSS:  Minimal    FLUIDS: Cystalloids per Anesthesia    SPECIMENS:  * No specimens in log *  none    DRAINS: none    INDICATIONS FOR PROCEDURE:  The patient is a 44 y.o. female who presents today with Kidney stone on left side [N20.0] here for Left LITHOTRIPSY ([facility] [id number]). After risks, benefits and alternatives of the procedure were discussed with the patient, the patient elected to proceed.     DETAILS OF PROCEDURE:  After informed consent was obtained in the preoperative area, the patient was taken back to the operating room and transferred to the lithotripsy table in supine position.  Anesthesia was induced and antibiotics were given.  A timeout occurred.  Two patient identifiers were used.     The patient's Left flank was placed over the shock-wave device. Spot x-ray images located the stone which was located in the mid-pole.. The patient's flank was then coupled to the lithotriptor.     The kidney was pre-treated with 200 shocks. A 2 minute pause was not conducted. A further 2800 shocks were delivered to the kidney at a shock rate of 120 Hz. Periodically spot xray images were taken to ensure the stone was appropriately targeted.     A total of 3000 shocks were delivered. At that time, the stone demonstrated good fragmentation.     The patient's flank was inspected and there was no evidence of hemorrhage. The patient was then awaken and transferred back to the hospital bed, and discharged back to the PACU in good and stable condition.

## 2024-06-24 NOTE — H&P
CORNELIUS MORE MD  History and Physical    Patient:  Emily León  MRN: [id number]  YOB: 1980    HISTORY OF PRESENT ILLNESS:     The patient is a 44 y.o. female who presents with kidney sotnes. Here for procedure.    Patient's old records, notes and chart reviewed and summarized above.     CORNELIUS MORE MD independently reviewed the images and verified the radiology reports from:    No results found.      Past Medical History:    Past Medical History:   Diagnosis Date    Allergic rhinitis     COVID 12/27/2021    Diabetes mellitus (HCC)     diet control    GERD (gastroesophageal reflux disease)     GERD (gastroesophageal reflux disease)     Hemorrhoids     Hyperlipidemia     Hypoglycemia     Kidney stones     Prolonged emergence from general anesthesia     Tobacco abuse     UTI (urinary tract infection)     frequency       Past Surgical History:    Past Surgical History:   Procedure Laterality Date    CARPAL TUNNEL RELEASE Left     COLONOSCOPY N/A 03/01/2022    normal, Dr. Sommers at [facility]    CYSTOSCOPY Bilateral 9/11/2023    Cystoscopy Bilateral Ureteroscopy, Holmium laser, right stone basketing,  and bilateral stent placement performed by Cornelius More MD at [facility] OR    ENDOMETRIAL ABLATION      HYSTERECTOMY (CERVIX STATUS UNKNOWN) N/A 03/11/2022    ROBOTIC ASSITED TOTAL LAPAROSCOPIC HYSTERECTOMY BILATERAL SALPINGECTOMY performed by Barber Herrmann MD at [facility] OR    LITHOTRIPSY      NASAL SEPTUM SURGERY      OTHER SURGICAL HISTORY Right 09/07/2016    laser ureteroscopic lithotripsy    MS EGD TRANSORAL BIOPSY SINGLE/MULTIPLE N/A 03/13/2017    EGD BIOPSY performed by Rajiv Sommers MD at [facility] OR    REFRACTIVE SURGERY Bilateral     approximately 2013    TUBAL LIGATION      UPPER GASTROINTESTINAL ENDOSCOPY  2010    hiatal hernia     UPPER GASTROINTESTINAL ENDOSCOPY  03/13/2017    mild gastritis, Dr. Sommers    UPPER GASTROINTESTINAL ENDOSCOPY N/A 03/28/2019    EGD BIOPSY W/48 HR GASCA performed by Rajiv Sommers MD at

## 2024-06-24 NOTE — DISCHARGE INSTRUCTIONS
Post op Care:  Shock wave lithotripsy    - You may have blood present within the urine intermittently with a stent in place.    - You may also may notice bruising of the flank / back, on the side that that was treated.  - It is important to drink plenty of fluids after this procedure to help facilitate passage of fragmented stone.  - You may take Tylenol and Motrin as needed for pain.  -Emily will be scheduled for a follow up appointment with an Abdominal Xray.  The office will contact you regarding a date and time.  -Emily has no activity restrictions following this procedure.    Post Operative UROLOGY Instructions:    It is very important to increase your fluid intake for the first few days - water is preferred.  You may experience slight discomfort with urination for 1-2 days following surgery.  Your urine may also be colored red.        Take medications as prescribed.  If you are experiencing only minor discomfort, you may take Tylenol or any non-prescription pain medication.    Activity  You have received sedation. Your judgement and coordination may be impaired.  -Do not drive or operate any machinery today.  -Do not make personal, medical, legal, or business decisions today.  -Do not consume alcohol, tranquilizers, sleeping or non-prescription medications today, unless indicated by your physician.  -You may resume normal activities after 24 hours and return to work unless otherwise stated by your doctor.    Diet  Unless otherwise instructed, begin with clear liquids and progress to your normal diet if you are not nauseated.    Medications  -resume your usual medications unless otherwise instructed.    Physician Follow up  -follow up with your doctor at your next scheduled appointment.      Call Your Physician If You Experience Any of the Following  -Fever >100.5, Chills, Excessive sweating.  -Any redness or swelling at the IV site.  -Persistant nausea or vomiting  -Severe abdominal or chest pain  -You are unable

## 2024-06-24 NOTE — BRIEF OP NOTE
Brief Postoperative Note      Patient: Emily León  YOB: 1980  MRN: [id number]    Date of Procedure: 6/24/2024    Pre-Op Diagnosis Codes:     * Kidney stone on left side [N20.0]    Post-Op Diagnosis: Same       Procedure(s):  Left LITHOTRIPSY ([facility] [id number])    Surgeon(s):  Cornelius More MD    Assistant:  * No surgical staff found *    Anesthesia: Monitor Anesthesia Care    Estimated Blood Loss (mL): Minimal    Complications: None    Specimens:   * No specimens in log *    Implants:  * No implants in log *      Drains: * No LDAs found *    Findings:  Kub 3.5-4 mo w evelia    Electronically signed by CORNELIUS MORE MD on 6/24/2024 at 2:44 PM

## 2024-06-24 NOTE — ANESTHESIA PRE PROCEDURE
Department of Anesthesiology  Preprocedure Note       Name:  Emily León   Age:  44 y.o.  :  1980                                          MRN:  [id number]         Date:  2024      Surgeon: Surgeon(s):  Cornelius More MD    Procedure: Procedure(s):  Left LITHOTRIPSY ([facility] [id number])    Medications prior to admission:   Prior to Admission medications    Medication Sig Start Date End Date Taking? Authorizing Provider   dulaglutide (TRULICITY) 0.75 MG/0.5ML SOPN SC injection Inject 0.5 mLs into the skin once a week 24   Lynda Eason DO   oxyBUTYnin (DITROPAN XL) 10 MG extended release tablet Take 1 tablet by mouth daily  Patient not taking: Reported on 3/25/2024 3/11/24   Cornelius More MD   ibuprofen (ADVIL;MOTRIN) 800 MG tablet Take 1 tablet by mouth every 8 hours as needed for Pain 3/4/24   Dano Muñiz MD   dicyclomine (BENTYL) 10 MG capsule Take 1 capsule by mouth 4 times daily as needed (for lower GI symptoms) 24   Lynda Eason DO   fluconazole (DIFLUCAN) 150 MG tablet Take 1 tab by mouth once; repeat dose again in 3 days.  Patient not taking: Reported on 3/25/2024 2/19/24   Lynda Eason DO   nystatin (MYCOSTATIN) 644139 UNIT/GM cream Apply topically to external ear up to 2 times daily as needed.  Patient not taking: Reported on 2024   Lynda Eason DO   fluticasone (FLONASE) 50 MCG/ACT nasal spray 1-2 sprays by Nasal route daily 23   Lynda Eason DO   esomeprazole (NEXIUM) 40 MG delayed release capsule Take 1 capsule by mouth daily 4/10/23   Lynda Eason DO   famotidine (PEPCID) 20 MG tablet Take 1 tablet by mouth nightly 4/10/23   Lynda Eason DO   loratadine (CLARITIN) 10 MG tablet Take 1 tablet by mouth nightly    Carlo Randolph MD   NONFORMULARY Take 1 capsule by mouth THRIVE capsule, nutritional shake and patch    Carlo Randolph MD       Current medications:    Current Facility-Administered Medications   Medication Dose

## 2024-06-27 ENCOUNTER — PATIENT MESSAGE (OUTPATIENT)
Dept: UROLOGY | Age: 44
End: 2024-06-27

## 2024-06-27 DIAGNOSIS — R11.0 NAUSEA: Primary | ICD-10-CM

## 2024-06-27 DIAGNOSIS — N20.0 NEPHROLITHIASIS: ICD-10-CM

## 2024-06-27 DIAGNOSIS — N20.0 NEPHROLITHIASIS: Primary | ICD-10-CM

## 2024-06-27 RX ORDER — CIPROFLOXACIN 500 MG/1
500 TABLET, FILM COATED ORAL 2 TIMES DAILY
Qty: 10 TABLET | Refills: 0 | Status: SHIPPED | OUTPATIENT
Start: 2024-06-27 | End: 2024-07-02

## 2024-06-27 RX ORDER — ONDANSETRON 4 MG/1
4 TABLET, FILM COATED ORAL DAILY PRN
Qty: 10 TABLET | Refills: 0 | Status: SHIPPED | OUTPATIENT
Start: 2024-06-27

## 2024-06-27 RX ORDER — TAMSULOSIN HYDROCHLORIDE 0.4 MG/1
0.4 CAPSULE ORAL DAILY
Qty: 10 CAPSULE | Refills: 0 | Status: SHIPPED | OUTPATIENT
Start: 2024-06-27 | End: 2024-07-07

## 2024-06-27 NOTE — TELEPHONE ENCOUNTER
Start Flomax to relax the ureter and pass any stone fragments that may be present.     Sending Zofran for nausea.    Start Ciprofloxacin 500 mg BID x 5 days for empiric coverage of upper  tract.

## 2024-06-27 NOTE — TELEPHONE ENCOUNTER
From: Emily León  To: Dr. Cornelius More  Sent: 6/27/2024 9:19 AM EDT  Subject: After surgery    I'm not doing well. I haven't been back to work. I'm still in pain and I feel nauseous. Is it possible I need an antibiotic? Should I still feel this way with surgery this past Monday?   Please let me know. Thanks Emily león

## 2024-07-01 ENCOUNTER — HOSPITAL ENCOUNTER (OUTPATIENT)
Age: 44
Setting detail: SPECIMEN
Discharge: HOME | End: 2024-07-01
Payer: COMMERCIAL

## 2024-07-01 ENCOUNTER — OFFICE VISIT (OUTPATIENT)
Dept: PRIMARY CARE CLINIC | Age: 44
End: 2024-07-01
Payer: COMMERCIAL

## 2024-07-01 ENCOUNTER — HOSPITAL ENCOUNTER (OUTPATIENT)
Dept: GENERAL RADIOLOGY | Age: 44
Discharge: HOME | End: 2024-07-03
Payer: COMMERCIAL

## 2024-07-01 VITALS
TEMPERATURE: 98.9 F | SYSTOLIC BLOOD PRESSURE: 130 MMHG | BODY MASS INDEX: 25.43 KG/M2 | HEIGHT: 63 IN | HEART RATE: 85 BPM | RESPIRATION RATE: 16 BRPM | WEIGHT: 143.5 LBS | DIASTOLIC BLOOD PRESSURE: 90 MMHG | OXYGEN SATURATION: 99 %

## 2024-07-01 DIAGNOSIS — R10.9 BILATERAL FLANK PAIN: Primary | ICD-10-CM

## 2024-07-01 DIAGNOSIS — R10.9 BILATERAL FLANK PAIN: ICD-10-CM

## 2024-07-01 DIAGNOSIS — K21.9 LPRD (LARYNGOPHARYNGEAL REFLUX DISEASE): ICD-10-CM

## 2024-07-01 LAB
ALBUMIN SERPL-MCNC: 4.3 G/DL (ref 3.5–5.2)
ALBUMIN/GLOB SERPL: 1.5 {RATIO} (ref 1–2.5)
ALP SERPL-CCNC: 59 U/L (ref 35–104)
ALT SERPL-CCNC: 34 U/L (ref 5–33)
ANION GAP SERPL CALCULATED.3IONS-SCNC: 11 MMOL/L (ref 9–17)
AST SERPL-CCNC: 20 U/L
BACTERIA URNS QL MICRO: ABNORMAL
BASOPHILS # BLD: 0.04 K/UL (ref 0–0.2)
BASOPHILS NFR BLD: 0 % (ref 0–2)
BILIRUB SERPL-MCNC: 0.4 MG/DL (ref 0.3–1.2)
BILIRUB UR QL STRIP: NEGATIVE
BUN SERPL-MCNC: 16 MG/DL (ref 6–20)
BUN/CREAT SERPL: 23 (ref 9–20)
CALCIUM SERPL-MCNC: 9.6 MG/DL (ref 8.6–10.4)
CHARACTER UR: ABNORMAL
CHLORIDE SERPL-SCNC: 100 MMOL/L (ref 98–107)
CLARITY UR: CLEAR
CO2 SERPL-SCNC: 25 MMOL/L (ref 20–31)
COLOR UR: YELLOW
CREAT SERPL-MCNC: 0.7 MG/DL (ref 0.5–0.9)
EOSINOPHIL # BLD: 0.21 K/UL (ref 0–0.44)
EOSINOPHILS RELATIVE PERCENT: 2 % (ref 1–4)
EPI CELLS #/AREA URNS HPF: ABNORMAL /HPF (ref 0–5)
ERYTHROCYTE [DISTWIDTH] IN BLOOD BY AUTOMATED COUNT: 12.2 % (ref 11.8–14.4)
GFR, ESTIMATED: >90 ML/MIN/1.73M2
GLUCOSE SERPL-MCNC: 142 MG/DL (ref 70–99)
GLUCOSE UR STRIP-MCNC: NEGATIVE MG/DL
HCT VFR BLD AUTO: 38.5 % (ref 36.3–47.1)
HGB BLD-MCNC: 13.6 G/DL (ref 11.9–15.1)
HGB UR QL STRIP.AUTO: ABNORMAL
IMM GRANULOCYTES # BLD AUTO: 0.04 K/UL (ref 0–0.3)
IMM GRANULOCYTES NFR BLD: 0 %
KETONES UR STRIP-MCNC: NEGATIVE MG/DL
LEUKOCYTE ESTERASE UR QL STRIP: NEGATIVE
LYMPHOCYTES NFR BLD: 2.76 K/UL (ref 1.1–3.7)
LYMPHOCYTES RELATIVE PERCENT: 27 % (ref 24–43)
MCH RBC QN AUTO: 32.5 PG (ref 25.2–33.5)
MCHC RBC AUTO-ENTMCNC: 35.3 G/DL (ref 25.2–33.5)
MCV RBC AUTO: 92.1 FL (ref 82.6–102.9)
MONOCYTES NFR BLD: 0.48 K/UL (ref 0.1–1.2)
MONOCYTES NFR BLD: 5 % (ref 3–12)
NEUTROPHILS NFR BLD: 66 % (ref 36–65)
NEUTS SEG NFR BLD: 6.64 K/UL (ref 1.5–8.1)
NITRITE UR QL STRIP: NEGATIVE
NRBC BLD-RTO: 0 PER 100 WBC
PH UR STRIP: 6 [PH] (ref 5–6)
PLATELET # BLD AUTO: 217 K/UL (ref 138–453)
PMV BLD AUTO: 10.6 FL (ref 8.1–13.5)
POTASSIUM SERPL-SCNC: 4 MMOL/L (ref 3.7–5.3)
PROT SERPL-MCNC: 7.1 G/DL (ref 6.4–8.3)
PROT UR STRIP-MCNC: NEGATIVE MG/DL
RBC # BLD AUTO: 4.18 M/UL (ref 3.95–5.11)
RBC #/AREA URNS HPF: ABNORMAL /HPF (ref 0–4)
SODIUM SERPL-SCNC: 136 MMOL/L (ref 135–144)
SP GR UR STRIP: 1.01 (ref 1.01–1.02)
UROBILINOGEN UR STRIP-ACNC: NORMAL EU/DL (ref 0–1)
WBC #/AREA URNS HPF: ABNORMAL /HPF (ref 0–4)
WBC OTHER # BLD: 10.2 K/UL (ref 3.5–11.3)

## 2024-07-01 PROCEDURE — 80053 COMPREHEN METABOLIC PANEL: CPT

## 2024-07-01 PROCEDURE — 99214 OFFICE O/P EST MOD 30 MIN: CPT

## 2024-07-01 PROCEDURE — 81001 URINALYSIS AUTO W/SCOPE: CPT

## 2024-07-01 PROCEDURE — 74018 RADEX ABDOMEN 1 VIEW: CPT

## 2024-07-01 PROCEDURE — 36415 COLL VENOUS BLD VENIPUNCTURE: CPT

## 2024-07-01 PROCEDURE — 85025 COMPLETE CBC W/AUTO DIFF WBC: CPT

## 2024-07-01 PROCEDURE — 96372 THER/PROPH/DIAG INJ SC/IM: CPT

## 2024-07-01 RX ORDER — ESOMEPRAZOLE MAGNESIUM 40 MG/1
40 CAPSULE, DELAYED RELEASE ORAL DAILY
Qty: 90 CAPSULE | Refills: 3 | Status: SHIPPED | OUTPATIENT
Start: 2024-07-01

## 2024-07-01 RX ORDER — FLUCONAZOLE 150 MG/1
TABLET ORAL
Qty: 2 TABLET | Refills: 0 | Status: SHIPPED | OUTPATIENT
Start: 2024-07-01

## 2024-07-01 RX ORDER — ONDANSETRON 4 MG/1
4 TABLET, ORALLY DISINTEGRATING ORAL 3 TIMES DAILY PRN
Qty: 21 TABLET | Refills: 0 | Status: SHIPPED | OUTPATIENT
Start: 2024-07-01

## 2024-07-01 RX ORDER — CIPROFLOXACIN 500 MG/1
500 TABLET, FILM COATED ORAL 2 TIMES DAILY
Qty: 6 TABLET | Refills: 0 | Status: SHIPPED | OUTPATIENT
Start: 2024-07-01 | End: 2024-07-04

## 2024-07-01 RX ORDER — KETOROLAC TROMETHAMINE 30 MG/ML
30 INJECTION, SOLUTION INTRAMUSCULAR; INTRAVENOUS ONCE
Status: COMPLETED | OUTPATIENT
Start: 2024-07-01 | End: 2024-07-01

## 2024-07-01 RX ADMIN — KETOROLAC TROMETHAMINE 30 MG: 30 INJECTION, SOLUTION INTRAMUSCULAR; INTRAVENOUS at 19:24

## 2024-07-01 ASSESSMENT — ENCOUNTER SYMPTOMS: BOWEL INCONTINENCE: 0

## 2024-07-01 NOTE — PROGRESS NOTES
[facility] [facility] department of [facility]  [address] [facility] [address]  Phone: [phone]  Fax: [phone]      Emily León is a 44 y.o. female who presents to the [facility] today for her medical conditions/complaints as noted below. Emily León is c/o of Kidney Problem (She is here for bilateral kidney pain, she just had lithotripsy done on the left, she is still in pain. Tim told her to come to urgent care )          HPI:     Flank Pain  This is a recurrent problem. The current episode started 1 to 4 weeks ago (lithotripsy on 6/24.). The problem occurs constantly (ongoing since lithotripsy). The problem is unchanged. Pain location: bilateral flank pain. The quality of the pain is described as aching. The pain does not radiate. The pain is at a severity of 7/10. The pain is moderate. The symptoms are aggravated by position. Pertinent negatives include no bladder incontinence, bowel incontinence, dysuria, fever or paresthesias. She has tried NSAIDs, ice and heat (tylenol) for the symptoms. The treatment provided mild relief.       Past Medical History:   Diagnosis Date    Allergic rhinitis     COVID 12/27/2021    Diabetes mellitus (HCC)     diet control    GERD (gastroesophageal reflux disease)     GERD (gastroesophageal reflux disease)     Hemorrhoids     Hyperlipidemia     Hypoglycemia     Kidney stones     Prolonged emergence from general anesthesia     Tobacco abuse     UTI (urinary tract infection)     frequency        Allergies   Allergen Reactions    Latex Dermatitis    Amoxil [Amoxicillin] Diarrhea    Bactrim [Sulfamethoxazole-Trimethoprim] Itching    Cantaloupe (Diagnostic) Swelling     Tongue swells    Clindamycin/Lincomycin Nausea And Vomiting    Flagyl [Metronidazole] Other (See Comments)     Sees bugs crawling    Flomax [Tamsulosin Hcl] Nausea Only and Other (See Comments)     Lightheaded, couldn't walk by herself    Biaxin

## 2024-07-01 NOTE — TELEPHONE ENCOUNTER
Emily called requesting a refill of the below medication which has been pended for you:     Requested Prescriptions     Pending Prescriptions Disp Refills    esomeprazole (NEXIUM) 40 MG delayed release capsule 90 capsule 3     Sig: Take 1 capsule by mouth daily       Last Appointment Date: 2/19/2024  Next Appointment Date: 8/21/2024    Allergies   Allergen Reactions    Latex Dermatitis    Amoxil [Amoxicillin] Diarrhea    Bactrim [Sulfamethoxazole-Trimethoprim] Itching    Cantaloupe (Diagnostic) Swelling     Tongue swells    Clindamycin/Lincomycin Nausea And Vomiting    Flagyl [Metronidazole] Other (See Comments)     Sees bugs crawling    Flomax [Tamsulosin Hcl] Nausea Only and Other (See Comments)     Lightheaded, couldn't walk by herself    Biaxin [Clarithromycin] Nausea And Vomiting    Ceftin [Cefuroxime Axetil] Diarrhea and Nausea And Vomiting    Keflex [Cephalexin] Nausea And Vomiting

## 2024-07-01 NOTE — TELEPHONE ENCOUNTER
Patient called, is still having pain, and not feeling well after starting the antibiotic and flomax. Please advise

## 2024-07-01 NOTE — PATIENT INSTRUCTIONS
Cipro- this was extended out from last course of treatment  Zofran for nausea  Toradol provided in clinic- do not take any more NSAID's today  Clear liquid diet and increase as tolerated. I recommended the BRAT diet and increase as tolerated. Take Zofran as directed. We discussed the symptoms of dehydration. Instructed to follow up with PCP if symptoms have not improved or worsen.

## 2024-07-02 ENCOUNTER — TELEPHONE (OUTPATIENT)
Dept: UROLOGY | Age: 44
End: 2024-07-02

## 2024-07-02 NOTE — TELEPHONE ENCOUNTER
Patient was seen in urgent care on 7/01/2024 for flank pain. Patient called stating she was told to let 's office know she was seen so he could review her notes. Please advise. Can contact patient with any questions at Ph# [phone].

## 2024-07-09 ENCOUNTER — TELEPHONE (OUTPATIENT)
Dept: UROLOGY | Age: 44
End: 2024-07-09

## 2024-07-09 ENCOUNTER — OFFICE VISIT (OUTPATIENT)
Dept: UROLOGY | Age: 44
End: 2024-07-09
Payer: COMMERCIAL

## 2024-07-09 ENCOUNTER — HOSPITAL ENCOUNTER (OUTPATIENT)
Dept: INTERVENTIONAL RADIOLOGY/VASCULAR | Age: 44
Discharge: HOME | End: 2024-07-11
Attending: INTERNAL MEDICINE
Payer: COMMERCIAL

## 2024-07-09 VITALS — HEART RATE: 88 BPM | SYSTOLIC BLOOD PRESSURE: 126 MMHG | DIASTOLIC BLOOD PRESSURE: 80 MMHG

## 2024-07-09 DIAGNOSIS — R10.9 LEFT FLANK PAIN: Primary | ICD-10-CM

## 2024-07-09 DIAGNOSIS — N20.0 LEFT NEPHROLITHIASIS: ICD-10-CM

## 2024-07-09 DIAGNOSIS — N20.0 NEPHROLITHIASIS: ICD-10-CM

## 2024-07-09 PROCEDURE — 76770 US EXAM ABDO BACK WALL COMP: CPT

## 2024-07-09 PROCEDURE — 99213 OFFICE O/P EST LOW 20 MIN: CPT

## 2024-07-09 RX ORDER — TRAMADOL HYDROCHLORIDE 50 MG/1
50 TABLET ORAL EVERY 8 HOURS PRN
Qty: 12 TABLET | Refills: 0 | Status: SHIPPED | OUTPATIENT
Start: 2024-07-09 | End: 2024-07-13

## 2024-07-09 NOTE — TELEPHONE ENCOUNTER
Normal ROSALVA. No hydronephrosis or perinephric/subcortical hematoma identified.     Please offer CT Abdomen Pelvis WO Contrast to assess for stone fragments in the ureter (to explain left flank pain).

## 2024-07-09 NOTE — TELEPHONE ENCOUNTER
Patient called wanting to let Andrea know that her renal ultrasound is complete if he would like to take a look at it.

## 2024-07-09 NOTE — PROGRESS NOTES
negative    Objective:     PE:   Vitals:    07/09/24 1016   BP: (!) 136/94   Pulse: 88       Constitutional:       No acute distress, cooperative and answering questions appropriately.  Cardiovascular:        Normal rate and regular rhythm.  Pulmonary/Chest:       Normal respiratory rate and rhthym.  No use of accessory muscles.   Abdominal:        Soft. No tenderness. Bowel sounds present.  Neurological:        Alert and oriented to person, place, time, and situation.  Psychiatric:        Normal mood and affect.  Genitourinary:       Bladder non-tender and non-distended.          Recent BUN/Creatinine:  Lab Results   Component Value Date/Time    BUN 16 07/01/2024 05:45 PM    CREATININE 0.7 07/01/2024 05:45 PM            Assessment & Plan:   Left Flank Pain  Left Nephrolithiasis  - S/P Left ESWL. Discussed possibility of migration of stone fragments vs subcapsular or perinephric hematoma as a result of ESWL.   - Patient does have ROSALVA scheduled for today for Nephrology. Will follow this to assess for hydronephrosis that may indicate a ureteral calculus as well as to rule out hematoma. Will call patient to discuss results.   - Tramadol for evening pain control. Counseled to stop NSAIDs due to renal history. Tylenol during the daytime for pain control.    *Symptom check in 4-6 weeks.    Andrea Levine PA-C  Urology

## 2024-07-09 NOTE — TELEPHONE ENCOUNTER
Spoke with patient, notified of results. Is agreeable to get ct, patient transferred to radiology scheduling to do this.

## 2024-07-11 ENCOUNTER — HOSPITAL ENCOUNTER (OUTPATIENT)
Dept: CT IMAGING | Age: 44
Discharge: HOME | End: 2024-07-13
Payer: COMMERCIAL

## 2024-07-11 DIAGNOSIS — R10.9 LEFT FLANK PAIN: ICD-10-CM

## 2024-07-11 PROCEDURE — 74176 CT ABD & PELVIS W/O CONTRAST: CPT

## 2024-07-15 ENCOUNTER — TELEPHONE (OUTPATIENT)
Dept: UROLOGY | Age: 44
End: 2024-07-15

## 2024-07-15 NOTE — TELEPHONE ENCOUNTER
Patient called asking for results of CT Scan done on 7/11/2024. Can contact patient at # [phone].

## 2024-07-16 NOTE — TELEPHONE ENCOUNTER
Spoke with patient, results given. Patient still having \"issues 3 weeks post surgery\". Patient scheduled for follow up

## 2024-07-18 ENCOUNTER — OFFICE VISIT (OUTPATIENT)
Dept: UROLOGY | Age: 44
End: 2024-07-18
Payer: COMMERCIAL

## 2024-07-18 VITALS
SYSTOLIC BLOOD PRESSURE: 134 MMHG | HEART RATE: 87 BPM | BODY MASS INDEX: 25.34 KG/M2 | HEIGHT: 63 IN | DIASTOLIC BLOOD PRESSURE: 82 MMHG | WEIGHT: 143 LBS

## 2024-07-18 DIAGNOSIS — R53.81 MALAISE: ICD-10-CM

## 2024-07-18 DIAGNOSIS — R10.9 LEFT FLANK PAIN: Primary | ICD-10-CM

## 2024-07-18 DIAGNOSIS — N20.0 BILATERAL NEPHROLITHIASIS: ICD-10-CM

## 2024-07-18 PROCEDURE — 99213 OFFICE O/P EST LOW 20 MIN: CPT

## 2024-07-18 RX ORDER — LEVOFLOXACIN 500 MG/1
500 TABLET, FILM COATED ORAL DAILY
Qty: 10 TABLET | Refills: 0 | Status: SHIPPED | OUTPATIENT
Start: 2024-07-18 | End: 2024-07-18

## 2024-07-18 RX ORDER — FLUCONAZOLE 150 MG/1
150 TABLET ORAL ONCE
Qty: 1 TABLET | Refills: 0 | Status: SHIPPED | OUTPATIENT
Start: 2024-07-18 | End: 2024-07-18

## 2024-07-18 RX ORDER — LEVOFLOXACIN 500 MG/1
500 TABLET, FILM COATED ORAL DAILY
Qty: 10 TABLET | Refills: 0 | Status: SHIPPED | OUTPATIENT
Start: 2024-07-18 | End: 2024-07-28

## 2024-07-18 NOTE — PROGRESS NOTES
Hyperlipidemia, Hypoglycemia, Kidney stones, Prolonged emergence from general anesthesia, Tobacco abuse, and UTI (urinary tract infection).    Past Surgical History  The patient  has a past surgical history that includes Lithotripsy; Tubal ligation; Endometrial ablation; Carpal tunnel release (Left); Refractive surgery (Bilateral); other surgical history (Right, 09/07/2016); pr egd transoral biopsy single/multiple (N/A, 03/13/2017); Upper gastrointestinal endoscopy (2010); Upper gastrointestinal endoscopy (03/13/2017); Nasal septum surgery; Upper gastrointestinal endoscopy (N/A, 03/28/2019); Colonoscopy (N/A, 03/01/2022); Hysterectomy (N/A, 03/11/2022); Cystoscopy (Bilateral, 9/11/2023); and Lithotripsy (N/A, 6/24/2024).    Family History  This patient's family history includes Cancer in her father; Diabetes in her father; High Blood Pressure in her father and mother; Stroke in her mother; Thyroid Disease in her mother.    Social History  Emily  reports that she quit smoking about 3 years ago. Her smoking use included cigarettes. She started smoking about 12 years ago. She has a 5.0 pack-year smoking history. She has never used smokeless tobacco. She reports that she does not currently use alcohol. She reports that she does not use drugs.    Subjective:   REVIEW OF SYSTEMS:  Constitutional: negative  Eyes: negative  Respiratory: negative  Cardiovascular: negative  Gastrointestinal: negative  Musculoskeletal: negative  Genitourinary: negative except for what is in HPI  Skin: negative   Neurological: negative  Hematological/Lymphatic: negative  Psychological: negative    Objective:     PE:   Vitals:    07/18/24 1507   BP: 134/82   Pulse: 87   Weight: 64.9 kg (143 lb)   Height: 1.6 m (5' 3\")       Constitutional:       No acute distress, cooperative and answering questions appropriately.  Cardiovascular:        Normal rate and regular rhythm.  Pulmonary/Chest:       Normal respiratory rate and rhthym.  No use of

## 2024-07-26 ENCOUNTER — PATIENT MESSAGE (OUTPATIENT)
Dept: NEPHROLOGY | Age: 44
End: 2024-07-26

## 2024-07-26 DIAGNOSIS — N20.0 NEPHROLITHIASIS: Primary | ICD-10-CM

## 2024-07-29 NOTE — TELEPHONE ENCOUNTER
From: Emily León  To: Dr. Robin Brooks  Sent: 7/26/2024 4:55 PM EDT  Subject: 25 hour urine test    I was just wondering if you got anything back from the 24 hour urine test yet. I've been on an antibiotic for about 8 days, I'm just starting to feel better. I was hoping to hear something before my September appointment. Thanks  Emily León

## 2024-07-31 RX ORDER — CHLORTHALIDONE 25 MG/1
12.5 TABLET ORAL DAILY
Qty: 90 TABLET | Refills: 1 | Status: SHIPPED | OUTPATIENT
Start: 2024-07-31

## 2024-08-07 DIAGNOSIS — E11.9 TYPE 2 DIABETES MELLITUS WITHOUT COMPLICATION, WITHOUT LONG-TERM CURRENT USE OF INSULIN (HCC): ICD-10-CM

## 2024-08-09 RX ORDER — DULAGLUTIDE 0.75 MG/.5ML
0.75 INJECTION, SOLUTION SUBCUTANEOUS WEEKLY
Qty: 2 ML | Refills: 0 | Status: SHIPPED | OUTPATIENT
Start: 2024-08-09

## 2024-08-12 ENCOUNTER — OFFICE VISIT (OUTPATIENT)
Dept: NEPHROLOGY | Age: 44
End: 2024-08-12
Payer: COMMERCIAL

## 2024-08-12 VITALS
HEIGHT: 63 IN | HEART RATE: 88 BPM | BODY MASS INDEX: 25.52 KG/M2 | DIASTOLIC BLOOD PRESSURE: 78 MMHG | SYSTOLIC BLOOD PRESSURE: 124 MMHG | WEIGHT: 144 LBS

## 2024-08-12 DIAGNOSIS — E11.9 TYPE 2 DIABETES MELLITUS WITHOUT COMPLICATION, WITHOUT LONG-TERM CURRENT USE OF INSULIN (HCC): ICD-10-CM

## 2024-08-12 DIAGNOSIS — I10 HYPERTENSION, ESSENTIAL: ICD-10-CM

## 2024-08-12 DIAGNOSIS — N20.0 NEPHROLITHIASIS: Primary | ICD-10-CM

## 2024-08-12 PROCEDURE — 3074F SYST BP LT 130 MM HG: CPT | Performed by: INTERNAL MEDICINE

## 2024-08-12 PROCEDURE — 3078F DIAST BP <80 MM HG: CPT | Performed by: INTERNAL MEDICINE

## 2024-08-12 PROCEDURE — 99213 OFFICE O/P EST LOW 20 MIN: CPT | Performed by: INTERNAL MEDICINE

## 2024-08-12 PROCEDURE — 3051F HG A1C>EQUAL 7.0%<8.0%: CPT | Performed by: INTERNAL MEDICINE

## 2024-08-12 NOTE — PROGRESS NOTES
(DITROPAN XL) 10 MG extended release tablet Take 1 tablet by mouth daily 30 tablet 3    ibuprofen (ADVIL;MOTRIN) 800 MG tablet Take 1 tablet by mouth every 8 hours as needed for Pain 30 tablet 0    dicyclomine (BENTYL) 10 MG capsule Take 1 capsule by mouth 4 times daily as needed (for lower GI symptoms) 120 capsule 3    nystatin (MYCOSTATIN) 432388 UNIT/GM cream Apply topically to external ear up to 2 times daily as needed. 30 g 1    fluticasone (FLONASE) 50 MCG/ACT nasal spray 1-2 sprays by Nasal route daily 48 g 3    famotidine (PEPCID) 20 MG tablet Take 1 tablet by mouth nightly 90 tablet 3    loratadine (CLARITIN) 10 MG tablet Take 1 tablet by mouth nightly      NONFORMULARY Take 1 capsule by mouth THRIVE capsule, nutritional shake and patch       No current facility-administered medications for this visit.       REVIEW OF SYSTEMS     Constitutional: No fever, no chills, no lethargy, no weakness.  HEENT:  No headache, otalgia, itchy eyes, nasal discharge or sore throat.  Cardiac:  No chest pain, dyspnea, orthopnea or PND.  Chest:   No cough, phlegm or wheezing or hemoptysis .  Abdomen:  No abdominal pain, nausea or vomiting.  Neuro:  No focal weakness, abnormal movements or seizure like activity.  Skin:   No rashes, no itching.  :   No hematuria, no pyuria, no dysuria, no flank pain.  Extremities:  No swelling or joint pains.  ROS was otherwise negative except as mentioned in the [place].     OBJECTIVE      Vitals:    08/12/24 1319   BP: 124/78   Pulse: 88     Wt Readings from Last 2 Encounters:   08/12/24 65.3 kg (144 lb)   07/18/24 64.9 kg (143 lb)     Body mass index is 25.51 kg/m².     PHYSICAL EXAM      GENERAL APPEARANCE: awake, alert, in no acute distress  SKIN: warm and dry, no rash or erythema  EYES: conjunctivae normal and sclera anicteric  ENT: no thrush no pharyngeal congestion   NECK: supple    PULMONARY: clear to auscultation and no wheezing noted   CADRDIOVASCULAR: normal S1 & S2,  no murmur

## 2024-08-16 ENCOUNTER — HOSPITAL ENCOUNTER (OUTPATIENT)
Age: 44
Discharge: HOME | End: 2024-08-16
Payer: COMMERCIAL

## 2024-08-16 DIAGNOSIS — E78.5 HYPERLIPIDEMIA, UNSPECIFIED HYPERLIPIDEMIA TYPE: ICD-10-CM

## 2024-08-16 DIAGNOSIS — E11.9 TYPE 2 DIABETES MELLITUS WITHOUT COMPLICATION, WITHOUT LONG-TERM CURRENT USE OF INSULIN (HCC): ICD-10-CM

## 2024-08-16 LAB
CHOLEST SERPL-MCNC: 217 MG/DL (ref 0–199)
CHOLESTEROL/HDL RATIO: 5
CREAT UR-MCNC: 324 MG/DL (ref 28–217)
EST. AVERAGE GLUCOSE BLD GHB EST-MCNC: 183 MG/DL
HBA1C MFR BLD: 8 % (ref 4–6)
HDLC SERPL-MCNC: 45 MG/DL
LDLC SERPL CALC-MCNC: 152 MG/DL (ref 0–100)
MICROALBUMIN UR-MCNC: 48 MG/L (ref 0–20)
MICROALBUMIN/CREAT UR-RTO: 15 MCG/MG CREAT (ref 0–25)
TRIGL SERPL-MCNC: 98 MG/DL
VLDLC SERPL CALC-MCNC: 20 MG/DL

## 2024-08-16 PROCEDURE — 83036 HEMOGLOBIN GLYCOSYLATED A1C: CPT

## 2024-08-16 PROCEDURE — 82570 ASSAY OF URINE CREATININE: CPT

## 2024-08-16 PROCEDURE — 36415 COLL VENOUS BLD VENIPUNCTURE: CPT

## 2024-08-16 PROCEDURE — 82043 UR ALBUMIN QUANTITATIVE: CPT

## 2024-08-16 PROCEDURE — 80061 LIPID PANEL: CPT

## 2024-08-21 ENCOUNTER — OFFICE VISIT (OUTPATIENT)
Dept: FAMILY MEDICINE CLINIC | Age: 44
End: 2024-08-21
Payer: COMMERCIAL

## 2024-08-21 ENCOUNTER — HOSPITAL ENCOUNTER (OUTPATIENT)
Age: 44
Setting detail: SPECIMEN
Discharge: HOME | End: 2024-08-21
Payer: COMMERCIAL

## 2024-08-21 VITALS
OXYGEN SATURATION: 98 % | HEIGHT: 63 IN | BODY MASS INDEX: 25.16 KG/M2 | SYSTOLIC BLOOD PRESSURE: 112 MMHG | RESPIRATION RATE: 16 BRPM | DIASTOLIC BLOOD PRESSURE: 68 MMHG | WEIGHT: 142 LBS | HEART RATE: 94 BPM | TEMPERATURE: 98 F

## 2024-08-21 DIAGNOSIS — R82.90 ABNORMAL URINE SEDIMENT: ICD-10-CM

## 2024-08-21 DIAGNOSIS — L23.7 POISON IVY DERMATITIS: ICD-10-CM

## 2024-08-21 DIAGNOSIS — B37.31 RECURRENT CANDIDIASIS OF VAGINA: ICD-10-CM

## 2024-08-21 DIAGNOSIS — Z12.31 SCREENING MAMMOGRAM FOR BREAST CANCER: ICD-10-CM

## 2024-08-21 DIAGNOSIS — J30.1 CHRONIC SEASONAL ALLERGIC RHINITIS DUE TO POLLEN: ICD-10-CM

## 2024-08-21 DIAGNOSIS — K21.9 LPRD (LARYNGOPHARYNGEAL REFLUX DISEASE): ICD-10-CM

## 2024-08-21 DIAGNOSIS — E11.9 TYPE 2 DIABETES MELLITUS WITHOUT COMPLICATION, WITHOUT LONG-TERM CURRENT USE OF INSULIN (HCC): Primary | ICD-10-CM

## 2024-08-21 DIAGNOSIS — E78.5 HYPERLIPIDEMIA, UNSPECIFIED HYPERLIPIDEMIA TYPE: ICD-10-CM

## 2024-08-21 LAB
BILIRUB UR QL STRIP: NEGATIVE
CLARITY UR: CLEAR
COLOR UR: YELLOW
COMMENT: ABNORMAL
GLUCOSE UR STRIP-MCNC: ABNORMAL MG/DL
HGB UR QL STRIP.AUTO: NEGATIVE
KETONES UR STRIP-MCNC: ABNORMAL MG/DL
LEUKOCYTE ESTERASE UR QL STRIP: NEGATIVE
NITRITE UR QL STRIP: NEGATIVE
PH UR STRIP: 6 [PH] (ref 5–6)
PROT UR STRIP-MCNC: NEGATIVE MG/DL
SP GR UR STRIP: 1.03 (ref 1.01–1.02)
UROBILINOGEN UR STRIP-ACNC: NORMAL EU/DL (ref 0–1)

## 2024-08-21 PROCEDURE — 81003 URINALYSIS AUTO W/O SCOPE: CPT

## 2024-08-21 PROCEDURE — 99214 OFFICE O/P EST MOD 30 MIN: CPT | Performed by: FAMILY MEDICINE

## 2024-08-21 PROCEDURE — 3052F HG A1C>EQUAL 8.0%<EQUAL 9.0%: CPT | Performed by: FAMILY MEDICINE

## 2024-08-21 RX ORDER — TRIAMCINOLONE ACETONIDE 1 MG/G
OINTMENT TOPICAL 2 TIMES DAILY
Qty: 30 G | Refills: 1 | Status: SHIPPED | OUTPATIENT
Start: 2024-08-21 | End: 2024-08-28

## 2024-08-21 RX ORDER — FAMOTIDINE 20 MG/1
20 TABLET, FILM COATED ORAL NIGHTLY
Qty: 90 TABLET | Refills: 3 | Status: SHIPPED | OUTPATIENT
Start: 2024-08-21

## 2024-08-21 RX ORDER — FLUTICASONE PROPIONATE 50 MCG
1-2 SPRAY, SUSPENSION (ML) NASAL DAILY
Qty: 48 G | Refills: 3 | Status: SHIPPED | OUTPATIENT
Start: 2024-08-21

## 2024-08-21 RX ORDER — FLUCONAZOLE 150 MG/1
TABLET ORAL
COMMUNITY
Start: 2024-07-18 | End: 2024-08-21 | Stop reason: SDUPTHER

## 2024-08-21 RX ORDER — DULAGLUTIDE 0.75 MG/.5ML
0.75 INJECTION, SOLUTION SUBCUTANEOUS WEEKLY
Qty: 2 ML | Refills: 0 | Status: CANCELLED | OUTPATIENT
Start: 2024-08-21

## 2024-08-21 RX ORDER — DULAGLUTIDE 1.5 MG/.5ML
1.5 INJECTION, SOLUTION SUBCUTANEOUS WEEKLY
Qty: 6 ML | Refills: 0 | Status: SHIPPED | OUTPATIENT
Start: 2024-08-21

## 2024-08-21 RX ORDER — LORATADINE 10 MG/1
10 TABLET ORAL NIGHTLY
Qty: 90 TABLET | Refills: 3 | Status: SHIPPED | OUTPATIENT
Start: 2024-08-21

## 2024-08-21 RX ORDER — FLUCONAZOLE 150 MG/1
TABLET ORAL
Qty: 6 TABLET | Refills: 2 | Status: SHIPPED | OUTPATIENT
Start: 2024-08-21

## 2024-08-21 SDOH — ECONOMIC STABILITY: INCOME INSECURITY: HOW HARD IS IT FOR YOU TO PAY FOR THE VERY BASICS LIKE FOOD, HOUSING, MEDICAL CARE, AND HEATING?: NOT HARD AT ALL

## 2024-08-21 SDOH — ECONOMIC STABILITY: FOOD INSECURITY: WITHIN THE PAST 12 MONTHS, THE FOOD YOU BOUGHT JUST DIDN'T LAST AND YOU DIDN'T HAVE MONEY TO GET MORE.: NEVER TRUE

## 2024-08-21 SDOH — ECONOMIC STABILITY: FOOD INSECURITY: WITHIN THE PAST 12 MONTHS, YOU WORRIED THAT YOUR FOOD WOULD RUN OUT BEFORE YOU GOT MONEY TO BUY MORE.: NEVER TRUE

## 2024-08-21 NOTE — PROGRESS NOTES
General: No focal deficit present.      Mental Status: She is alert and oriented to person, place, and time.   Psychiatric:         Mood and Affect: Mood normal.       Diabetic foot check: Normal sensation with the monofilament bilaterally. Dorsalis pedis pulses intact bilaterally. No skin breakdown, erythema, blisters, scaling, or ulcers. Toenails thin and not ingrown. No evidence of fungal infection.      Assessment:      1. Type 2 diabetes mellitus without complication, without long-term current use of insulin ([facility])  -     Microalbumin, Ur; Future  -     Lipid Panel; Future  -     Hemoglobin A1C; Future  -      DIABETES FOOT EXAM  -     dulaglutide (TRULICITY) 1.5 MG/0.5ML SC injection; Inject 0.5 mLs into the skin once a week, Disp-6 mL, R-0Normal  2. Abnormal urine sediment  -     Urinalysis with Reflex to Culture; Future  3. Hyperlipidemia, unspecified hyperlipidemia type  -     Lipid Panel; Future  4. Recurrent candidiasis of vagina  -     fluconazole (DIFLUCAN) 150 MG tablet; Take 1 tab by mouth once; repeat dose again 3 days., Disp-6 tablet, R-2Normal  5. Poison ivy dermatitis  -     triamcinolone (KENALOG) 0.1 % ointment; Apply topically 2 times daily for 7 days Apply topically to affected area twice daily as needed., Topical, 2 TIMES DAILY Starting Wed 8/21/2024, Until Wed 8/28/2024, For 7 days, Disp-30 g, R-1, Normal  6. LPRD (laryngopharyngeal reflux disease)  -     famotidine (PEPCID) 20 MG tablet; Take 1 tablet by mouth nightly, Disp-90 tablet, R-3Normal  7. Chronic seasonal allergic rhinitis due to pollen  -     fluticasone (FLONASE) 50 MCG/ACT nasal spray; 1-2 sprays by Nasal route daily, Disp-48 g, R-3Normal  -     loratadine (CLARITIN) 10 MG tablet; Take 1 tablet by mouth nightly, Disp-90 tablet, R-3Normal  8. Screening mammogram for breast cancer  -     [facility] ROM DIGITAL SCREEN BILATERAL; Future         Plan:     Will increase Trulicity to 1.5 mg once weekly and re-check A1c again in 3 months.   Will re-check microalbumin in 1 year.    Will re-check LP again in 6 months.     Declines Tdap booster.      Return in about 6 months (around 2025) for f/u DM, HLD, labs.    Orders Placed This Encounter   Procedures    [facility] ROM DIGITAL SCREEN BILATERAL     Standing Status:   Future     Standing Expiration Date:   10/21/2025     Order Specific Question:   Reason for exam:     Answer:   screening for breast cancer    Microalbumin, Ur     Standing Status:   Future     Standing Expiration Date:   2026    Lipid Panel     Standing Status:   Future     Standing Expiration Date:   2026    Hemoglobin A1C     Standing Status:   Future     Standing Expiration Date:   2025    Urinalysis with Reflex to Culture     Standing Status:   Future     Number of Occurrences:   1     Standing Expiration Date:   2025     Order Specific Question:   SPECIFY(EX-CATH,MIDSTREAM,CYSTO,ETC)?     Answer:   clean catch    [facility] DIABETES FOOT EXAM     Orders Placed This Encounter   Medications    famotidine (PEPCID) 20 MG tablet     Sig: Take 1 tablet by mouth nightly     Dispense:  90 tablet     Refill:  3    fluconazole (DIFLUCAN) 150 MG tablet     Sig: Take 1 tab by mouth once; repeat dose again 3 days.     Dispense:  6 tablet     Refill:  2    fluticasone (FLONASE) 50 MCG/ACT nasal spray     Si-2 sprays by Nasal route daily     Dispense:  48 g     Refill:  3    loratadine (CLARITIN) 10 MG tablet     Sig: Take 1 tablet by mouth nightly     Dispense:  90 tablet     Refill:  3    dulaglutide (TRULICITY) 1.5 MG/0.5ML SC injection     Sig: Inject 0.5 mLs into the skin once a week     Dispense:  6 mL     Refill:  0    triamcinolone (KENALOG) 0.1 % ointment     Sig: Apply topically 2 times daily for 7 days Apply topically to affected area twice daily as needed.     Dispense:  30 g     Refill:  1           Patient given educational materials - see patient instructions.  Discussed use of prescribed medications.  All patient

## 2024-08-23 ENCOUNTER — TELEPHONE (OUTPATIENT)
Dept: FAMILY MEDICINE CLINIC | Age: 44
End: 2024-08-23

## 2024-08-23 NOTE — TELEPHONE ENCOUNTER
Patient called and seen Dr. Eason on 8/21/24 for UTI symptoms. Patient states she had left over Levaquin 500 mg tablets at home and began taking those on 8/19/24 and She ran out on 8/22.   Patient states she was going to be contacted with UA results and has not been advised. She is still having urinary symptoms such as dysuria and kidney pain.     Dr. Eason is not in the office. Please advise patient

## 2024-08-23 NOTE — TELEPHONE ENCOUNTER
Urine doesn't look like infection .  Has sugar in her urine, so not sure if her sugars are running high, but this may lead to some of her symptoms.

## 2024-09-09 ENCOUNTER — HOSPITAL ENCOUNTER (OUTPATIENT)
Age: 44
Setting detail: SPECIMEN
Discharge: HOME | End: 2024-09-09
Payer: COMMERCIAL

## 2024-09-09 PROCEDURE — 87086 URINE CULTURE/COLONY COUNT: CPT

## 2024-09-10 DIAGNOSIS — R30.0 DYSURIA: ICD-10-CM

## 2024-09-11 LAB
MICROORGANISM SPEC CULT: NO GROWTH
SERVICE CMNT-IMP: NORMAL
SPECIMEN DESCRIPTION: NORMAL

## 2024-09-19 ENCOUNTER — OFFICE VISIT (OUTPATIENT)
Dept: UROLOGY | Age: 44
End: 2024-09-19
Payer: COMMERCIAL

## 2024-09-19 VITALS
DIASTOLIC BLOOD PRESSURE: 80 MMHG | HEIGHT: 63 IN | BODY MASS INDEX: 25.15 KG/M2 | HEART RATE: 92 BPM | SYSTOLIC BLOOD PRESSURE: 124 MMHG

## 2024-09-19 DIAGNOSIS — R10.9 RIGHT FLANK PAIN: ICD-10-CM

## 2024-09-19 DIAGNOSIS — N20.0 BILATERAL NEPHROLITHIASIS: Primary | ICD-10-CM

## 2024-09-19 LAB
BILIRUBIN, POC: NORMAL
BLOOD URINE, POC: NORMAL
CLARITY, POC: NORMAL
COLOR, POC: NORMAL
GLUCOSE URINE, POC: 500 MG/DL
KETONES, POC: NORMAL MG/DL
LEUKOCYTE EST, POC: NORMAL
NITRITE, POC: NORMAL
PH, POC: 6
PROTEIN, POC: NORMAL MG/DL
SPECIFIC GRAVITY, POC: 1.03
UROBILINOGEN, POC: 0.2 MG/DL

## 2024-09-19 PROCEDURE — 81003 URINALYSIS AUTO W/O SCOPE: CPT

## 2024-09-19 PROCEDURE — 99213 OFFICE O/P EST LOW 20 MIN: CPT

## 2024-09-26 ENCOUNTER — HOSPITAL ENCOUNTER (OUTPATIENT)
Dept: CT IMAGING | Age: 44
Discharge: HOME | End: 2024-09-28
Payer: COMMERCIAL

## 2024-09-26 DIAGNOSIS — N20.0 BILATERAL NEPHROLITHIASIS: ICD-10-CM

## 2024-09-26 DIAGNOSIS — R10.9 RIGHT FLANK PAIN: ICD-10-CM

## 2024-09-26 PROCEDURE — 74176 CT ABD & PELVIS W/O CONTRAST: CPT

## 2024-10-08 ENCOUNTER — HOSPITAL ENCOUNTER (OUTPATIENT)
Dept: MAMMOGRAPHY | Age: 44
Discharge: HOME | End: 2024-10-10
Attending: FAMILY MEDICINE
Payer: COMMERCIAL

## 2024-10-08 VITALS — HEIGHT: 63 IN | WEIGHT: 131 LBS | BODY MASS INDEX: 23.21 KG/M2

## 2024-10-08 DIAGNOSIS — Z12.31 SCREENING MAMMOGRAM FOR BREAST CANCER: ICD-10-CM

## 2024-10-08 PROCEDURE — 77067 SCR MAMMO BI INCL CAD: CPT

## 2024-10-17 ENCOUNTER — OFFICE VISIT (OUTPATIENT)
Dept: UROLOGY | Age: 44
End: 2024-10-17
Payer: COMMERCIAL

## 2024-10-17 VITALS
DIASTOLIC BLOOD PRESSURE: 84 MMHG | SYSTOLIC BLOOD PRESSURE: 130 MMHG | HEART RATE: 80 BPM | HEIGHT: 63 IN | BODY MASS INDEX: 23.21 KG/M2

## 2024-10-17 DIAGNOSIS — N20.0 BILATERAL NEPHROLITHIASIS: Primary | ICD-10-CM

## 2024-10-17 DIAGNOSIS — R10.9 RIGHT FLANK PAIN: ICD-10-CM

## 2024-10-17 PROCEDURE — 99213 OFFICE O/P EST LOW 20 MIN: CPT

## 2024-10-17 NOTE — PROGRESS NOTES
[facility], [facility] OF [facility]  [address] [facility] [address]  Dept: [phone]  Visit Date: 10/17/2024      HPI:   Emily León is a 44 y.o. female with past medical history as listed below who presents today for kidney stone follow-up.    S/P Left ESWL by Dr. More on 06/24/24 for her ~7 mm left lower pole calculus (in addition to a couple of 2-3 mm calculi also present in the left kidney). Post-operatively patient complained of persistent left-sided flank pain..CT in July was negative for ureterolithiasis or any other acute intra-abdominal process. Left-sided flank pain resolved with Levaquin course.    Right flank pain is better when compared to last visit but intermittently more severe. CT's and urine cultures over the past 2 months have been negative.    Diabetic II.      Current Outpatient Medications   Medication Sig Dispense Refill    famotidine (PEPCID) 20 MG tablet Take 1 tablet by mouth nightly 90 tablet 3    fluconazole (DIFLUCAN) 150 MG tablet Take 1 tab by mouth once; repeat dose again 3 days. 6 tablet 2    fluticasone (FLONASE) 50 MCG/ACT nasal spray 1-2 sprays by Nasal route daily 48 g 3    loratadine (CLARITIN) 10 MG tablet Take 1 tablet by mouth nightly 90 tablet 3    dulaglutide (TRULICITY) 1.5 MG/0.5ML SC injection Inject 0.5 mLs into the skin once a week 6 mL 0    hydroCHLOROthiazide 12.5 MG capsule Take 1 capsule by mouth 2 times daily 90 capsule 1    chlorthalidone (HYGROTON) 25 MG tablet Take 0.5 tablets by mouth daily 90 tablet 1    esomeprazole (NEXIUM) 40 MG delayed release capsule Take 1 capsule by mouth daily 90 capsule 3    oxyBUTYnin (DITROPAN XL) 10 MG extended release tablet Take 1 tablet by mouth daily 30 tablet 3    dicyclomine (BENTYL) 10 MG capsule Take 1 capsule by mouth 4 times daily as needed (for lower GI symptoms) 120 capsule 3    NONFORMULARY Take 1 capsule by

## 2024-11-11 DIAGNOSIS — E11.9 TYPE 2 DIABETES MELLITUS WITHOUT COMPLICATION, WITHOUT LONG-TERM CURRENT USE OF INSULIN (HCC): ICD-10-CM

## 2024-11-11 NOTE — TELEPHONE ENCOUNTER
Emily called requesting a refill of the below medication which has been pended for you:     Requested Prescriptions     Pending Prescriptions Disp Refills    TRULICITY 1.5 MG/0.5ML SC injection [Pharmacy Med Name: TRULICITY 1.5 MG/0.5 ML PEN] 2 mL      Sig: INJECT 0.5 MLS INTO THE SKIN ONCE A WEEK.       Last Appointment Date: 8/21/2024  Next Appointment Date: 2/26/2025    Allergies   Allergen Reactions    Latex Dermatitis    Amoxil [Amoxicillin] Diarrhea    Bactrim [Sulfamethoxazole-Trimethoprim] Itching    Cantaloupe (Diagnostic) Swelling     Tongue swells    Clindamycin/Lincomycin Nausea And Vomiting    Flagyl [Metronidazole] Other (See Comments)     Sees bugs crawling    Flomax [Tamsulosin Hcl] Nausea Only and Other (See Comments)     Lightheaded, couldn't walk by herself    Biaxin [Clarithromycin] Nausea And Vomiting    Ceftin [Cefuroxime Axetil] Diarrhea and Nausea And Vomiting    Keflex [Cephalexin] Nausea And Vomiting

## 2024-11-14 ENCOUNTER — OFFICE VISIT (OUTPATIENT)
Dept: PRIMARY CARE CLINIC | Age: 44
End: 2024-11-14

## 2024-11-14 VITALS
BODY MASS INDEX: 24.91 KG/M2 | TEMPERATURE: 98.5 F | OXYGEN SATURATION: 98 % | HEART RATE: 85 BPM | WEIGHT: 140.6 LBS | SYSTOLIC BLOOD PRESSURE: 122 MMHG | DIASTOLIC BLOOD PRESSURE: 84 MMHG

## 2024-11-14 DIAGNOSIS — H60.392 OTHER INFECTIVE ACUTE OTITIS EXTERNA OF LEFT EAR: Primary | ICD-10-CM

## 2024-11-14 DIAGNOSIS — R09.81 SINUS CONGESTION: ICD-10-CM

## 2024-11-14 RX ORDER — CIPROFLOXACIN AND DEXAMETHASONE 3; 1 MG/ML; MG/ML
4 SUSPENSION/ DROPS AURICULAR (OTIC) 2 TIMES DAILY
Qty: 7.5 ML | Refills: 0 | Status: SHIPPED | OUTPATIENT
Start: 2024-11-14 | End: 2024-11-21

## 2024-11-14 ASSESSMENT — ENCOUNTER SYMPTOMS
SHORTNESS OF BREATH: 0
DIARRHEA: 0
COUGH: 0
CHEST TIGHTNESS: 0
SORE THROAT: 1
CHOKING: 0
NAUSEA: 0
CONSTIPATION: 0
WHEEZING: 0
SINUS PRESSURE: 1
TROUBLE SWALLOWING: 0

## 2024-11-14 NOTE — PROGRESS NOTES
(FLONASE) 50 MCG/ACT nasal spray 1-2 sprays by Nasal route daily Yes Lynda Eason DO   loratadine (CLARITIN) 10 MG tablet Take 1 tablet by mouth nightly Yes Lynda Eason DO   dulaglutide (TRULICITY) 1.5 MG/0.5ML SC injection Inject 0.5 mLs into the skin once a week Yes Lynda Eason DO   hydroCHLOROthiazide 12.5 MG capsule Take 1 capsule by mouth 2 times daily Yes Robin Brooks MD   chlorthalidone (HYGROTON) 25 MG tablet Take 0.5 tablets by mouth daily Yes Robin Brooks MD   esomeprazole (NEXIUM) 40 MG delayed release capsule Take 1 capsule by mouth daily Yes Lynda Eason DO   oxyBUTYnin (DITROPAN XL) 10 MG extended release tablet Take 1 tablet by mouth daily Yes Cornelius More MD   dicyclomine (BENTYL) 10 MG capsule Take 1 capsule by mouth 4 times daily as needed (for lower GI symptoms) Yes Lynda Eason DO   NONFORMULARY Take 1 capsule by mouth THRIVE capsule, nutritional shake and patch Yes Provider, MD Carlo     Allergies   Allergen Reactions    Latex Dermatitis    Amoxil [Amoxicillin] Diarrhea    Bactrim [Sulfamethoxazole-Trimethoprim] Itching    Cantaloupe (Diagnostic) Swelling     Tongue swells    Clindamycin/Lincomycin Nausea And Vomiting    Flagyl [Metronidazole] Other (See Comments)     Sees bugs crawling    Flomax [Tamsulosin Hcl] Nausea Only and Other (See Comments)     Lightheaded, couldn't walk by herself    Biaxin [Clarithromycin] Nausea And Vomiting    Ceftin [Cefuroxime Axetil] Diarrhea and Nausea And Vomiting    Keflex [Cephalexin] Nausea And Vomiting       Subjective:      Review of Systems   Constitutional:  Positive for chills, diaphoresis and fatigue. Negative for activity change, appetite change and fever.   HENT:  Positive for congestion, ear pain (left ear), postnasal drip, sinus pressure and sore throat. Negative for sneezing and trouble swallowing.    Eyes:  Negative for visual disturbance.   Respiratory:  Negative for cough, choking, chest tightness, shortness of breath

## 2024-11-15 RX ORDER — DULAGLUTIDE 1.5 MG/.5ML
1.5 INJECTION, SOLUTION SUBCUTANEOUS WEEKLY
Qty: 6 ML | Refills: 1 | Status: SHIPPED | OUTPATIENT
Start: 2024-11-15

## 2024-12-20 ENCOUNTER — OFFICE VISIT (OUTPATIENT)
Dept: PRIMARY CARE CLINIC | Age: 44
End: 2024-12-20

## 2024-12-20 VITALS
WEIGHT: 138 LBS | HEIGHT: 63 IN | SYSTOLIC BLOOD PRESSURE: 120 MMHG | TEMPERATURE: 99.9 F | DIASTOLIC BLOOD PRESSURE: 84 MMHG | BODY MASS INDEX: 24.45 KG/M2 | HEART RATE: 82 BPM | RESPIRATION RATE: 16 BRPM | OXYGEN SATURATION: 99 %

## 2024-12-20 DIAGNOSIS — J01.40 ACUTE NON-RECURRENT PANSINUSITIS: Primary | ICD-10-CM

## 2024-12-20 RX ORDER — PREDNISONE 20 MG/1
40 TABLET ORAL DAILY
Qty: 10 TABLET | Refills: 0 | Status: SHIPPED | OUTPATIENT
Start: 2024-12-20 | End: 2024-12-25

## 2024-12-20 RX ORDER — AZITHROMYCIN 250 MG/1
TABLET, FILM COATED ORAL
Qty: 6 TABLET | Refills: 0 | Status: SHIPPED | OUTPATIENT
Start: 2024-12-20 | End: 2024-12-30

## 2024-12-20 ASSESSMENT — ENCOUNTER SYMPTOMS
ABDOMINAL PAIN: 0
NAUSEA: 0
SHORTNESS OF BREATH: 0
WHEEZING: 0
SORE THROAT: 0
RHINORRHEA: 0
COUGH: 0
SINUS PAIN: 1
DIARRHEA: 0
VOMITING: 0

## 2024-12-20 NOTE — PATIENT INSTRUCTIONS
Steroid x 5 days  Discussed taking medication as prescribed in AM with food  Avoid NSAIDs while taking prescription steroid  Complete full course of antibiotics  Continue with mucinex and nasal sprays  May use a cary pot or saline rinses as tolerated  May use tylenol for headaches  Increase water intake  If symptoms worsen follow up with PCP  Patient verbalized understanding and agrees with plan of care

## 2024-12-20 NOTE — PROGRESS NOTES
Review of Systems   Constitutional:  Negative for chills and fever.   HENT:  Positive for congestion and sinus pain. Negative for ear pain, rhinorrhea and sore throat.    Respiratory:  Negative for cough, shortness of breath and wheezing.    Cardiovascular:  Negative for chest pain.   Gastrointestinal:  Negative for abdominal pain, diarrhea, nausea and vomiting.   Neurological:  Negative for headaches.       Objective:     Vitals:    12/20/24 1724   BP: 120/84   Site: Left Upper Arm   Position: Sitting   Cuff Size: Medium Adult   Pulse: 82   Resp: 16   Temp: 99.9 °F (37.7 °C)   TempSrc: Tympanic   SpO2: 99%   Weight: 62.6 kg (138 lb)   Height: 1.6 m (5' 3\")     Body mass index is 24.45 kg/m².    Physical Exam  Vitals and nursing note reviewed.   Constitutional:       Appearance: Normal appearance. She is not ill-appearing.   HENT:      Head: Normocephalic and atraumatic.      Right Ear: Tympanic membrane, ear canal and external ear normal.      Left Ear: Tympanic membrane, ear canal and external ear normal.      Nose: Congestion present.      Right Sinus: Maxillary sinus tenderness and frontal sinus tenderness present.      Left Sinus: Maxillary sinus tenderness and frontal sinus tenderness present.      Mouth/Throat:      Mouth: Mucous membranes are moist.      Pharynx: No oropharyngeal exudate or posterior oropharyngeal erythema.   Eyes:      Conjunctiva/sclera: Conjunctivae normal.   Cardiovascular:      Rate and Rhythm: Normal rate and regular rhythm.      Heart sounds: Normal heart sounds. No murmur heard.     No friction rub. No gallop.   Pulmonary:      Effort: Pulmonary effort is normal.      Breath sounds: Normal breath sounds. No wheezing or rhonchi.   Musculoskeletal:      Cervical back: Neck supple. No tenderness.   Lymphadenopathy:      Cervical: Cervical adenopathy present.   Skin:     General: Skin is warm.      Findings: No rash.   Neurological:      General: No focal deficit present.

## 2025-01-30 ENCOUNTER — HOSPITAL ENCOUNTER (OUTPATIENT)
Age: 45
Discharge: HOME | End: 2025-01-30
Payer: COMMERCIAL

## 2025-01-30 ENCOUNTER — TELEPHONE (OUTPATIENT)
Dept: NEPHROLOGY | Age: 45
End: 2025-01-30

## 2025-01-30 DIAGNOSIS — N20.0 NEPHROLITHIASIS: ICD-10-CM

## 2025-01-30 LAB
25(OH)D3 SERPL-MCNC: 37.5 NG/ML (ref 30–100)
ANION GAP SERPL CALCULATED.3IONS-SCNC: 11 MMOL/L (ref 9–17)
BACTERIA URNS QL MICRO: ABNORMAL
BASOPHILS # BLD: 0.04 K/UL (ref 0–0.2)
BASOPHILS NFR BLD: 1 % (ref 0–2)
BILIRUB UR QL STRIP: NEGATIVE
BUN SERPL-MCNC: 10 MG/DL (ref 6–20)
BUN/CREAT SERPL: 17 (ref 9–20)
CALCIUM SERPL-MCNC: 9.3 MG/DL (ref 8.6–10.4)
CHLORIDE SERPL-SCNC: 103 MMOL/L (ref 98–107)
CHLORIDE UR-SCNC: 164 MMOL/L
CLARITY UR: CLEAR
CO2 SERPL-SCNC: 24 MMOL/L (ref 20–31)
COLOR UR: YELLOW
CREAT SERPL-MCNC: 0.6 MG/DL (ref 0.5–0.9)
EOSINOPHIL # BLD: 0.12 K/UL (ref 0–0.44)
EOSINOPHILS RELATIVE PERCENT: 2 % (ref 1–4)
EPI CELLS #/AREA URNS HPF: ABNORMAL /HPF (ref 0–5)
ERYTHROCYTE [DISTWIDTH] IN BLOOD BY AUTOMATED COUNT: 11.9 % (ref 11.8–14.4)
GFR, ESTIMATED: >90 ML/MIN/1.73M2
GLUCOSE SERPL-MCNC: 117 MG/DL (ref 70–99)
GLUCOSE UR STRIP-MCNC: NEGATIVE MG/DL
HCT VFR BLD AUTO: 39 % (ref 36.3–47.1)
HGB BLD-MCNC: 13.5 G/DL (ref 11.9–15.1)
HGB UR QL STRIP.AUTO: NEGATIVE
IMM GRANULOCYTES # BLD AUTO: <0.03 K/UL (ref 0–0.3)
IMM GRANULOCYTES NFR BLD: 0 %
KETONES UR STRIP-MCNC: NEGATIVE MG/DL
LEUKOCYTE ESTERASE UR QL STRIP: NEGATIVE
LYMPHOCYTES NFR BLD: 2.37 K/UL (ref 1.1–3.7)
LYMPHOCYTES RELATIVE PERCENT: 32 % (ref 24–43)
MCH RBC QN AUTO: 32.1 PG (ref 25.2–33.5)
MCHC RBC AUTO-ENTMCNC: 34.6 G/DL (ref 25.2–33.5)
MCV RBC AUTO: 92.9 FL (ref 82.6–102.9)
MONOCYTES NFR BLD: 0.4 K/UL (ref 0.1–1.2)
MONOCYTES NFR BLD: 5 % (ref 3–12)
MUCOUS THREADS URNS QL MICRO: ABNORMAL
NEUTROPHILS NFR BLD: 60 % (ref 36–65)
NEUTS SEG NFR BLD: 4.5 K/UL (ref 1.5–8.1)
NITRITE UR QL STRIP: NEGATIVE
NRBC BLD-RTO: 0 PER 100 WBC
PH UR STRIP: 6 [PH] (ref 5–6)
PLATELET # BLD AUTO: 234 K/UL (ref 138–453)
PMV BLD AUTO: 10.2 FL (ref 8.1–13.5)
POTASSIUM SERPL-SCNC: 4.3 MMOL/L (ref 3.7–5.3)
PROT UR STRIP-MCNC: NEGATIVE MG/DL
RBC # BLD AUTO: 4.2 M/UL (ref 3.95–5.11)
RBC #/AREA URNS HPF: ABNORMAL /HPF (ref 0–4)
SODIUM SERPL-SCNC: 138 MMOL/L (ref 135–144)
SODIUM UR-SCNC: 111 MMOL/L
SP GR UR STRIP: 1.03 (ref 1.01–1.02)
TOTAL PROTEIN, URINE: 23 MG/DL
UROBILINOGEN UR STRIP-ACNC: NORMAL EU/DL (ref 0–1)
WBC #/AREA URNS HPF: ABNORMAL /HPF (ref 0–4)
WBC OTHER # BLD: 7.4 K/UL (ref 3.5–11.3)

## 2025-01-30 PROCEDURE — 80048 BASIC METABOLIC PNL TOTAL CA: CPT

## 2025-01-30 PROCEDURE — 84300 ASSAY OF URINE SODIUM: CPT

## 2025-01-30 PROCEDURE — 85025 COMPLETE CBC W/AUTO DIFF WBC: CPT

## 2025-01-30 PROCEDURE — 84156 ASSAY OF PROTEIN URINE: CPT

## 2025-01-30 PROCEDURE — 81001 URINALYSIS AUTO W/SCOPE: CPT

## 2025-01-30 PROCEDURE — 82306 VITAMIN D 25 HYDROXY: CPT

## 2025-01-30 PROCEDURE — 82436 ASSAY OF URINE CHLORIDE: CPT

## 2025-01-30 PROCEDURE — 36415 COLL VENOUS BLD VENIPUNCTURE: CPT

## 2025-01-30 NOTE — TELEPHONE ENCOUNTER
Pt called in after seeing UA results. Writer contacted Dr. Brooks and he stated the UA does not present as a UTI.  Pt called and does have symptoms but will wait til Monday's appt to see Dr. Brooks.    The patient was educated about the following:\\nIt is normal to have skin reactions during radiation therapy treatment. \\nPatients may develop redness at the treatment site similar to a sunburn. \\nTopical creams may be provided that will help reduce side effects and limit irritation. Detail Level: Zone We discussed several treatment regimens ranging from 4-6 weeks with the patient. The patient understands that any treatment regimen has possible side effects. The optimal cosmetic and clinical results tend to be achieved using a slower, more protracted regimen that uses lower daily radiation doses. A faster regimen can be used and may be equally effective in eradication the cancer, however does carry a higher risk of side effects. We extensively discussed EBT vs Mohs surgery. After discussion the patient would like to proceed with a protracted treatment regimen. We will schedule initial simulations today. At this time all questions appear to be answered to the patientâs satisfaction. Thank you for allowing the radiation team to participate in the care of this patient.

## 2025-02-09 NOTE — PROGRESS NOTES
Nephrology Progress Note    Lynda Santiago DO      SUBJECTIVE      Chief Complaint   Patient presents with    Nephrolithiasis     6mth follow up     2/102025:  Patient is here for for a follow-up appointment.  She is history of nephrolithiasis since 2000 and gets stones 1-2 times per year with history of stents and lithotripsy in the past. Renal stone pathology 9/7/2016: Calculi composed primarily of: 40% calcium oxalate monohydrate, 30% calcium oxalate dihydrate, and 30% calcium phosphate (hydroxy- and carbonate- apatite).  She also history of hypertension, diabetes type 2, history of frequent UTIs related to nephrolithiasis episodes. Last stone episode since sept or oct 2024.  Patient did get CT abdomen pelvis without contrast on 7/11/2024 which showed multiple nonobstructing papillary nephroliths, largest approximately 4 mm.  No hydronephrosis or obstructing ureteral calculi.  Moderate colonic stool burden.  No acute intra-abdominal or pelvic process.  Patient follows up with Dr. More from urology.   Patient doing well. No new issues. No fever, no chills, no CP, no SOB, no N/V/D, no abdomen pain, no urinary complaints, no LE edema. Complains of some \"kidney pain\" but when on exam she points to pain close to mid back- likely musculoskeletal.   She still has not started on chlorthalidone 12.5 mg daily \"states that she is scared of s/e\".  But she does report changing her diet, she is drinking more water with lemon and stopped \"thrive\" shakes and also stopped \"propel\" and stopped pop/soda as well.   Last labs 1/30/2025: BUN/Cr 10/0.6, Na 138, K 4.3, Cl 103, Bicarb 24, Ca 9.3, Phos not available, PTH not available, Vit D 37.5, Hb 13.5, UPC not available.  UA negative protein, negative nitrate and leuk esterase, WBC and RBC 0-2.  Moderate bacteria.  Negative hemoglobin.  BP today 136/82, HR 78.     8/12/2024:  Patient is here for for a follow-up appointment.  She is history of nephrolithiasis

## 2025-02-10 ENCOUNTER — OFFICE VISIT (OUTPATIENT)
Dept: NEPHROLOGY | Age: 45
End: 2025-02-10
Payer: COMMERCIAL

## 2025-02-10 VITALS
WEIGHT: 139 LBS | BODY MASS INDEX: 24.63 KG/M2 | HEIGHT: 63 IN | SYSTOLIC BLOOD PRESSURE: 136 MMHG | HEART RATE: 78 BPM | DIASTOLIC BLOOD PRESSURE: 82 MMHG

## 2025-02-10 DIAGNOSIS — I10 HYPERTENSION, ESSENTIAL: ICD-10-CM

## 2025-02-10 DIAGNOSIS — Z87.440 HISTORY OF RECURRENT UTIS: ICD-10-CM

## 2025-02-10 DIAGNOSIS — N20.0 NEPHROLITHIASIS: Primary | ICD-10-CM

## 2025-02-10 DIAGNOSIS — E11.9 TYPE 2 DIABETES MELLITUS WITHOUT COMPLICATION, WITHOUT LONG-TERM CURRENT USE OF INSULIN (HCC): ICD-10-CM

## 2025-02-10 PROCEDURE — 3079F DIAST BP 80-89 MM HG: CPT | Performed by: INTERNAL MEDICINE

## 2025-02-10 PROCEDURE — 99213 OFFICE O/P EST LOW 20 MIN: CPT | Performed by: INTERNAL MEDICINE

## 2025-02-10 PROCEDURE — 3075F SYST BP GE 130 - 139MM HG: CPT | Performed by: INTERNAL MEDICINE

## 2025-02-18 ENCOUNTER — PATIENT MESSAGE (OUTPATIENT)
Dept: FAMILY MEDICINE CLINIC | Age: 45
End: 2025-02-18

## 2025-03-04 NOTE — PATIENT INSTRUCTIONS
Get CBC today. Follow up as needed. Lab Results   Component Value Date    EGFR 52 03/03/2025    EGFR 63 11/07/2024    EGFR 70 11/06/2024    CREATININE 0.99 03/03/2025    CREATININE 0.89 11/07/2024    CREATININE 0.82 11/06/2024     Cr currently stable at baseline   Monitor with IV diuresis

## 2025-03-12 ENCOUNTER — TELEPHONE (OUTPATIENT)
Dept: FAMILY MEDICINE CLINIC | Age: 45
End: 2025-03-12

## 2025-03-22 ENCOUNTER — HOSPITAL ENCOUNTER (OUTPATIENT)
Age: 45
Discharge: HOME | End: 2025-03-22
Payer: COMMERCIAL

## 2025-03-22 DIAGNOSIS — E78.5 HYPERLIPIDEMIA, UNSPECIFIED HYPERLIPIDEMIA TYPE: ICD-10-CM

## 2025-03-22 DIAGNOSIS — E11.9 TYPE 2 DIABETES MELLITUS WITHOUT COMPLICATION, WITHOUT LONG-TERM CURRENT USE OF INSULIN: ICD-10-CM

## 2025-03-22 LAB
CHOLEST SERPL-MCNC: 229 MG/DL (ref 0–199)
CHOLESTEROL/HDL RATIO: 4.9
EST. AVERAGE GLUCOSE BLD GHB EST-MCNC: 166 MG/DL
HBA1C MFR BLD: 7.4 % (ref 4–6)
HDLC SERPL-MCNC: 47 MG/DL
LDLC SERPL CALC-MCNC: 159 MG/DL (ref 0–100)
TRIGL SERPL-MCNC: 113 MG/DL
VLDLC SERPL CALC-MCNC: 23 MG/DL (ref 1–30)

## 2025-03-22 PROCEDURE — 80061 LIPID PANEL: CPT

## 2025-03-22 PROCEDURE — 83036 HEMOGLOBIN GLYCOSYLATED A1C: CPT

## 2025-03-22 PROCEDURE — 36415 COLL VENOUS BLD VENIPUNCTURE: CPT

## 2025-03-28 ENCOUNTER — OFFICE VISIT (OUTPATIENT)
Dept: FAMILY MEDICINE CLINIC | Age: 45
End: 2025-03-28

## 2025-03-28 VITALS
SYSTOLIC BLOOD PRESSURE: 128 MMHG | DIASTOLIC BLOOD PRESSURE: 82 MMHG | TEMPERATURE: 98.3 F | RESPIRATION RATE: 14 BRPM | HEART RATE: 82 BPM | BODY MASS INDEX: 24.29 KG/M2 | WEIGHT: 137.1 LBS | OXYGEN SATURATION: 98 % | HEIGHT: 63 IN

## 2025-03-28 DIAGNOSIS — B37.31 RECURRENT CANDIDIASIS OF VAGINA: ICD-10-CM

## 2025-03-28 DIAGNOSIS — E78.00 HYPERCHOLESTEREMIA: ICD-10-CM

## 2025-03-28 DIAGNOSIS — H60.93 RECURRENT OTITIS EXTERNA OF BOTH EARS: ICD-10-CM

## 2025-03-28 DIAGNOSIS — J30.1 CHRONIC SEASONAL ALLERGIC RHINITIS DUE TO POLLEN: ICD-10-CM

## 2025-03-28 DIAGNOSIS — E11.9 TYPE 2 DIABETES MELLITUS WITHOUT COMPLICATION, WITHOUT LONG-TERM CURRENT USE OF INSULIN: Primary | ICD-10-CM

## 2025-03-28 DIAGNOSIS — R10.9 ABDOMINAL CRAMPING: ICD-10-CM

## 2025-03-28 RX ORDER — CIPROFLOXACIN AND DEXAMETHASONE 3; 1 MG/ML; MG/ML
4 SUSPENSION/ DROPS AURICULAR (OTIC) 2 TIMES DAILY
Qty: 7.5 ML | Refills: 1 | Status: SHIPPED | OUTPATIENT
Start: 2025-03-28

## 2025-03-28 RX ORDER — FLUCONAZOLE 150 MG/1
TABLET ORAL
Qty: 6 TABLET | Refills: 3 | Status: SHIPPED | OUTPATIENT
Start: 2025-03-28

## 2025-03-28 SDOH — ECONOMIC STABILITY: FOOD INSECURITY: WITHIN THE PAST 12 MONTHS, THE FOOD YOU BOUGHT JUST DIDN'T LAST AND YOU DIDN'T HAVE MONEY TO GET MORE.: NEVER TRUE

## 2025-03-28 SDOH — ECONOMIC STABILITY: FOOD INSECURITY: WITHIN THE PAST 12 MONTHS, YOU WORRIED THAT YOUR FOOD WOULD RUN OUT BEFORE YOU GOT MONEY TO BUY MORE.: NEVER TRUE

## 2025-03-28 ASSESSMENT — PATIENT HEALTH QUESTIONNAIRE - PHQ9
2. FEELING DOWN, DEPRESSED OR HOPELESS: NOT AT ALL
1. LITTLE INTEREST OR PLEASURE IN DOING THINGS: NOT AT ALL
SUM OF ALL RESPONSES TO PHQ QUESTIONS 1-9: 0

## 2025-03-28 NOTE — PROGRESS NOTES
candidiasis of vagina  -     fluconazole (DIFLUCAN) 150 MG tablet; Take 1 tab by mouth once; repeat dose again 3 days., Disp-6 tablet, R-3Normal  5. Chronic seasonal allergic rhinitis due to pollen  6. Abdominal cramping  -     Lactobacillus TABS; Take 1 tablet by mouth daily, Disp-90 tablet, R-3Normal         Plan:      Assessment & Plan  1. Diabetes Mellitus.  - A1c levels have improved, decreasing from 8 to 7.4.  - Current Trulicity dosage of 1.5 mg will be maintained.  - An A1c test is scheduled for 09/2025.  - Advised to continue monitoring carbohydrate intake and incorporate regular exercise.    2. Hyperlipidemia.  - Total cholesterol levels remain stable with no significant changes.  - Triglyceride levels are elevated at 159, exceeding the desired range of <150.  - LDL levels are high at 159, with a target of <100.  - Advised to adopt a diet low in cholesterol and increase physical activity. A cholesterol panel will be conducted in 09/2025.    3. Yeast Infections.  - Experiences monthly yeast infections, managed with Diflucan.  - Refills for Diflucan will be provided.  - Advised to try a probiotic capsule prescribed in 2017 to help manage symptoms. If too harsh on the stomach, consider over-the-counter options for women's health.    4. Ear Issues.  - Reports issues with ears, including redness and burning from previous creams.  - Advised to try a steroid ointment on ears for inflammation. Discontinue use if symptoms worsen.  - Ciprodex drops will be refilled and can be used as needed, following a full course if started.    5. Sinus Congestion.  - Reports sinus pressure and headaches.  - Advised to continue using Flonase nasal spray, one spray in each nostril in the morning, and separate doses if needed.  - Over-the-counter decongestants like Sudafed or DayQuil can be used for sinus pressure and pain. Notify the clinic if symptoms worsen or a full-blown sinus infection develops.    6. Medication Management.  -

## 2025-04-01 ENCOUNTER — PATIENT MESSAGE (OUTPATIENT)
Dept: FAMILY MEDICINE CLINIC | Age: 45
End: 2025-04-01

## 2025-04-01 RX ORDER — FLUTICASONE PROPIONATE 50 MCG
1-2 SPRAY, SUSPENSION (ML) NASAL DAILY
Qty: 48 G | Refills: 3 | Status: SHIPPED | OUTPATIENT
Start: 2025-04-01

## 2025-04-01 NOTE — TELEPHONE ENCOUNTER
Emily called requesting a refill of the below medication which has been pended for you:     Requested Prescriptions     Pending Prescriptions Disp Refills    fluticasone (FLONASE) 50 MCG/ACT nasal spray 48 g 3     Si-2 sprays by Nasal route daily       Last Appointment Date: 3/28/2025  Next Appointment Date: 2025    Allergies   Allergen Reactions    Latex Dermatitis    Amoxil [Amoxicillin] Diarrhea    Bactrim [Sulfamethoxazole-Trimethoprim] Itching    Cantaloupe (Diagnostic) Swelling     Tongue swells    Clindamycin/Lincomycin Nausea And Vomiting    Flagyl [Metronidazole] Other (See Comments)     Sees bugs crawling    Flomax [Tamsulosin Hcl] Nausea Only and Other (See Comments)     Lightheaded, couldn't walk by herself    Biaxin [Clarithromycin] Nausea And Vomiting    Ceftin [Cefuroxime Axetil] Diarrhea and Nausea And Vomiting    Keflex [Cephalexin] Nausea And Vomiting

## 2025-04-04 RX ORDER — L. ACIDOPHILUS/L.BULGARICUS 1MM CELL
1 TABLET ORAL DAILY
Qty: 90 TABLET | Refills: 3 | Status: SHIPPED | OUTPATIENT
Start: 2025-04-04

## 2025-04-08 DIAGNOSIS — B37.31 RECURRENT CANDIDIASIS OF VAGINA: ICD-10-CM

## 2025-04-08 RX ORDER — FLUCONAZOLE 150 MG/1
TABLET ORAL
Qty: 6 TABLET | Refills: 3 | Status: CANCELLED | OUTPATIENT
Start: 2025-04-08

## 2025-04-08 NOTE — TELEPHONE ENCOUNTER
Pt to take once and repeat in 3 days. KB prescribed enough to have for 3 episodes with refills. Pharmacy updated.

## 2025-04-08 NOTE — TELEPHONE ENCOUNTER
[facility] calling about Flucasone. They are needing clarification on instructions. Please send new script.

## 2025-09-04 ENCOUNTER — HOSPITAL ENCOUNTER (OUTPATIENT)
Dept: LAB | Age: 45
Discharge: HOME | End: 2025-09-04
Payer: COMMERCIAL

## 2025-09-04 ENCOUNTER — HOSPITAL ENCOUNTER (OUTPATIENT)
Dept: CT IMAGING | Age: 45
Discharge: HOME | End: 2025-09-06
Payer: COMMERCIAL

## 2025-09-04 ENCOUNTER — OFFICE VISIT (OUTPATIENT)
Dept: PRIMARY CARE CLINIC | Age: 45
End: 2025-09-04
Payer: COMMERCIAL

## 2025-09-04 VITALS
WEIGHT: 140 LBS | HEART RATE: 78 BPM | TEMPERATURE: 98.3 F | SYSTOLIC BLOOD PRESSURE: 112 MMHG | BODY MASS INDEX: 24.8 KG/M2 | OXYGEN SATURATION: 98 % | DIASTOLIC BLOOD PRESSURE: 68 MMHG

## 2025-09-04 DIAGNOSIS — E11.9 TYPE 2 DIABETES MELLITUS WITHOUT COMPLICATION, WITHOUT LONG-TERM CURRENT USE OF INSULIN (HCC): ICD-10-CM

## 2025-09-04 DIAGNOSIS — R30.0 DYSURIA: ICD-10-CM

## 2025-09-04 DIAGNOSIS — R10.9 FLANK PAIN: ICD-10-CM

## 2025-09-04 DIAGNOSIS — R10.9 FLANK PAIN: Primary | ICD-10-CM

## 2025-09-04 LAB
BILIRUB UR QL STRIP: NEGATIVE
CLARITY UR: CLEAR
COLOR UR: YELLOW
COMMENT: ABNORMAL
GLUCOSE UR STRIP-MCNC: ABNORMAL MG/DL
HGB UR QL STRIP.AUTO: NEGATIVE
KETONES UR STRIP-MCNC: NEGATIVE MG/DL
LEUKOCYTE ESTERASE UR QL STRIP: NEGATIVE
NITRITE UR QL STRIP: NEGATIVE
PH UR STRIP: 6 [PH] (ref 5–6)
PROT UR STRIP-MCNC: NEGATIVE MG/DL
SP GR UR STRIP: 1.02 (ref 1.01–1.02)
UROBILINOGEN UR STRIP-ACNC: NORMAL EU/DL (ref 0–1)

## 2025-09-04 PROCEDURE — 3051F HG A1C>EQUAL 7.0%<8.0%: CPT | Performed by: FAMILY MEDICINE

## 2025-09-04 PROCEDURE — 99213 OFFICE O/P EST LOW 20 MIN: CPT | Performed by: FAMILY MEDICINE

## 2025-09-04 PROCEDURE — 74176 CT ABD & PELVIS W/O CONTRAST: CPT

## 2025-09-04 PROCEDURE — 81003 URINALYSIS AUTO W/O SCOPE: CPT

## 2025-09-04 RX ORDER — ONDANSETRON 4 MG/1
4 TABLET, ORALLY DISINTEGRATING ORAL 3 TIMES DAILY PRN
Qty: 21 TABLET | Refills: 0 | Status: SHIPPED | OUTPATIENT
Start: 2025-09-04

## 2025-09-04 RX ORDER — PHENAZOPYRIDINE HYDROCHLORIDE 100 MG/1
100 TABLET, FILM COATED ORAL 3 TIMES DAILY PRN
Qty: 30 TABLET | Refills: 0 | Status: SHIPPED | OUTPATIENT
Start: 2025-09-04 | End: 2026-09-04

## 2025-09-04 RX ORDER — TAMSULOSIN HYDROCHLORIDE 0.4 MG/1
0.4 CAPSULE ORAL DAILY
Qty: 30 CAPSULE | Refills: 0 | Status: SHIPPED | OUTPATIENT
Start: 2025-09-04

## 2025-09-04 ASSESSMENT — PATIENT HEALTH QUESTIONNAIRE - PHQ9
SUM OF ALL RESPONSES TO PHQ QUESTIONS 1-9: 0
SUM OF ALL RESPONSES TO PHQ QUESTIONS 1-9: 0
1. LITTLE INTEREST OR PLEASURE IN DOING THINGS: NOT AT ALL
2. FEELING DOWN, DEPRESSED OR HOPELESS: NOT AT ALL
SUM OF ALL RESPONSES TO PHQ QUESTIONS 1-9: 0
SUM OF ALL RESPONSES TO PHQ QUESTIONS 1-9: 0

## 2025-09-04 ASSESSMENT — ENCOUNTER SYMPTOMS
SHORTNESS OF BREATH: 0
BACK PAIN: 1
VOMITING: 0
COUGH: 0
NAUSEA: 1
ABDOMINAL PAIN: 1

## (undated) DEVICE — VIDEO URETERO-RENOSCOPE FLEX-XC1: Brand: N.A,

## (undated) DEVICE — VCARE LARGE, UTERINE MANIPULATOR, VAGINAL-CERVICAL-AHLUWALIA'S-RETRACTOR-ELEVATOR: Brand: VCARE

## (undated) DEVICE — GLOVE ORANGE PI 7 1/2   MSG9075

## (undated) DEVICE — BITE BLOCK W/VELCRO STRAP

## (undated) DEVICE — 1200CC GUARDIAN II: Brand: GUARDIAN

## (undated) DEVICE — MASTISOL ADHESIVE LIQ 2/3ML

## (undated) DEVICE — TRI-LUMEN FILTERED TUBE SET WITH ACTIVATED CHARCOAL FILTER: Brand: AIRSEAL

## (undated) DEVICE — CANNULA SEAL

## (undated) DEVICE — CYSTO/BLADDER IRRIGATION SET, REGULATING CLAMP

## (undated) DEVICE — BLADELESS OBTURATOR: Brand: WECK VISTA

## (undated) DEVICE — 60 ML SYRINGE REGULAR TIP: Brand: MONOJECT

## (undated) DEVICE — SYSTEM PMP SGL ACT W/ 10CC VAC SYR 1 W VLV FOR ENDOSCP SURG

## (undated) DEVICE — SUTURE VCRL + SZ 4-0 L27IN ABSRB WHT FS-2 3/8 CIR REV CUT VCP422H

## (undated) DEVICE — CANNULA NSL AD L2IN ETCO2 SAMP SFT CRUSH RESIST FEM AIRLFE

## (undated) DEVICE — CONNECTOR TBNG AUX H2O JET DISP FOR OLY 160/180 SER

## (undated) DEVICE — COLUMN DRAPE

## (undated) DEVICE — BRAVO CF CAPSULE  DELIVERY DEV, 5-PK: Brand: BRAVO

## (undated) DEVICE — STRIP,CLOSURE,WOUND,MEDI-STRIP,1/2X4: Brand: MEDLINE

## (undated) DEVICE — BAG DRN UROLOGY ANTI REFLX 200ML

## (undated) DEVICE — MERCY DEFIANCE ENDO KIT: Brand: MEDLINE INDUSTRIES, INC.

## (undated) DEVICE — AIRSEAL 8 MM ACCESS PORT AND LOW PROFILE OBTURATOR WITH BLADELESS OPTICAL TIP, 120 MM LENGTH: Brand: AIRSEAL

## (undated) DEVICE — GLOVE ORANGE PI 8 1/2   MSG9085

## (undated) DEVICE — Z DUP USE 2139215 SHEATH URO 11/13FR L28CM URETRAL NAVIGATOR ACC

## (undated) DEVICE — BASIC SINGLE BASIN BTC-LF: Brand: MEDLINE INDUSTRIES, INC.

## (undated) DEVICE — TIP COVER ACCESSORY

## (undated) DEVICE — FORCEPS BX L240CM JAW DIA2.4MM ORNG L CAP W/ NDL DISP RAD

## (undated) DEVICE — LINE SAMP O2 6.5FT W/FEMALE CONN F/ADULT CAPNOLINE PLUS

## (undated) DEVICE — SUTURE V-LOC 180 SZ 2-0 L9IN ABSRB VLT GS-21 L37MM 1/2 CIR VLOCM0345

## (undated) DEVICE — SUTURE VCRL + SZ 0 L18IN ABSRB TIE VCP106G

## (undated) DEVICE — GOWN,AURORA,NONREINFORCED,LARGE: Brand: MEDLINE

## (undated) DEVICE — BANDAGE ADH W1XL3IN NAT FAB WVN FLX DURABLE N ADH PD SEAL

## (undated) DEVICE — DISCONTINUED USE 419147 KIT ENDOSCOPY CUSTOM PACK

## (undated) DEVICE — PACK PROCEDURE SURG GYN ROBOTIC

## (undated) DEVICE — PACK,CYSTOSCOPY,PK I,AURORA: Brand: MEDLINE

## (undated) DEVICE — POUCH DRNGE FLX BND INTEGR RAIL CLMP DISP EZ CTCH

## (undated) DEVICE — SOLUTION IV IRRIG 1000ML POUR BTL 2F7114

## (undated) DEVICE — GOWN,AURORA,NONRNF,XL,30/CS: Brand: MEDLINE

## (undated) DEVICE — ELECTRO LUBE IS A SINGLE PATIENT USE DEVICE THAT IS INTENDED TO BE USED ON ELECTROSURGICAL ELECTRODES TO REDUCE STICKING.: Brand: KEY SURGICAL ELECTRO LUBE

## (undated) DEVICE — NITINOL STONE RETRIEVAL BASKET: Brand: ZERO TIP

## (undated) DEVICE — 4-PORT MANIFOLD: Brand: NEPTUNE 2

## (undated) DEVICE — SUTURE V-LOC 180 SZ 2-0 L12IN ABSRB VLT GS-21 L37MM 1/2 CIR VLOCM0315

## (undated) DEVICE — Z DISCONTINUED USE 2743520 GUIDEWIRE URO L150CM DIA0.035IN STIFF NIT HYDRPHLC STR TIP

## (undated) DEVICE — SOLUTION IV IRRIG WATER 1000ML POUR BRL 2F7114

## (undated) DEVICE — ARM DRAPE

## (undated) DEVICE — Z DUP USE 2175984 BAND URIN CATH W2IN LEG FIT UPTO 30IN LOK SYS STRTCH MAT 316] DALE MEDICAL PRODUCTS INC]

## (undated) DEVICE — INSUFFLATION NEEDLE TO ESTABLISH PNEUMOPERITONEUM.: Brand: INSUFFLATION NEEDLE